# Patient Record
Sex: FEMALE | Race: WHITE | NOT HISPANIC OR LATINO | Employment: OTHER | URBAN - METROPOLITAN AREA
[De-identification: names, ages, dates, MRNs, and addresses within clinical notes are randomized per-mention and may not be internally consistent; named-entity substitution may affect disease eponyms.]

---

## 2017-01-17 ENCOUNTER — HOSPITAL ENCOUNTER (OUTPATIENT)
Dept: LAB | Facility: MEDICAL CENTER | Age: 82
End: 2017-01-17
Attending: INTERNAL MEDICINE
Payer: MEDICARE

## 2017-01-17 DIAGNOSIS — E78.5 DYSLIPIDEMIA: ICD-10-CM

## 2017-01-17 DIAGNOSIS — E03.9 ACQUIRED HYPOTHYROIDISM: ICD-10-CM

## 2017-01-17 LAB
25(OH)D3 SERPL-MCNC: 43 NG/ML (ref 30–100)
ALBUMIN SERPL BCP-MCNC: 4 G/DL (ref 3.2–4.9)
ALBUMIN/GLOB SERPL: 1.4 G/DL
ALP SERPL-CCNC: 45 U/L (ref 30–99)
ALT SERPL-CCNC: 14 U/L (ref 2–50)
ANION GAP SERPL CALC-SCNC: 5 MMOL/L (ref 0–11.9)
AST SERPL-CCNC: 16 U/L (ref 12–45)
BILIRUB SERPL-MCNC: 0.6 MG/DL (ref 0.1–1.5)
BUN SERPL-MCNC: 22 MG/DL (ref 8–22)
CALCIUM SERPL-MCNC: 9.2 MG/DL (ref 8.5–10.5)
CHLORIDE SERPL-SCNC: 105 MMOL/L (ref 96–112)
CHOLEST SERPL-MCNC: 221 MG/DL (ref 100–199)
CO2 SERPL-SCNC: 26 MMOL/L (ref 20–33)
CREAT SERPL-MCNC: 1.07 MG/DL (ref 0.5–1.4)
GLOBULIN SER CALC-MCNC: 2.9 G/DL (ref 1.9–3.5)
GLUCOSE SERPL-MCNC: 71 MG/DL (ref 65–99)
HDLC SERPL-MCNC: 49 MG/DL
LDLC SERPL CALC-MCNC: 137 MG/DL
POTASSIUM SERPL-SCNC: 3.3 MMOL/L (ref 3.6–5.5)
PROT SERPL-MCNC: 6.9 G/DL (ref 6–8.2)
SODIUM SERPL-SCNC: 136 MMOL/L (ref 135–145)
TRIGL SERPL-MCNC: 175 MG/DL (ref 0–149)
TSH SERPL DL<=0.005 MIU/L-ACNC: 0.89 UIU/ML (ref 0.3–3.7)

## 2017-01-17 PROCEDURE — 80061 LIPID PANEL: CPT

## 2017-01-17 PROCEDURE — 84443 ASSAY THYROID STIM HORMONE: CPT

## 2017-01-17 PROCEDURE — 80053 COMPREHEN METABOLIC PANEL: CPT

## 2017-01-17 PROCEDURE — 82306 VITAMIN D 25 HYDROXY: CPT

## 2017-01-17 PROCEDURE — 36415 COLL VENOUS BLD VENIPUNCTURE: CPT

## 2017-01-19 ENCOUNTER — OFFICE VISIT (OUTPATIENT)
Dept: MEDICAL GROUP | Facility: MEDICAL CENTER | Age: 82
End: 2017-01-19
Payer: MEDICARE

## 2017-01-19 VITALS
WEIGHT: 156 LBS | RESPIRATION RATE: 16 BRPM | TEMPERATURE: 97.5 F | HEIGHT: 64 IN | HEART RATE: 77 BPM | DIASTOLIC BLOOD PRESSURE: 66 MMHG | OXYGEN SATURATION: 93 % | SYSTOLIC BLOOD PRESSURE: 142 MMHG | BODY MASS INDEX: 26.63 KG/M2

## 2017-01-19 DIAGNOSIS — E78.5 DYSLIPIDEMIA: ICD-10-CM

## 2017-01-19 DIAGNOSIS — E03.9 ACQUIRED HYPOTHYROIDISM: ICD-10-CM

## 2017-01-19 DIAGNOSIS — E87.6 HYPOKALEMIA: ICD-10-CM

## 2017-01-19 DIAGNOSIS — I10 ESSENTIAL HYPERTENSION: ICD-10-CM

## 2017-01-19 PROCEDURE — 99214 OFFICE O/P EST MOD 30 MIN: CPT | Performed by: INTERNAL MEDICINE

## 2017-01-19 ASSESSMENT — PATIENT HEALTH QUESTIONNAIRE - PHQ9: CLINICAL INTERPRETATION OF PHQ2 SCORE: 0

## 2017-01-19 NOTE — MR AVS SNAPSHOT
"        Felipa HYATT Rach   2017 3:40 PM   Office Visit   MRN: [id number]    Department:  [address] [place] [address]   Dept Phone:  [phone]    Description:  Female : 1932   Provider:  Kirill Friedman M.D.           Reason for Visit     Follow-Up Doing ok      Allergies as of 2017     Allergen Noted Reactions    Lisinopril 2015   Diarrhea, Nausea, Cough    GI problems, lost control    Losartan 2015   Nausea, Cough    Pcn [Penicillins] 2014         You were diagnosed with     Essential hypertension   [3275268]       Acquired hypothyroidism   [2426844]       Dyslipidemia   [391691]       Hypokalemia   [135690]         Vital Signs     Blood Pressure Pulse Temperature Respirations Height Weight    142/66 mmHg 77 36.4 °C (97.5 °F) 16 1.626 m (5' 4\") 70.761 kg (156 lb)    Body Mass Index Oxygen Saturation Smoking Status             26.76 kg/m2 93% Former Smoker         Basic Information     Date Of Birth Sex Race Ethnicity Preferred Language    1932 Female White Non- English      Your appointments     2017  3:00 PM   Established Patient with Kirill Friedman M.D.   [facility] [address]    [address] [facility] [address]  [facility] [address] [phone]           You will be receiving a confirmation call a few days before your appointment from our automated call confirmation system.              Problem List              ICD-10-CM Priority Class Noted - Resolved    Acquired hypothyroidism E03.9 Medium  2014 - Present    Dyslipidemia E78.5 Medium  2015 - Present    Essential hypertension I10 High  2015 - Present    Vitamin D deficiency E55.9   3/11/2016 - Present      Health Maintenance        Date Due Completion Dates    IMM DTaP/Tdap/Td Vaccine (1 - Tdap) 1951 ---    IMM ZOSTER VACCINE 1992 ---    IMM INFLUENZA (1) 2016 ---    BONE DENSITY 2021            Current Immunizations     13-VALENT PCV PREVNAR 2016   "    Influenza Vaccine Adult HD 9/19/2016    Pneumococcal polysaccharide vaccine (PPSV-23) 12/20/2014  8:58 AM      Below and/or attached are the medications your provider expects you to take. Review all of your home medications and newly ordered medications with your provider and/or pharmacist. Follow medication instructions as directed by your provider and/or pharmacist. Please keep your medication list with you and share with your provider. Update the information when medications are discontinued, doses are changed, or new medications (including over-the-counter products) are added; and carry medication information at all times in the event of emergency situations     Allergies:  LISINOPRIL - Diarrhea,Nausea,Cough     LOSARTAN - Nausea,Cough     PCN - (reactions not documented)               Medications  Valid as of: January 19, 2017 -  4:32 PM    Generic Name Brand Name Tablet Size Instructions for use    AmLODIPine Besylate (Tab) NORVASC 5 MG Take 1 Tab by mouth 2 Times a Day.        Cholecalciferol   Take 1 Cap by mouth every day.        HydrALAZINE HCl (Tab) APRESOLINE 25 MG Take 1 Tab by mouth 2 Times a Day.        Levothyroxine Sodium (Tab) SYNTHROID 100 MCG Take 1 Tab by mouth every morning before breakfast.        .                 Medicines prescribed today were sent to:     [facility]/PHARMACY #[id number] - [place], [place] - [address] [address]    Phone: [phone] Fax: [phone]    Open 24 Hours?: No      Medication refill instructions:       If your prescription bottle indicates you have medication refills left, it is not necessary to call your provider’s office. Please contact your pharmacy and they will refill your medication.    If your prescription bottle indicates you do not have any refills left, you may request refills at any time through one of the following ways: The online [employer] system (except Urgent Care), by calling your provider’s  office, or by asking your pharmacy to contact your provider’s office with a refill request. Medication refills are processed only during regular business hours and may not be available until the next business day. Your provider may request additional information or to have a follow-up visit with you prior to refilling your medication.   *Please Note: Medication refills are assigned a new Rx number when refilled electronically. Your pharmacy may indicate that no refills were authorized even though a new prescription for the same medication is available at the pharmacy. Please request the medicine by name with the pharmacy before contacting your provider for a refill.        Other Notes About Your Plan     No evidence  Of CHF , reviewed            MyChart Access Code: Activation code not generated  Current [employer] Status: Active

## 2017-01-20 NOTE — PROGRESS NOTES
"CC: Follow-up multiple issues.    HPI:   Felipa presents today with the following.    1. Essential hypertension  Presents having been maintained on 3 drug therapy including spironolactone. She reports she did not like the way she felt on the spironolactone. She felt as if she was losing weight and somewhat weakened. She stopped the medication several weeks ago. Reports blood pressures not increased during that time.    2. Acquired hypothyroidism  Maintain on thyroid medication recent check showing acceptable values. Denies any hair skin changes.    3. Dyslipidemia  History of elevated cholesterol slightly elevated on lab work. Never been on a statin denies any significant risk and family for heart disease.    4. Hypokalemia  Potassium was found to be slightly low at 3.3 again she has stopped spironolactone over a month ago. Denies any cramping.      Patient Active Problem List    Diagnosis Date Noted   • Essential hypertension 04/18/2015     Priority: High   • Dyslipidemia 01/06/2015     Priority: Medium   • Acquired hypothyroidism 12/20/2014     Priority: Medium   • Vitamin D deficiency 03/11/2016       Current Outpatient Prescriptions   Medication Sig Dispense Refill   • amlodipine (NORVASC) 5 MG Tab Take 1 Tab by mouth 2 Times a Day. 180 Tab 4   • hydrALAZINE (APRESOLINE) 25 MG Tab Take 1 Tab by mouth 2 Times a Day. 180 Tab 4   • levothyroxine (SYNTHROID) 100 MCG Tab Take 1 Tab by mouth every morning before breakfast. 90 Tab 3   • Cholecalciferol (VITAMIN D-3 PO) Take 1 Cap by mouth every day.       No current facility-administered medications for this visit.         Allergies as of 01/19/2017 - Matthew as Reviewed 01/19/2017   Allergen Reaction Noted   • Lisinopril Diarrhea, Nausea, and Cough 04/08/2015   • Losartan Nausea and Cough 04/08/2015   • Pcn [penicillins]  12/19/2014        ROS: As per HPI.    /66 mmHg  Pulse 77  Temp(Src) 36.4 °C (97.5 °F)  Resp 16  Ht 1.626 m (5' 4\")  Wt 70.761 kg (156 lb) "  BMI 26.76 kg/m2  SpO2 93%    Physical Exam:  Gen:         Alert and oriented, No apparent distress.  Neck:        No Lymphadenopathy or Bruits.  Lungs:     Clear to auscultation bilaterally  CV:          Regular rate and rhythm. No murmurs, rubs or gallops.               Ext:          No clubbing, cyanosis, edema.      Assessment and Plan.   84 y.o. female with the following issues.    1. Essential hypertension  Currently well controlled, Discuss diet, exercise and salt restriction.    2. Acquired hypothyroidism  Hypothyroidism.   Currently adequately replaced, recheck 6 months to one year.      3. Dyslipidemia  Dyslipidemia.  Lipids currently well controlled. Discussed continued diet and exercise recheck 6 months to 1 year.    4. Hypokalemia  Have recommended increasing potassium in diet as well as low-dose over-the-counter replacement.

## 2017-05-26 ENCOUNTER — TELEPHONE (OUTPATIENT)
Dept: MEDICAL GROUP | Facility: MEDICAL CENTER | Age: 82
End: 2017-05-26

## 2017-05-26 NOTE — TELEPHONE ENCOUNTER
Future Appointments       Provider Department Center    6/6/2017 3:00 PM Kirill Friedman M.D. [facility] [address]        ESTABLISHED PATIENT PRE-VISIT PLANNING     Note: Patient will not be contacted if there is no indication to call.     1.  Reviewed note from last office visit with PCP and/or other med group provider: Yes    2.  If any orders were placed at last visit, do we have Results/Consult Notes?        •  Labs - Labs were not ordered at last office visit.       •  Imaging - Imaging was not ordered at last office visit.       •  Referrals - No referrals were ordered at last office visit.    3.  Immunizations were updated in [employer] using WebIZ?: Yes       •  Web Iz Recommendations: HEPATITIS A  TDAP ZOSTAVAX (Shingles)    4.  Patient is due for the following Health Maintenance Topics:   Health Maintenance Due   Topic Date Due   • IMM DTaP/Tdap/Td Vaccine (1 - Tdap) 09/08/1951   • IMM ZOSTER VACCINE  09/08/1992           5.  Patient was not informed to arrive 15 min prior to their scheduled appointment and bring in their medication bottles.

## 2017-06-05 DIAGNOSIS — E03.9 ACQUIRED HYPOTHYROIDISM: ICD-10-CM

## 2017-06-05 DIAGNOSIS — E87.6 HYPOKALEMIA: ICD-10-CM

## 2017-06-06 ENCOUNTER — APPOINTMENT (OUTPATIENT)
Dept: MEDICAL GROUP | Facility: MEDICAL CENTER | Age: 82
End: 2017-06-06
Payer: MEDICARE

## 2017-06-09 ENCOUNTER — TELEPHONE (OUTPATIENT)
Dept: MEDICAL GROUP | Facility: MEDICAL CENTER | Age: 82
End: 2017-06-09

## 2017-06-09 NOTE — TELEPHONE ENCOUNTER
Future Appointments       Provider Department Center    6/13/2017 9:15 AM [facility]     6/16/2017 2:40 PM Kirill Friedman M.D. [facility] [address]        ESTABLISHED PATIENT PRE-VISIT PLANNING     Note: Patient will not be contacted if there is no indication to call.     1.  Reviewed note from last office visit with PCP and/or other med group provider: Yes    2.  If any orders were placed at last visit, do we have Results/Consult Notes?        •  Labs - patient has appt to have labs done       •  Imaging - Imaging was not ordered at last office visit.       •  Referrals - No referrals were ordered at last office visit.    3.  Immunizations were updated in Epic using WebIZ?: Yes       •  Web Iz Recommendations: HEPATITIS A  TDAP ZOSTAVAX (Shingles)    4.  Patient is due for the following Health Maintenance Topics:   Health Maintenance Due   Topic Date Due   • IMM DTaP/Tdap/Td Vaccine (1 - Tdap) 09/08/1951   • IMM ZOSTER VACCINE  09/08/1992           5.  Patient was not informed to arrive 15 min prior to their scheduled appointment and bring in their medication bottles.

## 2017-06-13 ENCOUNTER — HOSPITAL ENCOUNTER (OUTPATIENT)
Dept: LAB | Facility: MEDICAL CENTER | Age: 82
End: 2017-06-13
Attending: INTERNAL MEDICINE
Payer: MEDICARE

## 2017-06-13 DIAGNOSIS — E03.9 ACQUIRED HYPOTHYROIDISM: ICD-10-CM

## 2017-06-13 DIAGNOSIS — E87.6 HYPOKALEMIA: ICD-10-CM

## 2017-06-13 LAB
ANION GAP SERPL CALC-SCNC: 7 MMOL/L (ref 0–11.9)
BUN SERPL-MCNC: 20 MG/DL (ref 8–22)
CALCIUM SERPL-MCNC: 9.5 MG/DL (ref 8.5–10.5)
CHLORIDE SERPL-SCNC: 106 MMOL/L (ref 96–112)
CO2 SERPL-SCNC: 25 MMOL/L (ref 20–33)
CREAT SERPL-MCNC: 1.12 MG/DL (ref 0.5–1.4)
GFR SERPL CREATININE-BSD FRML MDRD: 46 ML/MIN/1.73 M 2
GLUCOSE SERPL-MCNC: 84 MG/DL (ref 65–99)
POTASSIUM SERPL-SCNC: 4.1 MMOL/L (ref 3.6–5.5)
SODIUM SERPL-SCNC: 138 MMOL/L (ref 135–145)
TSH SERPL DL<=0.005 MIU/L-ACNC: 1.34 UIU/ML (ref 0.3–3.7)

## 2017-06-13 PROCEDURE — 80048 BASIC METABOLIC PNL TOTAL CA: CPT

## 2017-06-13 PROCEDURE — 36415 COLL VENOUS BLD VENIPUNCTURE: CPT

## 2017-06-13 PROCEDURE — 84443 ASSAY THYROID STIM HORMONE: CPT

## 2017-06-16 ENCOUNTER — APPOINTMENT (OUTPATIENT)
Dept: MEDICAL GROUP | Facility: MEDICAL CENTER | Age: 82
End: 2017-06-16
Payer: MEDICARE

## 2017-07-06 ENCOUNTER — OFFICE VISIT (OUTPATIENT)
Dept: MEDICAL GROUP | Facility: MEDICAL CENTER | Age: 82
End: 2017-07-06
Payer: MEDICARE

## 2017-07-06 VITALS
DIASTOLIC BLOOD PRESSURE: 82 MMHG | OXYGEN SATURATION: 96 % | SYSTOLIC BLOOD PRESSURE: 134 MMHG | BODY MASS INDEX: 26.63 KG/M2 | TEMPERATURE: 98.3 F | RESPIRATION RATE: 16 BRPM | HEIGHT: 64 IN | WEIGHT: 156 LBS | HEART RATE: 64 BPM

## 2017-07-06 DIAGNOSIS — I10 ESSENTIAL HYPERTENSION: ICD-10-CM

## 2017-07-06 DIAGNOSIS — N32.81 OAB (OVERACTIVE BLADDER): ICD-10-CM

## 2017-07-06 DIAGNOSIS — R42 VERTIGO: ICD-10-CM

## 2017-07-06 DIAGNOSIS — E03.9 ACQUIRED HYPOTHYROIDISM: ICD-10-CM

## 2017-07-06 PROCEDURE — 99214 OFFICE O/P EST MOD 30 MIN: CPT | Performed by: INTERNAL MEDICINE

## 2017-07-06 RX ORDER — OXYBUTYNIN CHLORIDE 5 MG/1
5 TABLET ORAL 2 TIMES DAILY
Qty: 60 TAB | Refills: 6 | Status: SHIPPED | OUTPATIENT
Start: 2017-07-06 | End: 2017-07-27

## 2017-07-06 NOTE — PROGRESS NOTES
CC: Follow-up multiple issues    HPI:   Felipa presents today with the following.    1. OAB (overactive bladder)  Presents complaining of persistent fatigue when questioned further she is not sleeping well at night because of waking up frequently to urinate. She does not have as much an issue during the day. She does not drink large amount of water before bedtime. She does report some accidents at night if she can't bathroom in time. She does seem to go more at night than during the day. No fevers or chills and no pain with urination.    2. Vertigo  She does have intermittent issues with vertigo as well not bad enough that she wants to physical therapy for.    3. Acquired hypothyroidism  Thyroid checked in a reasonable range maintain on medications denying any related symptoms other than possibly fatigue which is not explained by normal result.    4. Essential hypertension  Has recently stopped spironolactone blood pressure good control denying any chest pain or shortness of breath no edema.      Patient Active Problem List    Diagnosis Date Noted   • Essential hypertension 04/18/2015     Priority: High   • Dyslipidemia 01/06/2015     Priority: Medium   • Acquired hypothyroidism 12/20/2014     Priority: Medium   • Vitamin D deficiency 03/11/2016       Current Outpatient Prescriptions   Medication Sig Dispense Refill   • oxybutynin (DITROPAN) 5 MG Tab Take 1 Tab by mouth 2 Times a Day. 60 Tab 6   • amlodipine (NORVASC) 5 MG Tab Take 1 Tab by mouth 2 Times a Day. 180 Tab 4   • hydrALAZINE (APRESOLINE) 25 MG Tab Take 1 Tab by mouth 2 Times a Day. 180 Tab 4   • levothyroxine (SYNTHROID) 100 MCG Tab Take 1 Tab by mouth every morning before breakfast. 90 Tab 3   • Cholecalciferol (VITAMIN D-3 PO) Take 1 Cap by mouth every day.       No current facility-administered medications for this visit.         Allergies as of 07/06/2017 - Matthew as Reviewed 07/06/2017   Allergen Reaction Noted   • Lisinopril Diarrhea, Nausea, and  "Cough 04/08/2015   • Losartan Nausea and Cough 04/08/2015   • Pcn [penicillins]  12/19/2014        ROS: As per HPI.    /82 mmHg  Pulse 64  Temp(Src) 36.8 °C (98.3 °F)  Resp 16  Ht 1.626 m (5' 4.02\")  Wt 70.761 kg (156 lb)  BMI 26.76 kg/m2  SpO2 96%    Physical Exam:  Gen:         Alert and oriented, No apparent distress.  Neck:        No Lymphadenopathy or Bruits.  Lungs:     Clear to auscultation bilaterally  CV:          Regular rate and rhythm. No murmurs, rubs or gallops.               Ext:          No clubbing, cyanosis, edema.      Assessment and Plan.   84 y.o. female with the following issues.    1. OAB (overactive bladder)  Certainly could be consistent with overactive bladder at night have started on dispensed cautioned about side effects. Will see back in one month to see if it's improved.  - oxybutynin (DITROPAN) 5 MG Tab; Take 1 Tab by mouth 2 Times a Day.  Dispense: 60 Tab; Refill: 6    2. Vertigo  Continue to monitor for worsening symptoms will refer to PT.    3. Acquired hypothyroidism  Currently adequately replaced, recheck 6 months to one year.    4. Essential hypertension  Currently well controlled, Discuss diet, exercise and salt restriction.        "

## 2017-07-06 NOTE — MR AVS SNAPSHOT
"        Felipa HYATT Rach   2017 3:20 PM   Office Visit   MRN: [id number]    Department:  [address] [place] [address]   Dept Phone:  [phone]    Description:  Female : 1932   Provider:  Kirill Friedman M.D.           Reason for Visit     Hypothyroidism     Hypertension     Results review lab results    Fatigue           Allergies as of 2017     Allergen Noted Reactions    Lisinopril 2015   Diarrhea, Nausea, Cough    GI problems, lost control    Losartan 2015   Nausea, Cough    Pcn [Penicillins] 2014         You were diagnosed with     OAB (overactive bladder)   [074152]       Vertigo   [415781]       Acquired hypothyroidism   [3418878]       Essential hypertension   [4201731]         Vital Signs     Blood Pressure Pulse Temperature Respirations Height Weight    134/82 mmHg 64 36.8 °C (98.3 °F) 16 1.626 m (5' 4.02\") 70.761 kg (156 lb)    Body Mass Index Oxygen Saturation Smoking Status             26.76 kg/m2 96% Former Smoker         Basic Information     Date Of Birth Sex Race Ethnicity Preferred Language    1932 Female White Non- English      Your appointments     Sep 14, 2017  2:40 PM   ANNUAL WELLNESS with Kirill Friedman M.D., [facility]    [facility] [address]    [address] [place] [id number]   [phone]              Problem List              ICD-10-CM Priority Class Noted - Resolved    Acquired hypothyroidism E03.9 Medium  2014 - Present    Dyslipidemia E78.5 Medium  2015 - Present    Essential hypertension I10 High  2015 - Present    Vitamin D deficiency E55.9   3/11/2016 - Present      Health Maintenance        Date Due Completion Dates    IMM DTaP/Tdap/Td Vaccine (1 - Tdap) 1951 ---    IMM ZOSTER VACCINE 1992 ---    IMM INFLUENZA (1) 2017    BONE DENSITY 2021            Current Immunizations     13-VALENT PCV PREVNAR 2016    Influenza Vaccine Adult HD 2016  "    Pneumococcal polysaccharide vaccine (PPSV-23) 12/20/2014  8:58 AM      Below and/or attached are the medications your provider expects you to take. Review all of your home medications and newly ordered medications with your provider and/or pharmacist. Follow medication instructions as directed by your provider and/or pharmacist. Please keep your medication list with you and share with your provider. Update the information when medications are discontinued, doses are changed, or new medications (including over-the-counter products) are added; and carry medication information at all times in the event of emergency situations     Allergies:  LISINOPRIL - Diarrhea,Nausea,Cough     LOSARTAN - Nausea,Cough     PCN - (reactions not documented)               Medications  Valid as of: July 06, 2017 -  4:01 PM    Generic Name Brand Name Tablet Size Instructions for use    AmLODIPine Besylate (Tab) NORVASC 5 MG Take 1 Tab by mouth 2 Times a Day.        Cholecalciferol   Take 1 Cap by mouth every day.        HydrALAZINE HCl (Tab) APRESOLINE 25 MG Take 1 Tab by mouth 2 Times a Day.        Levothyroxine Sodium (Tab) SYNTHROID 100 MCG Take 1 Tab by mouth every morning before breakfast.        Oxybutynin Chloride (Tab) DITROPAN 5 MG Take 1 Tab by mouth 2 Times a Day.        .                 Medicines prescribed today were sent to:     [facility]/PHARMACY #[id number] - [place], [place] - [address] [place] [address]    Phone: [phone] Fax: [phone]    Open 24 Hours?: No      Medication refill instructions:       If your prescription bottle indicates you have medication refills left, it is not necessary to call your provider’s office. Please contact your pharmacy and they will refill your medication.    If your prescription bottle indicates you do not have any refills left, you may request refills at any time through one of the following ways: The online [employer] system (except Urgent  Care), by calling your provider’s office, or by asking your pharmacy to contact your provider’s office with a refill request. Medication refills are processed only during regular business hours and may not be available until the next business day. Your provider may request additional information or to have a follow-up visit with you prior to refilling your medication.   *Please Note: Medication refills are assigned a new Rx number when refilled electronically. Your pharmacy may indicate that no refills were authorized even though a new prescription for the same medication is available at the pharmacy. Please request the medicine by name with the pharmacy before contacting your provider for a refill.        Other Notes About Your Plan     No evidence  Of CHF , reviewed            MyChart Access Code: Activation code not generated  Current [employer] Status: Active

## 2017-07-27 ENCOUNTER — RESOLUTE PROFESSIONAL BILLING HOSPITAL PROF FEE (OUTPATIENT)
Dept: HOSPITALIST | Facility: MEDICAL CENTER | Age: 82
End: 2017-07-27
Payer: MEDICARE

## 2017-07-27 ENCOUNTER — HOSPITAL ENCOUNTER (OUTPATIENT)
Facility: MEDICAL CENTER | Age: 82
End: 2017-07-28
Attending: EMERGENCY MEDICINE | Admitting: HOSPITALIST
Payer: MEDICARE

## 2017-07-27 DIAGNOSIS — R11.2 INTRACTABLE VOMITING WITH NAUSEA, UNSPECIFIED VOMITING TYPE: ICD-10-CM

## 2017-07-27 DIAGNOSIS — M79.605 PAIN IN LEFT LEG: ICD-10-CM

## 2017-07-27 PROBLEM — M79.606 LEG PAIN, ANTERIOR: Status: ACTIVE | Noted: 2017-07-27

## 2017-07-27 LAB
ALBUMIN SERPL BCP-MCNC: 4.1 G/DL (ref 3.2–4.9)
ALBUMIN/GLOB SERPL: 1.4 G/DL
ALP SERPL-CCNC: 48 U/L (ref 30–99)
ALT SERPL-CCNC: 12 U/L (ref 2–50)
ANION GAP SERPL CALC-SCNC: 10 MMOL/L (ref 0–11.9)
APPEARANCE UR: CLEAR
AST SERPL-CCNC: 16 U/L (ref 12–45)
BACTERIA #/AREA URNS HPF: ABNORMAL /HPF
BASOPHILS # BLD AUTO: 0.1 % (ref 0–1.8)
BASOPHILS # BLD: 0.01 K/UL (ref 0–0.12)
BILIRUB SERPL-MCNC: 0.6 MG/DL (ref 0.1–1.5)
BILIRUB UR QL STRIP.AUTO: NEGATIVE
BUN SERPL-MCNC: 23 MG/DL (ref 8–22)
CALCIUM SERPL-MCNC: 9.4 MG/DL (ref 8.5–10.5)
CHLORIDE SERPL-SCNC: 106 MMOL/L (ref 96–112)
CO2 SERPL-SCNC: 19 MMOL/L (ref 20–33)
COLOR UR: YELLOW
CREAT SERPL-MCNC: 1.08 MG/DL (ref 0.5–1.4)
CULTURE IF INDICATED INDCX: YES UA CULTURE
EOSINOPHIL # BLD AUTO: 0 K/UL (ref 0–0.51)
EOSINOPHIL NFR BLD: 0 % (ref 0–6.9)
EPI CELLS #/AREA URNS HPF: ABNORMAL /HPF
ERYTHROCYTE [DISTWIDTH] IN BLOOD BY AUTOMATED COUNT: 41.3 FL (ref 35.9–50)
GFR SERPL CREATININE-BSD FRML MDRD: 48 ML/MIN/1.73 M 2
GLOBULIN SER CALC-MCNC: 3 G/DL (ref 1.9–3.5)
GLUCOSE SERPL-MCNC: 196 MG/DL (ref 65–99)
GLUCOSE UR STRIP.AUTO-MCNC: 100 MG/DL
HCT VFR BLD AUTO: 38 % (ref 37–47)
HGB BLD-MCNC: 12.8 G/DL (ref 12–16)
IMM GRANULOCYTES # BLD AUTO: 0.09 K/UL (ref 0–0.11)
IMM GRANULOCYTES NFR BLD AUTO: 0.8 % (ref 0–0.9)
KETONES UR STRIP.AUTO-MCNC: 15 MG/DL
LEUKOCYTE ESTERASE UR QL STRIP.AUTO: ABNORMAL
LIPASE SERPL-CCNC: 7 U/L (ref 11–82)
LYMPHOCYTES # BLD AUTO: 0.47 K/UL (ref 1–4.8)
LYMPHOCYTES NFR BLD: 4.2 % (ref 22–41)
MCH RBC QN AUTO: 30.4 PG (ref 27–33)
MCHC RBC AUTO-ENTMCNC: 33.7 G/DL (ref 33.6–35)
MCV RBC AUTO: 90.3 FL (ref 81.4–97.8)
MICRO URNS: ABNORMAL
MONOCYTES # BLD AUTO: 0.08 K/UL (ref 0–0.85)
MONOCYTES NFR BLD AUTO: 0.7 % (ref 0–13.4)
MUCOUS THREADS #/AREA URNS HPF: ABNORMAL /HPF
NEUTROPHILS # BLD AUTO: 10.43 K/UL (ref 2–7.15)
NEUTROPHILS NFR BLD: 94.2 % (ref 44–72)
NITRITE UR QL STRIP.AUTO: NEGATIVE
NRBC # BLD AUTO: 0 K/UL
NRBC BLD AUTO-RTO: 0 /100 WBC
PH UR STRIP.AUTO: 6 [PH]
PLATELET # BLD AUTO: 201 K/UL (ref 164–446)
PMV BLD AUTO: 8.9 FL (ref 9–12.9)
POTASSIUM SERPL-SCNC: 3.6 MMOL/L (ref 3.6–5.5)
PROT SERPL-MCNC: 7.1 G/DL (ref 6–8.2)
PROT UR QL STRIP: 100 MG/DL
RBC # BLD AUTO: 4.21 M/UL (ref 4.2–5.4)
RBC # URNS HPF: ABNORMAL /HPF
RBC UR QL AUTO: NEGATIVE
SODIUM SERPL-SCNC: 135 MMOL/L (ref 135–145)
SP GR UR STRIP.AUTO: 1.02
TROPONIN I SERPL-MCNC: <0.01 NG/ML (ref 0–0.04)
UROBILINOGEN UR STRIP.AUTO-MCNC: 0.2 MG/DL
WBC # BLD AUTO: 11.1 K/UL (ref 4.8–10.8)
WBC #/AREA URNS HPF: ABNORMAL /HPF

## 2017-07-27 PROCEDURE — 96361 HYDRATE IV INFUSION ADD-ON: CPT

## 2017-07-27 PROCEDURE — 80053 COMPREHEN METABOLIC PANEL: CPT

## 2017-07-27 PROCEDURE — 700105 HCHG RX REV CODE 258: Performed by: EMERGENCY MEDICINE

## 2017-07-27 PROCEDURE — 96374 THER/PROPH/DIAG INJ IV PUSH: CPT

## 2017-07-27 PROCEDURE — 83690 ASSAY OF LIPASE: CPT

## 2017-07-27 PROCEDURE — 81001 URINALYSIS AUTO W/SCOPE: CPT

## 2017-07-27 PROCEDURE — G0378 HOSPITAL OBSERVATION PER HR: HCPCS

## 2017-07-27 PROCEDURE — 87086 URINE CULTURE/COLONY COUNT: CPT

## 2017-07-27 PROCEDURE — 700111 HCHG RX REV CODE 636 W/ 250 OVERRIDE (IP): Performed by: EMERGENCY MEDICINE

## 2017-07-27 PROCEDURE — 700102 HCHG RX REV CODE 250 W/ 637 OVERRIDE(OP): Performed by: HOSPITALIST

## 2017-07-27 PROCEDURE — 84484 ASSAY OF TROPONIN QUANT: CPT

## 2017-07-27 PROCEDURE — A9270 NON-COVERED ITEM OR SERVICE: HCPCS | Performed by: HOSPITALIST

## 2017-07-27 PROCEDURE — 85025 COMPLETE CBC W/AUTO DIFF WBC: CPT

## 2017-07-27 PROCEDURE — 96375 TX/PRO/DX INJ NEW DRUG ADDON: CPT

## 2017-07-27 PROCEDURE — 87077 CULTURE AEROBIC IDENTIFY: CPT

## 2017-07-27 PROCEDURE — 99285 EMERGENCY DEPT VISIT HI MDM: CPT

## 2017-07-27 PROCEDURE — 99219 PR INITIAL OBSERVATION CARE,LEVL II: CPT | Performed by: HOSPITALIST

## 2017-07-27 RX ORDER — MORPHINE SULFATE 4 MG/ML
2 INJECTION, SOLUTION INTRAMUSCULAR; INTRAVENOUS
Status: DISCONTINUED | OUTPATIENT
Start: 2017-07-27 | End: 2017-07-28 | Stop reason: HOSPADM

## 2017-07-27 RX ORDER — METOCLOPRAMIDE HYDROCHLORIDE 5 MG/ML
5 INJECTION INTRAMUSCULAR; INTRAVENOUS ONCE
Status: COMPLETED | OUTPATIENT
Start: 2017-07-27 | End: 2017-07-27

## 2017-07-27 RX ORDER — ONDANSETRON 2 MG/ML
4 INJECTION INTRAMUSCULAR; INTRAVENOUS EVERY 4 HOURS PRN
Status: DISCONTINUED | OUTPATIENT
Start: 2017-07-27 | End: 2017-07-28 | Stop reason: HOSPADM

## 2017-07-27 RX ORDER — LEVOTHYROXINE SODIUM 0.1 MG/1
100 TABLET ORAL
Status: DISCONTINUED | OUTPATIENT
Start: 2017-07-28 | End: 2017-07-28 | Stop reason: HOSPADM

## 2017-07-27 RX ORDER — HYDRALAZINE HYDROCHLORIDE 25 MG/1
25 TABLET, FILM COATED ORAL 2 TIMES DAILY
Status: DISCONTINUED | OUTPATIENT
Start: 2017-07-27 | End: 2017-07-28 | Stop reason: HOSPADM

## 2017-07-27 RX ORDER — MORPHINE SULFATE 4 MG/ML
2 INJECTION, SOLUTION INTRAMUSCULAR; INTRAVENOUS ONCE
Status: COMPLETED | OUTPATIENT
Start: 2017-07-27 | End: 2017-07-27

## 2017-07-27 RX ORDER — BISACODYL 10 MG
10 SUPPOSITORY, RECTAL RECTAL
Status: DISCONTINUED | OUTPATIENT
Start: 2017-07-27 | End: 2017-07-28 | Stop reason: HOSPADM

## 2017-07-27 RX ORDER — ONDANSETRON 4 MG/1
4 TABLET, ORALLY DISINTEGRATING ORAL EVERY 4 HOURS PRN
Status: DISCONTINUED | OUTPATIENT
Start: 2017-07-27 | End: 2017-07-28 | Stop reason: HOSPADM

## 2017-07-27 RX ORDER — SODIUM CHLORIDE 9 MG/ML
500 INJECTION, SOLUTION INTRAVENOUS ONCE
Status: COMPLETED | OUTPATIENT
Start: 2017-07-27 | End: 2017-07-27

## 2017-07-27 RX ORDER — AMLODIPINE BESYLATE 5 MG/1
5 TABLET ORAL 2 TIMES DAILY
Status: DISCONTINUED | OUTPATIENT
Start: 2017-07-27 | End: 2017-07-28 | Stop reason: HOSPADM

## 2017-07-27 RX ORDER — ZOLPIDEM TARTRATE 5 MG/1
2.5 TABLET ORAL NIGHTLY PRN
Status: DISCONTINUED | OUTPATIENT
Start: 2017-07-27 | End: 2017-07-28 | Stop reason: HOSPADM

## 2017-07-27 RX ORDER — AMOXICILLIN 250 MG
2 CAPSULE ORAL 2 TIMES DAILY
Status: DISCONTINUED | OUTPATIENT
Start: 2017-07-27 | End: 2017-07-28 | Stop reason: HOSPADM

## 2017-07-27 RX ORDER — ONDANSETRON 2 MG/ML
4 INJECTION INTRAMUSCULAR; INTRAVENOUS ONCE
Status: COMPLETED | OUTPATIENT
Start: 2017-07-27 | End: 2017-07-27

## 2017-07-27 RX ORDER — ACETAMINOPHEN 325 MG/1
650 TABLET ORAL EVERY 6 HOURS PRN
Status: DISCONTINUED | OUTPATIENT
Start: 2017-07-27 | End: 2017-07-28 | Stop reason: HOSPADM

## 2017-07-27 RX ORDER — POLYETHYLENE GLYCOL 3350 17 G/17G
1 POWDER, FOR SOLUTION ORAL
Status: DISCONTINUED | OUTPATIENT
Start: 2017-07-27 | End: 2017-07-28 | Stop reason: HOSPADM

## 2017-07-27 RX ORDER — TRAMADOL HYDROCHLORIDE 50 MG/1
50 TABLET ORAL EVERY 6 HOURS PRN
Status: DISCONTINUED | OUTPATIENT
Start: 2017-07-27 | End: 2017-07-28 | Stop reason: HOSPADM

## 2017-07-27 RX ADMIN — METOCLOPRAMIDE 5 MG: 5 INJECTION, SOLUTION INTRAMUSCULAR; INTRAVENOUS at 21:05

## 2017-07-27 RX ADMIN — MORPHINE SULFATE 2 MG: 4 INJECTION INTRAVENOUS at 19:32

## 2017-07-27 RX ADMIN — AMLODIPINE BESYLATE 5 MG: 5 TABLET ORAL at 23:50

## 2017-07-27 RX ADMIN — ONDANSETRON 4 MG: 2 INJECTION INTRAMUSCULAR; INTRAVENOUS at 18:51

## 2017-07-27 RX ADMIN — SODIUM CHLORIDE 500 ML: 9 INJECTION, SOLUTION INTRAVENOUS at 21:05

## 2017-07-27 ASSESSMENT — PAIN SCALES - GENERAL: PAINLEVEL_OUTOF10: 9

## 2017-07-27 NOTE — IP AVS SNAPSHOT
" Home Care Instructions                                                                                                                  Name:Felipa Loyd  Medical Record Number:[id number]  CSN: [id number]    YOB: 1932   Age: 84 y.o.  Sex: female  HT:1.626 m (5' 4.02\") WT: 69.6 kg (153 lb 7 oz)          Admit Date: 7/27/2017     Discharge Date:   Today's Date: 7/28/2017  Attending Doctor:  Jerrod Dye M.D.                  Allergies:  Lisinopril; Losartan; and Pcn            Discharge Instructions       D/c iv  [facility] clinic for f/u  PCP within a week  ED if emergency      Discharge Instructions    Discharged to home by car with relative. Discharged via wheelchair, hospital escort: Yes.  Special equipment needed: Not Applicable    Be sure to schedule a follow-up appointment with your primary care doctor or any specialists as instructed.     Discharge Plan:   Influenza Vaccine Indication: Patient Refuses    I understand that a diet low in cholesterol, fat, and sodium is recommended for good health. Unless I have been given specific instructions below for another diet, I accept this instruction as my diet prescription.   Other diet: Heart healthy     Special Instructions: None    · Is patient discharged on Warfarin / Coumadin?   No     · Is patient Post Blood Transfusion?  No    Depression / Suicide Risk    As you are discharged from this [facility] facility, it is important to learn how to keep safe from harming yourself.    Recognize the warning signs:  · Abrupt changes in personality, positive or negative- including increase in energy   · Giving away possessions  · Change in eating patterns- significant weight changes-  positive or negative  · Change in sleeping patterns- unable to sleep or sleeping all the time   · Unwillingness or inability to communicate  · Depression  · Unusual sadness, discouragement and loneliness  · Talk of wanting to die  · Neglect of personal " appearance   · Rebelliousness- reckless behavior  · Withdrawal from people/activities they love  · Confusion- inability to concentrate     If you or a loved one observes any of these behaviors or has concerns about self-harm, here's what you can do:  · Talk about it- your feelings and reasons for harming yourself  · Remove any means that you might use to hurt yourself (examples: pills, rope, extension cords, firearm)  · Get professional help from the community (Mental Health, Substance Abuse, psychological counseling)  · Do not be alone:Call your Safe Contact- someone whom you trust who will be there for you.  · Call your local CRISIS HOTLINE 724-7973 or 305-212-7357  · Call your local Children's Mobile Crisis Response Team [place] (730) 593-9170 or www.Lysanda  · Call the toll free National Suicide Prevention Hotlines   · National Suicide Prevention Lifeline 093-212-PPZT (5533)  · [employer] Line Network 800-SUICIDE (333-7913)        Your appointments     Aug 04, 2017  1:00 PM   Established Patient with Kirill Friedman M.D.   [facility] [address]    [address] [facility] [address]  [place] [id number] [phone]           You will be receiving a confirmation call a few days before your appointment from our automated call confirmation system.            Sep 14, 2017  2:40 PM   ANNUAL WELLNESS with Kirill Friedman M.D., [facility]    [facility] [address]    [address] [facility] [address]  [place] [id number] [phone]                 Discharge Medication Instructions:    Below are the medications your physician expects you to take upon discharge:    Review all your home medications and newly ordered medications with your doctor and/or pharmacist. Follow medication instructions as directed by your doctor and/or pharmacist.    Please keep your medication list with you and share with your physician.               Medication List      ASK your doctor  about these medications        Instructions    Morning Afternoon Evening Bedtime    amlodipine 5 MG Tabs   Last time this was given:  5 mg on 7/28/2017  9:12 AM   Commonly known as:  NORVASC        Take 1 Tab by mouth 2 Times a Day.   Dose:  5 mg                        hydrALAZINE 25 MG Tabs   Last time this was given:  25 mg on 7/28/2017  9:12 AM   Commonly known as:  APRESOLINE        Take 1 Tab by mouth 2 Times a Day.   Dose:  25 mg                        levothyroxine 100 MCG Tabs   Last time this was given:  100 mcg on 7/28/2017  7:00 AM   Commonly known as:  SYNTHROID        Take 1 Tab by mouth every morning before breakfast.   Dose:  100 mcg                        VITAMIN D-3 PO        Take 1 Cap by mouth every evening.   Dose:  1 Cap                                Instructions           Diet / Nutrition:    Follow any diet instructions given to you by your doctor or the dietician, including how much salt (sodium) you are allowed each day.    If you are overweight, talk to your doctor about a weight reduction plan.    Activity:    Remain physically active following your doctor's instructions about exercise and activity.    Rest often.     Any time you become even a little tired or short of breath, SIT DOWN and rest.    Worsening Symptoms:    Report any of the following signs and symptoms to the doctor's office immediately:    *Pain of jaw, arm, or neck  *Chest pain not relieved by medication                               *Dizziness or loss of consciousness  *Difficulty breathing even when at rest   *More tired than usual                                       *Bleeding drainage or swelling of surgical site  *Swelling of feet, ankles, legs or stomach                 *Fever (>100ºF)  *Pink or blood tinged sputum  *Weight gain (3lbs/day or 5lbs /week)           *Shock from internal defibrillator (if applicable)  *Palpitations or irregular heartbeats                *Cool and/or numb extremities    Stroke  Awareness    Common Risk Factors for Stroke include:    Age  Atrial Fibrillation  Carotid Artery Stenosis  Diabetes Mellitus  Excessive alcohol consumption  High blood pressure  Overweight   Physical inactivity  Smoking    Warning signs and symptoms of a stroke include:    *Sudden numbness or weakness of the face, arm or leg (especially on one side of the body).  *Sudden confusion, trouble speaking or understanding.  *Sudden trouble seeing in one or both eyes.  *Sudden trouble walking, dizziness, loss of balance or coordination.Sudden severe headache with no known cause.    It is very important to get treatment quickly when a stroke occurs. If you experience any of the above warning signs, call 911 immediately.                   Disclaimer         Quit Smoking / Tobacco Use:    I understand the use of any tobacco products increases my chance of suffering from future heart disease or stroke and could cause other illnesses which may shorten my life. Quitting the use of tobacco products is the single most important thing I can do to improve my health. For further information on smoking / tobacco cessation call a Toll Free Quit Line at 1-598.370.2326 (*National [employer] [place]) or 1-929.879.2679 (American Lung Association) or you can access the web based program at www.lungusa.org.    [place] Tobacco Users Help Line:  (572) 832-7092       Toll Free: 1-704.446.4135  Quit Tobacco Program [facility] (298)907-5429    Crisis Hotline:    [place] Crisis Hotline:  3-090-XTKKHVQ or 1-161.655.8479    [place] Crisis Hotline:    1-352.965.8604 or 001-650-8336    Discharge Survey:   Thank you for choosing [facility]. We hope we did everything we could to make your hospital stay a pleasant one. You may be receiving a phone survey and we would appreciate your time and participation in answering the questions. Your input is very valuable to us in our efforts to improve our service to our patients and their  families.        My signature on this form indicates that:    1. I have reviewed and understand the above information.  2. My questions regarding this information have been answered to my satisfaction.  3. I have formulated a plan with my discharge nurse to obtain my prescribed medications for home.                  Disclaimer         __________________________________                     __________       ________                       Patient Signature                                                 Date                    Time

## 2017-07-28 ENCOUNTER — PATIENT OUTREACH (OUTPATIENT)
Dept: HEALTH INFORMATION MANAGEMENT | Facility: OTHER | Age: 82
End: 2017-07-28

## 2017-07-28 VITALS
WEIGHT: 153.44 LBS | HEIGHT: 64 IN | TEMPERATURE: 98.9 F | HEART RATE: 80 BPM | RESPIRATION RATE: 18 BRPM | BODY MASS INDEX: 26.2 KG/M2 | SYSTOLIC BLOOD PRESSURE: 143 MMHG | DIASTOLIC BLOOD PRESSURE: 64 MMHG | OXYGEN SATURATION: 96 %

## 2017-07-28 PROBLEM — R11.2 NAUSEA & VOMITING: Status: RESOLVED | Noted: 2017-07-27 | Resolved: 2017-07-28

## 2017-07-28 PROBLEM — M79.606 LEG PAIN, ANTERIOR: Status: RESOLVED | Noted: 2017-07-27 | Resolved: 2017-07-28

## 2017-07-28 LAB
ERYTHROCYTE [DISTWIDTH] IN BLOOD BY AUTOMATED COUNT: 41.2 FL (ref 35.9–50)
HCT VFR BLD AUTO: 32.6 % (ref 37–47)
HGB BLD-MCNC: 11.4 G/DL (ref 12–16)
MCH RBC QN AUTO: 31.4 PG (ref 27–33)
MCHC RBC AUTO-ENTMCNC: 35 G/DL (ref 33.6–35)
MCV RBC AUTO: 89.8 FL (ref 81.4–97.8)
PLATELET # BLD AUTO: 189 K/UL (ref 164–446)
PMV BLD AUTO: 9 FL (ref 9–12.9)
RBC # BLD AUTO: 3.63 M/UL (ref 4.2–5.4)
WBC # BLD AUTO: 7.5 K/UL (ref 4.8–10.8)

## 2017-07-28 PROCEDURE — 700111 HCHG RX REV CODE 636 W/ 250 OVERRIDE (IP): Performed by: HOSPITALIST

## 2017-07-28 PROCEDURE — 306588 SLEEVE,VASO CALF MED: Performed by: HOSPITALIST

## 2017-07-28 PROCEDURE — A9270 NON-COVERED ITEM OR SERVICE: HCPCS | Performed by: HOSPITALIST

## 2017-07-28 PROCEDURE — 85027 COMPLETE CBC AUTOMATED: CPT

## 2017-07-28 PROCEDURE — G0378 HOSPITAL OBSERVATION PER HR: HCPCS

## 2017-07-28 PROCEDURE — 96375 TX/PRO/DX INJ NEW DRUG ADDON: CPT

## 2017-07-28 PROCEDURE — 96361 HYDRATE IV INFUSION ADD-ON: CPT

## 2017-07-28 PROCEDURE — 36415 COLL VENOUS BLD VENIPUNCTURE: CPT

## 2017-07-28 PROCEDURE — 700111 HCHG RX REV CODE 636 W/ 250 OVERRIDE (IP): Performed by: NURSE PRACTITIONER

## 2017-07-28 PROCEDURE — 96376 TX/PRO/DX INJ SAME DRUG ADON: CPT

## 2017-07-28 PROCEDURE — 99217 PR OBSERVATION CARE DISCHARGE: CPT | Performed by: HOSPITALIST

## 2017-07-28 PROCEDURE — 700102 HCHG RX REV CODE 250 W/ 637 OVERRIDE(OP): Performed by: HOSPITALIST

## 2017-07-28 RX ORDER — LABETALOL HYDROCHLORIDE 5 MG/ML
10 INJECTION, SOLUTION INTRAVENOUS EVERY 4 HOURS PRN
Status: DISCONTINUED | OUTPATIENT
Start: 2017-07-28 | End: 2017-07-28 | Stop reason: HOSPADM

## 2017-07-28 RX ORDER — HYDRALAZINE HYDROCHLORIDE 20 MG/ML
20 INJECTION INTRAMUSCULAR; INTRAVENOUS EVERY 6 HOURS PRN
Status: DISCONTINUED | OUTPATIENT
Start: 2017-07-28 | End: 2017-07-28 | Stop reason: HOSPADM

## 2017-07-28 RX ADMIN — AMLODIPINE BESYLATE 5 MG: 5 TABLET ORAL at 09:12

## 2017-07-28 RX ADMIN — ONDANSETRON 4 MG: 2 INJECTION INTRAMUSCULAR; INTRAVENOUS at 00:57

## 2017-07-28 RX ADMIN — LEVOTHYROXINE SODIUM 100 MCG: 100 TABLET ORAL at 07:00

## 2017-07-28 RX ADMIN — HYDRALAZINE HYDROCHLORIDE 20 MG: 20 INJECTION INTRAMUSCULAR; INTRAVENOUS at 14:08

## 2017-07-28 RX ADMIN — HYDRALAZINE HYDROCHLORIDE 25 MG: 25 TABLET, FILM COATED ORAL at 00:57

## 2017-07-28 RX ADMIN — HYDRALAZINE HYDROCHLORIDE 20 MG: 20 INJECTION INTRAMUSCULAR; INTRAVENOUS at 04:05

## 2017-07-28 RX ADMIN — HYDRALAZINE HYDROCHLORIDE 25 MG: 25 TABLET, FILM COATED ORAL at 09:12

## 2017-07-28 ASSESSMENT — PAIN SCALES - WONG BAKER
WONGBAKER_NUMERICALRESPONSE: DOESN'T HURT AT ALL

## 2017-07-28 ASSESSMENT — ENCOUNTER SYMPTOMS
TREMORS: 0
ORTHOPNEA: 0
DOUBLE VISION: 0
DEPRESSION: 0
HEARTBURN: 0
MYALGIAS: 0
COUGH: 0
BRUISES/BLEEDS EASILY: 0

## 2017-07-28 ASSESSMENT — PAIN SCALES - GENERAL
PAINLEVEL_OUTOF10: 0

## 2017-07-28 ASSESSMENT — LIFESTYLE VARIABLES
EVER_SMOKED: NEVER
DO YOU DRINK ALCOHOL: NO

## 2017-07-28 NOTE — ED NOTES
Pt vomited small amount clear/pink fluid. IVF infusing. Medicated with Reglan per orders. Rv'wd plan for admission with Pt and family.

## 2017-07-28 NOTE — ED NOTES
Scheduled amlodipine given. Hydralazine not available in [facility]. Waiting for room upstairs. Pt calm, in good spirits, states the pain in her leg is a little better. Water and warm blankets provided.

## 2017-07-28 NOTE — DISCHARGE PLANNING
Pt currently living on the first floor a  Living community.  Pt states she ambulates with a FWW but this allows her to get around her home and around the facility.  Pt no longer drives, her son gets her to and from MD visits and helps her with food shopping as needed.       Care Transition Team Assessment    Information Source  Orientation : Oriented x 4  Information Given By: Patient  Who is responsible for making decisions for patient? : Patient         Elopement Risk  Legal Hold: No    Interdisciplinary Discharge Planning  Does Admitting Nurse Feel This Could be a Complex Discharge?: No  Primary Care Physician: Kirill Friedman  Lives with - Patient's Self Care Capacity: Alone and Able to Care For Self  Support Systems: Family Member(s)  Housing / Facility: 1 Story Apartment / Condo  Name of Care Facility: [facility] (independent living)  Do You Take your Prescribed Medications Regularly: Yes  Able to Return to Previous ADL's: Yes  Mobility Issues: Yes  Prior Services: None  Patient Expects to be Discharged to:: home  Assistance Needed: Unknown at this Time  Durable Medical Equipment: Walker, Other - Specify (grab bars)    Discharge Preparedness  What is your plan after discharge?: Home with help  What are your discharge supports?: Child  Prior Functional Level: Ambulatory, Independent with Activities of Daily Living    Functional Assesment  Prior Functional Level: Ambulatory, Independent with Activities of Daily Living    Finances  Financial Barriers to Discharge: No  Prescription Coverage: Yes    Vision / Hearing Impairment  Hearing Impairment : Yes  Hearing Impairment: Both Ears, Hearing Device Not Available  Does Pt Need Special Equipment for the Hearing Impaired?: Yes-But Does not Need for Facility to Arrange Equipment              Domestic Abuse  Have you ever been the victim of abuse or violence?: No    Psychological Assessment  History of Substance Abuse: None    Discharge Risks or Barriers  Discharge  risks or barriers?: No    Anticipated Discharge Information  Anticipated discharge disposition: Home  Discharge Address: [address] [place] [address] [address]  Discharge Contact Phone Number: [phone]

## 2017-07-28 NOTE — PROGRESS NOTES
Pt consumed 50% of clear liquid breakfast meal.  Pt denies any nausea/vomitting.  Will advance diet per order

## 2017-07-28 NOTE — ED NOTES
Pt still c/o nausea, increased after Morphine was given. ERP notified, no new orders at this time. POC rv'wd with Pt and family. SpO2 was 93% on RA; O2 applied at 1L NC.

## 2017-07-28 NOTE — DISCHARGE INSTRUCTIONS
D/c [facility] for f/u  PCP within a week  ED if emergency      Discharge Instructions    Discharged to home by car with relative. Discharged via wheelchair, hospital escort: Yes.  Special equipment needed: Not Applicable    Be sure to schedule a follow-up appointment with your primary care doctor or any specialists as instructed.     Discharge Plan:   Influenza Vaccine Indication: Patient Refuses    I understand that a diet low in cholesterol, fat, and sodium is recommended for good health. Unless I have been given specific instructions below for another diet, I accept this instruction as my diet prescription.   Other diet: Heart healthy     Special Instructions: None    · Is patient discharged on Warfarin / Coumadin?   No     · Is patient Post Blood Transfusion?  No    Depression / Suicide Risk    As you are discharged from this [facility], it is important to learn how to keep safe from harming yourself.    Recognize the warning signs:  · Abrupt changes in personality, positive or negative- including increase in energy   · Giving away possessions  · Change in eating patterns- significant weight changes-  positive or negative  · Change in sleeping patterns- unable to sleep or sleeping all the time   · Unwillingness or inability to communicate  · Depression  · Unusual sadness, discouragement and loneliness  · Talk of wanting to die  · Neglect of personal appearance   · Rebelliousness- reckless behavior  · Withdrawal from people/activities they love  · Confusion- inability to concentrate     If you or a loved one observes any of these behaviors or has concerns about self-harm, here's what you can do:  · Talk about it- your feelings and reasons for harming yourself  · Remove any means that you might use to hurt yourself (examples: pills, rope, extension cords, firearm)  · Get professional help from the community (Mental Health, Substance Abuse, psychological counseling)  · Do not be alone:Call your  Safe Contact- someone whom you trust who will be there for you.  · Call your local CRISIS HOTLINE 582-8313 or 559-418-0837  · Call your local Children's Mobile Crisis Response Team [place] (472) 283-4824 or www.Edai  · Call the toll free National Suicide Prevention Hotlines   · National Suicide Prevention Lifeline 151-977-SHBX (0446)  · National [employer] Line Network 800-SUICIDE (961-7692)

## 2017-07-28 NOTE — ASSESSMENT & PLAN NOTE
Probably due to fentanyl administration. Better now, she wants to drink, will start CLD and advance as tolerated.

## 2017-07-28 NOTE — PROGRESS NOTES
"Assumed patient care. Report received from OTIS Shafer.  Pt A&Ox4.  Respirations even, unlabored on room air.  Pt denies any pain at this time.  Pt denies any nausea/vomitting.  Pt states \"feeling a lot better\", eager to go home.    Call light and personal belongings within reach.  Bed in locked, lowest position.  Pt updated on POC, updated communication board.  Needs met, will continue to monitor.    "

## 2017-07-28 NOTE — DISCHARGE SUMMARY
CHIEF COMPLAINT ON ADMISSION  Chief Complaint   Patient presents with   • Leg Pain     Chronic Arthritis.  Got steroid injection R hip today.  Pain now worse.  IV inplace.  100 fentanyl and 4 zofran given PTA.       CODE STATUS  Full Code    HPI & HOSPITAL COURSE  This is a 84 y.o. female with pmh of HL, HTN, arthritis who presented yesterday due to increased left leg pain. As per patient she is been having this pain for several months now she went to see her orthopedic surgeon and she had a steroid injection today after injection she felt better but 1 hr later her pain came back and it was worse     Her symptoms are no resolved. Will have her f/u with her PCP for BP management and ortho as OP.    Therefore, she is discharged in good and stable condition with close outpatient follow-up.    SPECIFIC OUTPATIENT FOLLOW-UP  PCP within a week  Ortho within a week    DISCHARGE PROBLEM LIST  Active Problems:    Essential hypertension POA: Yes    Acquired hypothyroidism POA: Yes  Resolved Problems:    Nausea & vomiting POA: Yes    Leg pain, anterior POA: Unknown      FOLLOW UP  Future Appointments  Date Time Provider Department Center   8/4/2017 1:00 PM Kirill Friedman M.D. [address]   9/14/2017 2:40 PM Kirill Friedman M.D. [address]     No follow-up provider specified.    MEDICATIONS ON DISCHARGE   Felipa Loyd   Home Medication Instructions HALEY:[id number]    Printed on:07/28/17 1412   Medication Information                      amlodipine (NORVASC) 5 MG Tab  Take 1 Tab by mouth 2 Times a Day.             Cholecalciferol (VITAMIN D-3 PO)  Take 1 Cap by mouth every evening.             hydrALAZINE (APRESOLINE) 25 MG Tab  Take 1 Tab by mouth 2 Times a Day.             levothyroxine (SYNTHROID) 100 MCG Tab  Take 1 Tab by mouth every morning before breakfast.                 DIET  Orders Placed This Encounter   Procedures   • Diet Order     Standing Status: Standing      Number of Occurrences: 1       Standing Expiration Date:      Order Specific Question:  Diet:     Answer:  Full Liquid [11]      Comments:  advance as tolerated.       ACTIVITY  As tolerated.    CONSULTATIONS  none    PROCEDURES  none    LABORATORY  Lab Results   Component Value Date/Time    SODIUM 135 07/27/2017 09:12 PM    POTASSIUM 3.6 07/27/2017 09:12 PM    CHLORIDE 106 07/27/2017 09:12 PM    CO2 19* 07/27/2017 09:12 PM    GLUCOSE 196* 07/27/2017 09:12 PM    BUN 23* 07/27/2017 09:12 PM    CREATININE 1.08 07/27/2017 09:12 PM        Lab Results   Component Value Date/Time    WBC 7.5 07/28/2017 03:36 AM    HEMOGLOBIN 11.4* 07/28/2017 03:36 AM    HEMATOCRIT 32.6* 07/28/2017 03:36 AM    PLATELET COUNT 189 07/28/2017 03:36 AM        Total time of the discharge process exceeds 32 minutes

## 2017-07-28 NOTE — PROGRESS NOTES
IV dc'd.  Discharge instructions given to patient; patient verbalizes understanding, all questions answered.  Copy of DC summary provided, signed copy in chart.  Pt states personal belongings are in possession.  Pt escorted off unit by tech without incident.

## 2017-07-28 NOTE — H&P
Hospital Medicine History and Physical    Date of Service  7/27/2017    Chief Complaint  Chief Complaint   Patient presents with   • Leg Pain     Chronic Arthritis.  Got steroid injection R hip today.  Pain now worse.  IV inplace.  100 fentanyl and 4 zofran given PTA.       History of Presenting Illness  84 y.o. female who presented 7/27/2017, has pmh of HL, HTN, arthritis she is coming today due to increased left leg pain, as per patient she is been having this pain for several months now she went to see her orthopedic surgeon and she had a steroid injection today after injection she felt better but 1 hr later her pain came back and it was worse than before, she called EMS and in the ambulance she got iv fentanyl for pain she started having nausea and vomiting and is feeling weak, in the ER she received morphine for her pain and now her pain is better controlled, she denies any numbness, tingling, weakness on her left leg, no skin changes, no swelling, patient continue to be nauseated and ERP recommended admission for observation overnight, when I saw patient she was in her ER room she is alert and orient in good spirit, able to give good history, she denies fever, chills, sob, chest pain, palpitation, had chronic constipation that is stable, no dysuria or hematuria.     Primary Care Physician  Kirill Friedman M.D.    Consultants  none    Code Status  Full code    Review of Systems  Review of Systems   Constitutional: Negative for malaise/fatigue.   HENT: Negative for ear pain.    Eyes: Negative for double vision.   Respiratory: Negative for cough.    Cardiovascular: Negative for orthopnea.   Gastrointestinal: Negative for heartburn.   Genitourinary: Negative for dysuria.   Musculoskeletal: Negative for myalgias.   Skin: Negative for rash.   Neurological: Negative for tremors.   Endo/Heme/Allergies: Does not bruise/bleed easily.   Psychiatric/Behavioral: Negative for depression.          Past Medical History  Past  Medical History   Diagnosis Date   • Hyperlipidemia 2015   • Cholelithiasis 2015   • Hypertension    • Arthritis        Surgical History  History reviewed. No pertinent past surgical history.    Medications  No current facility-administered medications on file prior to encounter.     Current Outpatient Prescriptions on File Prior to Encounter   Medication Sig Dispense Refill   • amlodipine (NORVASC) 5 MG Tab Take 1 Tab by mouth 2 Times a Day. 180 Tab 4   • hydrALAZINE (APRESOLINE) 25 MG Tab Take 1 Tab by mouth 2 Times a Day. 180 Tab 4   • levothyroxine (SYNTHROID) 100 MCG Tab Take 1 Tab by mouth every morning before breakfast. 90 Tab 3   • Cholecalciferol (VITAMIN D-3 PO) Take 1 Cap by mouth every evening.         Family History  Family History   Problem Relation Age of Onset   • Thyroid Mother    • Heart Attack Father      MI at age 65   • Hypertension Sister    • Diabetes Sister        Social History  Social History   Substance Use Topics   • Smoking status: Former Smoker     Quit date: 1962   • Smokeless tobacco: Never Used   • Alcohol Use: 0.0 oz/week     0 Standard drinks or equivalent per week       Allergies  Allergies   Allergen Reactions   • Lisinopril Diarrhea, Nausea and Cough     GI problems, lost control   • Losartan Nausea and Cough   • Pcn [Penicillins] Hives     Reaction in         Physical Exam  Laboratory   Hemodynamics  Temp (24hrs), Av.3 °C (97.3 °F), Min:36.3 °C (97.3 °F), Max:36.3 °C (97.3 °F)   Temperature: 36.3 °C (97.3 °F)  Pulse  Av  Min: 66  Max: 75    Blood Pressure : 128/50 mmHg, NIBP: (!) 170/79 mmHg      Respiratory      Respiration: 16, Pulse Oximetry: 95 %             Physical Exam   Constitutional: She is oriented to person, place, and time. She appears well-developed and well-nourished. No distress.   HENT:   Head: Normocephalic.   Eyes: Right eye exhibits no discharge. Left eye exhibits no discharge. No scleral icterus.   Neck: Normal range of motion.  Neck supple.   Cardiovascular: Normal rate, regular rhythm and normal heart sounds.    Pulmonary/Chest: Effort normal and breath sounds normal. She has no wheezes.   Abdominal: Soft. Bowel sounds are normal. She exhibits no distension.   Musculoskeletal: She exhibits no edema.   Neurological: She is alert and oriented to person, place, and time. No cranial nerve deficit.   Skin: No rash noted. She is not diaphoretic.   Psychiatric: She has a normal mood and affect.       Recent Labs      07/27/17 2112   WBC  11.1*   RBC  4.21   HEMOGLOBIN  12.8   HEMATOCRIT  38.0   MCV  90.3   MCH  30.4   MCHC  33.7   RDW  41.3   PLATELETCT  201   MPV  8.9*     Recent Labs      07/27/17 2112   SODIUM  135   POTASSIUM  3.6   CHLORIDE  106   CO2  19*   GLUCOSE  196*   BUN  23*   CREATININE  1.08   CALCIUM  9.4     Recent Labs      07/27/17 2112   ALTSGPT  12   ASTSGOT  16   ALKPHOSPHAT  48   TBILIRUBIN  0.6   LIPASE  7*   GLUCOSE  196*                 Lab Results   Component Value Date    TROPONINI <0.01 07/27/2017       Imaging  none   Assessment/Plan     I anticipate this patient is appropriate for observation status at this time.    Essential hypertension (present on admission)  Assessment & Plan  Stable on amlodipine and hydralazine.     Acquired hypothyroidism (present on admission)  Assessment & Plan  Continue supplements    Nausea & vomiting (present on admission)  Assessment & Plan  Probably due to fentanyl administration. Better now, she wants to drink, will start CLD and advance as tolerated.     Leg pain, anterior  Assessment & Plan  Acute on chronic after steroid injection. Pain is better controlled now, will continue with po pain medication and iv morphine if needed. She will need to f/u with ortho as outpatient. Pulses in lower ext are present.      VTE prophylaxis: scd's .

## 2017-07-28 NOTE — ED PROVIDER NOTES
"ED Provider Note    CHIEF COMPLAINT  Chief Complaint   Patient presents with   • Leg Pain     Chronic Arthritis.  Got steroid injection R hip today.  Pain now worse.  IV inplace.  100 fentanyl and 4 zofran given PTA.       HPI  Felipa Loyd is a 84 y.o. female who presents for evaluation of left thigh pain, this is chronic, she went orthopedic surgeon and had a cortisone injection in her hip for this pain today and states her pain was improved for about an hour and then came back. She said is now worse. No injuries, no weakness numbness or tingling, no fever. She comes to my notes today for evaluation of this, she received 100 mg of fentanyl on the ambulance and had some vomiting as well. She offers no other complaints    REVIEW OF SYSTEMS  Negative for fever, weakness, numbness, abdominal pain.    PAST MEDICAL HISTORY   has a past medical history of Hyperlipidemia (1/6/2015); Cholelithiasis (1/6/2015); Hypertension; and Arthritis.    SOCIAL HISTORY  Social History     Social History Main Topics   • Smoking status: Former Smoker     Quit date: 01/06/1962   • Smokeless tobacco: Never Used   • Alcohol Use: 0.0 oz/week     0 Standard drinks or equivalent per week   • Drug Use: No   • Sexual Activity: No       SURGICAL HISTORY  patient denies any surgical history    CURRENT MEDICATIONS  I personally reviewed the medication list in the charting documentation.     ALLERGIES  Allergies   Allergen Reactions   • Lisinopril Diarrhea, Nausea and Cough     GI problems, lost control   • Losartan Nausea and Cough   • Pcn [Penicillins]        PHYSICAL EXAM  VITAL SIGNS: /50 mmHg  Pulse 72  Temp(Src) 36.3 °C (97.3 °F)  Resp 16  Ht 1.626 m (5' 4.02\")  Wt 68.48 kg (150 lb 15.5 oz)  BMI 25.90 kg/m2  SpO2 100%  Constitutional: Alert in no apparent distress.  HENT: No signs of trauma.   Eyes: Conjunctiva normal, Non-icteric.   Chest: Normal nonlabored respirations  Skin: No erythema, No rash.   Musculoskeletal: " Unremarkable inspection the left leg, no tenderness. The injection site from earlier today in the anterior hip looks good without erythema. She has full range of motion of the hip and knee. Neurovascularly intact distally.  Neurologic: Alert, No focal deficits noted.   Psychiatric: Affect normal, Judgment normal.    COURSE & MEDICAL DECISION MAKING  Pertinent Labs & Imaging studies reviewed. (See chart for details)    Results for orders placed or performed during the hospital encounter of 07/27/17   CBC WITH DIFFERENTIAL   Result Value Ref Range    WBC 11.1 (H) 4.8 - 10.8 K/uL    RBC 4.21 4.20 - 5.40 M/uL    Hemoglobin 12.8 12.0 - 16.0 g/dL    Hematocrit 38.0 37.0 - 47.0 %    MCV 90.3 81.4 - 97.8 fL    MCH 30.4 27.0 - 33.0 pg    MCHC 33.7 33.6 - 35.0 g/dL    RDW 41.3 35.9 - 50.0 fL    Platelet Count 201 164 - 446 K/uL    MPV 8.9 (L) 9.0 - 12.9 fL    Neutrophils-Polys 94.20 (H) 44.00 - 72.00 %    Lymphocytes 4.20 (L) 22.00 - 41.00 %    Monocytes 0.70 0.00 - 13.40 %    Eosinophils 0.00 0.00 - 6.90 %    Basophils 0.10 0.00 - 1.80 %    Immature Granulocytes 0.80 0.00 - 0.90 %    Nucleated RBC 0.00 /100 WBC    Neutrophils (Absolute) 10.43 (H) 2.00 - 7.15 K/uL    Lymphs (Absolute) 0.47 (L) 1.00 - 4.80 K/uL    Monos (Absolute) 0.08 0.00 - 0.85 K/uL    Eos (Absolute) 0.00 0.00 - 0.51 K/uL    Baso (Absolute) 0.01 0.00 - 0.12 K/uL    Immature Granulocytes (abs) 0.09 0.00 - 0.11 K/uL    NRBC (Absolute) 0.00 K/uL   COMP METABOLIC PANEL   Result Value Ref Range    Sodium 135 135 - 145 mmol/L    Potassium 3.6 3.6 - 5.5 mmol/L    Chloride 106 96 - 112 mmol/L    Co2 19 (L) 20 - 33 mmol/L    Anion Gap 10.0 0.0 - 11.9    Glucose 196 (H) 65 - 99 mg/dL    Bun 23 (H) 8 - 22 mg/dL    Creatinine 1.08 0.50 - 1.40 mg/dL    Calcium 9.4 8.5 - 10.5 mg/dL    AST(SGOT) 16 12 - 45 U/L    ALT(SGPT) 12 2 - 50 U/L    Alkaline Phosphatase 48 30 - 99 U/L    Total Bilirubin 0.6 0.1 - 1.5 mg/dL    Albumin 4.1 3.2 - 4.9 g/dL    Total Protein 7.1 6.0 -  8.2 g/dL    Globulin 3.0 1.9 - 3.5 g/dL    A-G Ratio 1.4 g/dL   LIPASE   Result Value Ref Range    Lipase 7 (L) 11 - 82 U/L   URINALYSIS CULTURE, IF INDICATED   Result Value Ref Range    Color Yellow     Character Clear     Specific Gravity 1.020 <1.035    Ph 6.0 5.0-8.0    Glucose 100 (A) Negative mg/dL    Ketones 15 (A) Negative mg/dL    Protein 100 (A) Negative mg/dL    Bilirubin Negative Negative    Urobilinogen, Urine 0.2 Negative    Nitrite Negative Negative    Leukocyte Esterase Small (A) Negative    Occult Blood Negative Negative    Micro Urine Req Microscopic     Culture Indicated Yes UA Culture   TROPONIN   Result Value Ref Range    Troponin I <0.01 0.00 - 0.04 ng/mL   ESTIMATED GFR   Result Value Ref Range    GFR If  58 (A) >60 mL/min/1.73 m 2    GFR If Non  48 (A) >60 mL/min/1.73 m 2   URINE CULTURE(NEW)   Result Value Ref Range    Significant Indicator NEG     Source UR     Site      Urine Culture Culture in progress.    URINE MICROSCOPIC (W/UA)   Result Value Ref Range    WBC 5-10 (A) /hpf    RBC 0-2 /hpf    Bacteria Few (A) None /hpf    Epithelial Cells Few /hpf    Mucous Threads Few /hpf       Encounter Summary: This is a 84 y.o. female with continuing and may be even worse than left leg pain, had a cortisone injection today which was briefly successful in improving her symptoms, however after an hour or 2, symptoms returned in fact she states her little worse. She denies any weakness or numbness, no injury, unremarkable examination. Will try to get her pain under control with a little bit of IV morphine although she vomited after fentanyl and ambulance so I will do this cautiously. If this is successful will attempt to transition her onto [place] for discharge, she will then follow-up with her primary care physician tomorrow. -------  unfortunately the patient continues to vomit, she received 2 doses of Zofran and a dose of Reglan and still feels nauseated and vomiting.  Her pain seems to be better but at this time I can't get her to stop vomiting. Will check some blood work, messages were IV fluids and ultimately she'll be admitted to the hospital given her intractable vomiting. The daughter-in-law at the bedside agrees to this plan      DISPOSITION: Admitted in guarded condition      FINAL IMPRESSION  1. Pain in left leg    2. Intractable vomiting with nausea, unspecified vomiting type        This dictation was created using voice recognition software. The accuracy of the dictation is limited to the abilities of the software. I expect there may be some errors of grammar and possibly content. The nursing notes were reviewed and certain aspects of this information were incorporated into this note.    Electronically signed by: Ruddy Chaudhari, 7/27/2017 7:05 PM

## 2017-07-28 NOTE — ED NOTES
Felipa Loyd 84 y.o. female   Chief Complaint   Patient presents with   • Leg Pain     Chronic Arthritis.  Got steroid injection R hip today.  Pain now worse.  IV inplace.  100 fentanyl and 4 zofran given PTA.      BIB [facility].  C/o nausea after fentanyl.  Nausea persists.  Chart placed for ERP.

## 2017-07-28 NOTE — PROGRESS NOTES
Received from yellow  pod, aox4,  unsteady on her  feet. Denies pain or sob. Call light within reach. Needs attended. Plan of care discussed and understood.

## 2017-07-28 NOTE — ASSESSMENT & PLAN NOTE
Acute on chronic after steroid injection. Pain is better controlled now, will continue with po pain medication and iv morphine if needed. She will need to f/u with ortho as outpatient. Pulses in lower ext are present.

## 2017-07-29 LAB
BACTERIA UR CULT: ABNORMAL
BACTERIA UR CULT: ABNORMAL
SIGNIFICANT IND 70042: ABNORMAL
SITE SITE: ABNORMAL
SOURCE SOURCE: ABNORMAL

## 2017-08-02 ENCOUNTER — APPOINTMENT (OUTPATIENT)
Dept: RADIOLOGY | Facility: MEDICAL CENTER | Age: 82
End: 2017-08-02
Attending: EMERGENCY MEDICINE
Payer: MEDICARE

## 2017-08-02 ENCOUNTER — HOSPITAL ENCOUNTER (EMERGENCY)
Facility: MEDICAL CENTER | Age: 82
End: 2017-08-02
Attending: EMERGENCY MEDICINE
Payer: MEDICARE

## 2017-08-02 VITALS
RESPIRATION RATE: 15 BRPM | HEIGHT: 65 IN | TEMPERATURE: 98.5 F | BODY MASS INDEX: 25.83 KG/M2 | OXYGEN SATURATION: 100 % | SYSTOLIC BLOOD PRESSURE: 168 MMHG | DIASTOLIC BLOOD PRESSURE: 66 MMHG | WEIGHT: 155 LBS | HEART RATE: 68 BPM

## 2017-08-02 DIAGNOSIS — R42 DIZZINESS: ICD-10-CM

## 2017-08-02 LAB
ALBUMIN SERPL BCP-MCNC: 3.7 G/DL (ref 3.2–4.9)
ALBUMIN/GLOB SERPL: 1.3 G/DL
ALP SERPL-CCNC: 54 U/L (ref 30–99)
ALT SERPL-CCNC: 15 U/L (ref 2–50)
ANION GAP SERPL CALC-SCNC: 8 MMOL/L (ref 0–11.9)
AST SERPL-CCNC: 15 U/L (ref 12–45)
BASOPHILS # BLD AUTO: 0.6 % (ref 0–1.8)
BASOPHILS # BLD: 0.04 K/UL (ref 0–0.12)
BILIRUB SERPL-MCNC: 1 MG/DL (ref 0.1–1.5)
BNP SERPL-MCNC: 91 PG/ML (ref 0–100)
BUN SERPL-MCNC: 19 MG/DL (ref 8–22)
CALCIUM SERPL-MCNC: 9.1 MG/DL (ref 8.5–10.5)
CHLORIDE SERPL-SCNC: 107 MMOL/L (ref 96–112)
CO2 SERPL-SCNC: 23 MMOL/L (ref 20–33)
CREAT SERPL-MCNC: 0.9 MG/DL (ref 0.5–1.4)
EKG IMPRESSION: NORMAL
EOSINOPHIL # BLD AUTO: 0.02 K/UL (ref 0–0.51)
EOSINOPHIL NFR BLD: 0.3 % (ref 0–6.9)
ERYTHROCYTE [DISTWIDTH] IN BLOOD BY AUTOMATED COUNT: 42.4 FL (ref 35.9–50)
GFR SERPL CREATININE-BSD FRML MDRD: 60 ML/MIN/1.73 M 2
GLOBULIN SER CALC-MCNC: 2.8 G/DL (ref 1.9–3.5)
GLUCOSE SERPL-MCNC: 98 MG/DL (ref 65–99)
HCT VFR BLD AUTO: 35.4 % (ref 37–47)
HGB BLD-MCNC: 12 G/DL (ref 12–16)
IMM GRANULOCYTES # BLD AUTO: 0.03 K/UL (ref 0–0.11)
IMM GRANULOCYTES NFR BLD AUTO: 0.4 % (ref 0–0.9)
INR PPP: 1.09 (ref 0.87–1.13)
LYMPHOCYTES # BLD AUTO: 1.42 K/UL (ref 1–4.8)
LYMPHOCYTES NFR BLD: 20.4 % (ref 22–41)
MCH RBC QN AUTO: 30.8 PG (ref 27–33)
MCHC RBC AUTO-ENTMCNC: 33.9 G/DL (ref 33.6–35)
MCV RBC AUTO: 90.8 FL (ref 81.4–97.8)
MONOCYTES # BLD AUTO: 0.49 K/UL (ref 0–0.85)
MONOCYTES NFR BLD AUTO: 7.1 % (ref 0–13.4)
NEUTROPHILS # BLD AUTO: 4.95 K/UL (ref 2–7.15)
NEUTROPHILS NFR BLD: 71.2 % (ref 44–72)
NRBC # BLD AUTO: 0 K/UL
NRBC BLD AUTO-RTO: 0 /100 WBC
PLATELET # BLD AUTO: 223 K/UL (ref 164–446)
PMV BLD AUTO: 8.9 FL (ref 9–12.9)
POTASSIUM SERPL-SCNC: 3.3 MMOL/L (ref 3.6–5.5)
PROT SERPL-MCNC: 6.5 G/DL (ref 6–8.2)
PROTHROMBIN TIME: 14.4 SEC (ref 12–14.6)
RBC # BLD AUTO: 3.9 M/UL (ref 4.2–5.4)
SODIUM SERPL-SCNC: 138 MMOL/L (ref 135–145)
TROPONIN I SERPL-MCNC: 0.05 NG/ML (ref 0–0.04)
WBC # BLD AUTO: 7 K/UL (ref 4.8–10.8)

## 2017-08-02 PROCEDURE — 83880 ASSAY OF NATRIURETIC PEPTIDE: CPT

## 2017-08-02 PROCEDURE — 85610 PROTHROMBIN TIME: CPT

## 2017-08-02 PROCEDURE — 70450 CT HEAD/BRAIN W/O DYE: CPT

## 2017-08-02 PROCEDURE — 80053 COMPREHEN METABOLIC PANEL: CPT

## 2017-08-02 PROCEDURE — 36415 COLL VENOUS BLD VENIPUNCTURE: CPT

## 2017-08-02 PROCEDURE — 700105 HCHG RX REV CODE 258: Performed by: EMERGENCY MEDICINE

## 2017-08-02 PROCEDURE — 84484 ASSAY OF TROPONIN QUANT: CPT

## 2017-08-02 PROCEDURE — 93005 ELECTROCARDIOGRAM TRACING: CPT | Performed by: EMERGENCY MEDICINE

## 2017-08-02 PROCEDURE — 71010 DX-CHEST-PORTABLE (1 VIEW): CPT

## 2017-08-02 PROCEDURE — 99284 EMERGENCY DEPT VISIT MOD MDM: CPT

## 2017-08-02 PROCEDURE — 85025 COMPLETE CBC W/AUTO DIFF WBC: CPT

## 2017-08-02 RX ORDER — SODIUM CHLORIDE 9 MG/ML
500 INJECTION, SOLUTION INTRAVENOUS ONCE
Status: COMPLETED | OUTPATIENT
Start: 2017-08-02 | End: 2017-08-02

## 2017-08-02 RX ADMIN — SODIUM CHLORIDE 500 ML: 9 INJECTION, SOLUTION INTRAVENOUS at 10:30

## 2017-08-02 ASSESSMENT — PAIN SCALES - GENERAL: PAINLEVEL_OUTOF10: 0

## 2017-08-02 NOTE — ED NOTES
Chief Complaint   Patient presents with   • Dizziness     Patient bib [facility], c/o sudden onset of dizziness this am, states she felt like she was going to fall to the left, also c/o nausea. Patient was given Zofran in route. Now feeling better. AAOx4, VSS, ERP to [facility].

## 2017-08-02 NOTE — ED AVS SNAPSHOT
Coursmos Access Code: Activation code not generated  Current Coursmos Status: Active    [employer]  A secure, online tool to manage your health information     [employer]’s Coursmos® is a secure, online tool that connects you to your personalized health information from the privacy of your home -- day or night - making it very easy for you to manage your healthcare. Once the activation process is completed, you can even access your medical information using the Coursmos sabrina, which is available for free in the Apple Sabrina store or Google Play store.     Coursmos provides the following levels of access (as shown below):   My Chart Features   [facility] Primary Care Doctor [facility]  Specialists [facility]  Urgent  Care Non-[facility]  Primary Care  Doctor   Email your healthcare team securely and privately 24/7 X X X X   Manage appointments: schedule your next appointment; view details of past/upcoming appointments X      Request prescription refills. X      View recent personal medical records, including lab and immunizations X X X X   View health record, including health history, allergies, medications X X X X   Read reports about your outpatient visits, procedures, consult and ER notes X X X X   See your discharge summary, which is a recap of your hospital and/or ER visit that includes your diagnosis, lab results, and care plan. X X       How to register for Coursmos:  1. Go to  [email or web address].  2. Click on the Sign Up Now box, which takes you to the New Member Sign Up page. You will need to provide the following information:  a. Enter your Coursmos Access Code exactly as it appears at the top of this page. (You will not need to use this code after you’ve completed the sign-up process. If you do not sign up before the expiration date, you must request a new code.)   b. Enter your date of birth.   c. Enter your home email address.   d. Click Submit, and follow the next screen’s instructions.  3. Create a Coursmos ID. This will  be your [employer] login ID and cannot be changed, so think of one that is secure and easy to remember.  4. Create a UniSmart password. You can change your password at any time.  5. Enter your Password Reset Question and Answer. This can be used at a later time if you forget your password.   6. Enter your e-mail address. This allows you to receive e-mail notifications when new information is available in UniSmart.  7. Click Sign Up. You can now view your health information.    For assistance activating your UniSmart account, call [phone]

## 2017-08-02 NOTE — ED PROVIDER NOTES
"ED Provider Note    CHIEF COMPLAINT  Chief Complaint   Patient presents with   • Dizziness       HPI  Felipa Loyd is a 84 y.o. female who presents by ambulance for evaluation of sudden onset of dizziness and lightheadedness. Symptoms started 1-2 hours ago. She takes Norvasc and Apresoline for hypertension. She's also had thin Synthroid. No headache, no jaw pain, no chest pain, no difficulty breathing. No abdominal pain. No speech difficulty. No weakness in her arms or legs. She says she just felt a little funny.    REVIEW OF SYSTEMS  No headache, no jaw pain, no chest pain, no difficulty breathing. No vomiting or diarrhea. No melena or blood in her bowel movement.  ALL OTHER SYSTEMS NEGATIVE    ALLERGIES  Allergies   Allergen Reactions   • Lisinopril Diarrhea, Nausea and Cough     GI problems, lost control   • Losartan Nausea and Cough   • Pcn [Penicillins] Hives     Reaction in 1967       CURRENT MEDICATIONS  Norvasc, Apresoline, Synthroid.    PAST MEDICAL HISTORY  Past Medical History   Diagnosis Date   • Hyperlipidemia 1/6/2015   • Cholelithiasis 1/6/2015   • Hypertension    • Arthritis      FAMILY HISTORY  Family History   Problem Relation Age of Onset   • Thyroid Mother    • Heart Attack Father      MI at age 65   • Hypertension Sister    • Diabetes Sister        SOCIAL HISTORY  Nonsmoker    PHYSICAL EXAM  GENERAL: Alert thin female adult  VITAL SIGNS: /66 mmHg  Pulse 70  Temp(Src) 36.9 °C (98.5 °F)  Resp 16  Ht 1.651 m (5' 5\")  Wt 70.308 kg (155 lb)  BMI 25.79 kg/m2  SpO2 100%  Constitutional: Alert healthy-appearing adult   HENT: Scalp is normal size and nontender. Ears are clear. Nose is clear. Throat is clear with no stridor no drooling no trismus. Teeth are all intact.  Eyes: Pupils equal round and reactive to light, extraocular motor fall. There is no scleral icterus.  Neck: Neck is supple and nontender. There is no meningismus. No adenitis. No thyromegaly.  Lymphatic: No adenopathy. "   Cardiovascular: Heart regular rhythm without murmurs or gallops   Thorax & Lungs: No chest wall tenderness. Lungs are clear. Patient has good breath sounds bilateral. No rales, no rhonchi, no wheezes.  Abdomen: Abdomen is soft, nontender, not rigid, no guarding, and no organomegaly. There is no palpable hernia   Skin: Warm, pink, and dry with no erythema and no rash.   Back: Nontender, no midline bony tenderness, no flank tenderness.                                             Extremities: Full range of motion  No tenderness to palpation and no deformities noted. No calf or thigh swelling. No calf or thigh tenderness. No clinical DVT.  Neurologic: Alert & oriented . Cranial nerves are grossly intact as tested. Patient moves all 4 extremities well. Patient has good strong flexion and extension of the ankle joints knee joints hip joints and elbow joints. Sensation is normal and symmetrical in the upper and lower extremities.   Psychiatric: Patient is alert oriented coherent and rational.     EKG  EKG Interpretation    Interpreted by me    Rhythm: normal sinus   Rate: normal  Axis: Left axis  Ectopy: none  Conduction: normal  ST Segments: no acute change  T Waves: no acute change  Q Waves: Lead 3    Clinical Impression: Sinus rhythm, left axis deviation, no specific ST-T wave change.    RADIOLOGY/PROCEDURES  CT-HEAD W/O   Final Result      1.  No acute intracranial process.      2.  Atrophy and small vessel ischemic change.      DX-CHEST-PORTABLE (1 VIEW)   Final Result      No acute cardiac or pulmonary abnormality is noted.            COURSE & MEDICAL DECISION MAKING  Elderly lady who arrives by ambulance for evaluation of sudden onset of lightheadedness and dizziness. Differential diagnosis: Dizziness, intracranial event, CNS event, cardiac event, dehydration, metabolic problem, etc. asymptomatic at this time. Vital signs normal.    Plan: #1 IV #2 cardiac monitor, pulse ox monitor, blood pressure monitor. #3.  Laboratory including CBC, CMP, troponin, chest x-ray, EKG, T4 and TSH, #4. Observation in the ED #5. Review of previous medical records    Review of previous medical records: Hypertension and thyroid disease. Medications including Norvasc and Apresoline and Synthroid.    Laboratory and reexamination: CT of the head is normal. Chest x-ray is normal. BNP 91. Troponin is 0.05.  Chemistry panel is normal except the potassium slightly low at 3.3. INR is normal. Hemoglobin 12 No anemia and no bandemia.   EKG is normal. Results for orders placed or performed during the hospital encounter of 08/02/17   CBC WITH DIFFERENTIAL   Result Value Ref Range    WBC 7.0 4.8 - 10.8 K/uL    RBC 3.90 (L) 4.20 - 5.40 M/uL    Hemoglobin 12.0 12.0 - 16.0 g/dL    Hematocrit 35.4 (L) 37.0 - 47.0 %    MCV 90.8 81.4 - 97.8 fL    MCH 30.8 27.0 - 33.0 pg    MCHC 33.9 33.6 - 35.0 g/dL    RDW 42.4 35.9 - 50.0 fL    Platelet Count 223 164 - 446 K/uL    MPV 8.9 (L) 9.0 - 12.9 fL    Neutrophils-Polys 71.20 44.00 - 72.00 %    Lymphocytes 20.40 (L) 22.00 - 41.00 %    Monocytes 7.10 0.00 - 13.40 %    Eosinophils 0.30 0.00 - 6.90 %    Basophils 0.60 0.00 - 1.80 %    Immature Granulocytes 0.40 0.00 - 0.90 %    Nucleated RBC 0.00 /100 WBC    Neutrophils (Absolute) 4.95 2.00 - 7.15 K/uL    Lymphs (Absolute) 1.42 1.00 - 4.80 K/uL    Monos (Absolute) 0.49 0.00 - 0.85 K/uL    Eos (Absolute) 0.02 0.00 - 0.51 K/uL    Baso (Absolute) 0.04 0.00 - 0.12 K/uL    Immature Granulocytes (abs) 0.03 0.00 - 0.11 K/uL    NRBC (Absolute) 0.00 K/uL   COMP METABOLIC PANEL   Result Value Ref Range    Sodium 138 135 - 145 mmol/L    Potassium 3.3 (L) 3.6 - 5.5 mmol/L    Chloride 107 96 - 112 mmol/L    Co2 23 20 - 33 mmol/L    Anion Gap 8.0 0.0 - 11.9    Glucose 98 65 - 99 mg/dL    Bun 19 8 - 22 mg/dL    Creatinine 0.90 0.50 - 1.40 mg/dL    Calcium 9.1 8.5 - 10.5 mg/dL    AST(SGOT) 15 12 - 45 U/L    ALT(SGPT) 15 2 - 50 U/L    Alkaline Phosphatase 54 30 - 99 U/L    Total Bilirubin  1.0 0.1 - 1.5 mg/dL    Albumin 3.7 3.2 - 4.9 g/dL    Total Protein 6.5 6.0 - 8.2 g/dL    Globulin 2.8 1.9 - 3.5 g/dL    A-G Ratio 1.3 g/dL   TROPONIN   Result Value Ref Range    Troponin I 0.05 (H) 0.00 - 0.04 ng/mL   PRTOTHROMBIN TIME (INR)   Result Value Ref Range    PT 14.4 12.0 - 14.6 sec    INR 1.09 0.87 - 1.13   BTYPE NATRIURETIC PEPTIDE   Result Value Ref Range    B Natriuretic Peptide 91 0 - 100 pg/mL   ESTIMATED GFR   Result Value Ref Range    GFR If African American >60 >60 mL/min/1.73 m 2    GFR If Non African American 60 >60 mL/min/1.73 m 2        The patient is asymptomatic and has been asymptomatic her entire visit here. Admission to the hospital has been declined. She would like to go home and she'll be staying with her daughter who is at the bedside. She been given a copy of all of her reports. She'll follow-up with her family physician. She'll return here as needed. Neurologically intact.  FINAL IMPRESSION  1. Dizziness  2. Stable hypertension         Electronically signed by: Gary Gansert, 8/2/2017/12:30 PM

## 2017-08-02 NOTE — ED NOTES
All lines and monitors d/c'd. Discharge paperwork and medications reviewed with pt and her daughter. Pt states she understands and had no questions. Pt encouraged to follow-up with PCP and come back to ER with worsening symptoms. Instructed not to drive after taking pain medication. Pt verbalizes understanding. Pt was wheeled out of ED in wheelchair with her daughter.

## 2017-08-02 NOTE — ED AVS SNAPSHOT
8/2/2017    Felipa Loyd  [address]  [place] [address]    Dear Felipa:    [facility] wants to ensure your discharge home is safe and you or your loved ones have had all of your questions answered regarding your care after you leave the hospital.    Below is a list of resources and contact information should you have any questions regarding your hospital stay, follow-up instructions, or active medical symptoms.    Questions or Concerns Regarding… Contact   Medical Questions Related to Your Discharge  (7 days a week, 8am-5pm) Contact a Nurse Care Coordinator   [phone]   Medical Questions Not Related to Your Discharge  (24 hours a day / 7 days a week)  Contact the Nurse Health Line   [phone]    Medications or Discharge Instructions Refer to your discharge packet   or contact your [facility] Primary Care Provider   [phone]   Follow-up Appointment(s) Schedule your appointment via [employer]   or contact Scheduling [phone]   Billing Review your statement via [employer]  or contact Billing [phone]   Medical Records Review your records via [employer]   or contact Medical Records [phone]     You may receive a telephone call within two days of discharge. This call is to make certain you understand your discharge instructions and have the opportunity to have any questions answered. You can also easily access your medical information, test results and upcoming appointments via the [employer] free online health management tool. You can learn more and sign up at [employer]. For assistance setting up your [employer] account, please call [phone].    Once again, we want to ensure your discharge home is safe and that you have a clear understanding of any next steps in your care. If you have any questions or concerns, please do not hesitate to contact us, we are here for you. Thank you for choosing [facility] for your healthcare needs.    Sincerely,    Your [facility] Healthcare Team

## 2017-08-02 NOTE — ED AVS SNAPSHOT
Home Care Instructions                                                                                                                Felipa Loyd   MRN: [id number]    Department:  [facility], Emergency Dept   Date of Visit:  8/2/2017            [facility], Emergency Dept    [address]    [facility] [address]    Phone:  [phone]      You were seen by     Gary Gansert, M.D.      Your Diagnosis Was     Dizziness     R42       These are the medications you received during your hospitalization from 08/02/2017 0931 to 08/02/2017 1220     Date/Time Order Dose Route Action    08/02/2017 1030 NS infusion 500 mL 500 mL Intravenous New Bag      Follow-up Information     1. Follow up with Kirill Friedman M.D.. Schedule an appointment as soon as possible for a visit in 1 day.    Specialty:  Internal Medicine    Contact information    [address] [facility] [address]  [facility] [address] [phone]        Medication Information     Review all of your home medications and newly ordered medications with your primary doctor and/or pharmacist as soon as possible. Follow medication instructions as directed by your doctor and/or pharmacist.     Please keep your complete medication list with you and share with your physician. Update the information when medications are discontinued, doses are changed, or new medications (including over-the-counter products) are added; and carry medication information at all times in the event of emergency situations.               Medication List      ASK your doctor about these medications        Instructions    Morning Afternoon Evening Bedtime    amlodipine 5 MG Tabs   Commonly known as:  NORVASC        Take 1 Tab by mouth 2 Times a Day.   Dose:  5 mg                        hydrALAZINE 25 MG Tabs   Commonly known as:  APRESOLINE        Take 1 Tab by mouth 2 Times a Day.   Dose:  25 mg                        levothyroxine 100 MCG Tabs   Commonly known as:   SYNTHROID        Take 1 Tab by mouth every morning before breakfast.   Dose:  100 mcg                        VITAMIN D-3 PO        Take 1 Cap by mouth every evening.   Dose:  1 Cap                                Procedures and tests performed during your visit     BTYPE NATRIURETIC PEPTIDE    CBC WITH DIFFERENTIAL    COMP METABOLIC PANEL    CT-HEAD W/O    Cardiac Monitoring    DX-CHEST-PORTABLE (1 VIEW)    EKG (ER)    EKG (NOW)    ESTIMATED GFR    IV Saline Lock    Oxygen    PRTOTHROMBIN TIME (INR)    Pulse Ox    TROPONIN            Patient Information     Patient Information    Following emergency treatment: all patient requiring follow-up care must return either to a private physician or a clinic if your condition worsens before you are able to obtain further medical attention, please return to the emergency room.     Billing Information    [facility] we work to make the billing process streamlined for our patients.  Our Representatives are here to answer any questions you may have regarding your hospital bill.  If you have insurance coverage and have supplied your insurance information to us, we will submit a claim to your insurer on your behalf.  Should you have any questions regarding your bill, we can be reached online or by phone as follows:  Online: You are able pay your bills online or live chat with our representatives about any billing questions you may have. We are here to help Monday - Friday from 8:00am to 7:30pm and 9:00am - 12:00pm on Saturdays.  Please visit [email or web address]  for more information.   Phone:  [phone] or [phone]    Please note that your emergency physician, surgeon, pathologist, radiologist, anesthesiologist, and other specialists are not employed by [facility] and will therefore bill separately for their services.  Please contact them directly for any questions concerning their bills at the numbers below:     Emergency Physician  Services:  [phone]  [facility]:  [phone]  [facility]:  [phone]  [facility]:  [phone]    1. Your final bill may vary from the amount quoted upon discharge if all procedures are not complete at that time, or if your doctor has additional procedures of which we are not aware. You will receive an additional bill if you return to the Emergency Department at [facility] for suture removal regardless of the facility of which the sutures were placed.     2. Please arrange for settlement of this account at the emergency registration.    3. All self-pay accounts are due in full at the time of treatment.  If you are unable to meet this obligation then payment is expected within 4-5 days.     4. If you have had radiology studies (CT, X-ray, Ultrasound, MRI), you have received a preliminary result during your emergency department visit. Please contact the radiology department [phone] to receive a copy of your final result. Please discuss the Final result with your primary physician or with the follow up physician provided.     Crisis Hotline:  [place] Crisis Hotline:  5-167-UMQPBCX or 1-314.902.5741  [place] Crisis Hotline:    1-598.632.6229 or 156-770-8500         ED Discharge Follow Up Questions    1. In order to provide you with very good care, we would like to follow up with a phone call in the next few days.  May we have your permission to contact you?     YES /  NO    2. What is the best phone number to call you? (       )_____-__________    3. What is the best time to call you?      Morning  /  Afternoon  /  Evening                   Patient Signature:  ____________________________________________________________    Date:  ____________________________________________________________      Your appointments     Aug 04, 2017  1:00 PM   Established Patient with Kirill Friedman M.D.   [place] [address]     [address]  [facility] [address]  [facility] [address]   [phone]           You will be receiving a confirmation call a few days before your appointment from our automated call confirmation system.            Sep 14, 2017  2:40 PM   ANNUAL WELLNESS with Kirill Friedman M.D., [facility]    [address]    [address] [facility] [address]  [facility] [address]   [phone]

## 2017-08-02 NOTE — ED NOTES
Tech from Lab called with critical result of Trop 0.05 at 1115. Critical lab result read back to Tech.   Dr. Gansert notified of critical lab result at 1117.  Critical lab result read back by Dr. Gansert.

## 2017-08-04 ENCOUNTER — OFFICE VISIT (OUTPATIENT)
Dept: MEDICAL GROUP | Facility: MEDICAL CENTER | Age: 82
End: 2017-08-04
Payer: MEDICARE

## 2017-08-04 VITALS
HEIGHT: 65 IN | TEMPERATURE: 98.4 F | SYSTOLIC BLOOD PRESSURE: 138 MMHG | DIASTOLIC BLOOD PRESSURE: 66 MMHG | RESPIRATION RATE: 16 BRPM | HEART RATE: 74 BPM | WEIGHT: 156.2 LBS | BODY MASS INDEX: 26.02 KG/M2 | OXYGEN SATURATION: 96 %

## 2017-08-04 DIAGNOSIS — M79.605 PAIN OF LEFT LOWER EXTREMITY: ICD-10-CM

## 2017-08-04 DIAGNOSIS — I10 ESSENTIAL HYPERTENSION: ICD-10-CM

## 2017-08-04 PROCEDURE — 99214 OFFICE O/P EST MOD 30 MIN: CPT | Performed by: INTERNAL MEDICINE

## 2017-08-04 NOTE — PROGRESS NOTES
"CC: Hospital follow-up    HPI:   Felipa presents today with the following.    1. Pain of left lower extremity  Presents after initially going to the hospital with left leg pain after injection the head. She reports pain was 10 out of 10. She does have extensive bruising over the left leg. She also had an episode of dizziness while in the hospital was worked up and no major findings. Her pain is significantly improved post injection although there is still bruising it is going down in nature. She denies any swelling in the lower extremity and has no shortness of breath or chest pain.    2. Essential hypertension  [facility] she was given IV hydralazine because of high blood pressures while she was in pain. Seemed to respond better to morphine and anything else. She denies any chest pain or shortness of breath no edema.      Patient Active Problem List    Diagnosis Date Noted   • Essential hypertension 04/18/2015     Priority: High   • Dyslipidemia 01/06/2015     Priority: Medium   • Acquired hypothyroidism 12/20/2014     Priority: Medium   • Vitamin D deficiency 03/11/2016       Current Outpatient Prescriptions   Medication Sig Dispense Refill   • amlodipine (NORVASC) 5 MG Tab Take 1 Tab by mouth 2 Times a Day. 180 Tab 4   • hydrALAZINE (APRESOLINE) 25 MG Tab Take 1 Tab by mouth 2 Times a Day. 180 Tab 4   • levothyroxine (SYNTHROID) 100 MCG Tab Take 1 Tab by mouth every morning before breakfast. 90 Tab 3   • Cholecalciferol (VITAMIN D-3 PO) Take 1 Cap by mouth every evening.       No current facility-administered medications for this visit.         Allergies as of 08/04/2017 - Matthew as Reviewed 08/04/2017   Allergen Reaction Noted   • Lisinopril Diarrhea, Nausea, and Cough 07/27/2017   • Losartan Nausea and Cough 07/27/2017   • Pcn [penicillins] Hives 07/27/2017        ROS: As per HPI.    /66 mmHg  Pulse 74  Temp(Src) 36.9 °C (98.4 °F)  Resp 16  Ht 1.651 m (5' 5\")  Wt 70.852 kg (156 lb 3.2 oz)  BMI " 25.99 kg/m2  SpO2 96%    Physical Exam:  Gen:         Alert and oriented, No apparent distress.  Neck:        No Lymphadenopathy or Bruits.  Lungs:     Clear to auscultation bilaterally  CV:          Regular rate and rhythm. No murmurs, rubs or gallops.               Ext:          No clubbing, cyanosis, edema.      Assessment and Plan.   84 y.o. female with the following issues.    1. Pain of left lower extremity  C extensive ecchymosis over the left thigh however no signs of hematoma and reporting improving symptoms. She will follow up with orthopedics    2. Essential hypertension  Blood pressure well controlled to monitor for the next month.

## 2017-08-04 NOTE — MR AVS SNAPSHOT
"        Felipa HYATT Rach   2017 1:00 PM   Office Visit   MRN: [id number]    Department:  [address] [place] [address]   Dept Phone:  [phone]    Description:  Female : 1932   Provider:  Kirill Friedman M.D.           Reason for Visit     Hospital Follow-up           Allergies as of 2017     Allergen Noted Reactions    Lisinopril 2017   Diarrhea, Nausea, Cough    GI problems, lost control    Losartan 2017   Nausea, Cough    Pcn [Penicillins] 2017   Hives    Reaction in 1967      You were diagnosed with     Medicare annual wellness visit, subsequent   [525659]       Essential hypertension   [4040367]       Dyslipidemia   [850045]       Acquired hypothyroidism   [9209875]       Vitamin D deficiency   [1386701]       Pain of left lower extremity   [3514002]         Vital Signs     Blood Pressure Pulse Temperature Respirations Height Weight    138/66 mmHg 74 36.9 °C (98.4 °F) 16 1.651 m (5' 5\") 70.852 kg (156 lb 3.2 oz)    Body Mass Index Oxygen Saturation Smoking Status             25.99 kg/m2 96% Former Smoker         Basic Information     Date Of Birth Sex Race Ethnicity Preferred Language    1932 Female White Non- English      Your appointments     Sep 14, 2017  2:40 PM   ANNUAL WELLNESS with Kirill Friedman M.D., [facility]    [facility] [address]    [address] [place] [address] [address]  [facility] [address]   [phone]              Problem List              ICD-10-CM Priority Class Noted - Resolved    Acquired hypothyroidism E03.9 Medium  2014 - Present    Dyslipidemia E78.5 Medium  2015 - Present    Essential hypertension I10 High  2015 - Present    Vitamin D deficiency E55.9   3/11/2016 - Present      Health Maintenance        Date Due Completion Dates    IMM DTaP/Tdap/Td Vaccine (1 - Tdap) 1951 ---    IMM ZOSTER VACCINE 1992 ---    IMM INFLUENZA (1) 2017    BONE DENSITY 2021            Current " Immunizations     13-VALENT PCV PREVNAR 8/25/2016    Influenza Vaccine Adult HD 9/19/2016    Pneumococcal polysaccharide vaccine (PPSV-23) 12/20/2014  8:58 AM      Below and/or attached are the medications your provider expects you to take. Review all of your home medications and newly ordered medications with your provider and/or pharmacist. Follow medication instructions as directed by your provider and/or pharmacist. Please keep your medication list with you and share with your provider. Update the information when medications are discontinued, doses are changed, or new medications (including over-the-counter products) are added; and carry medication information at all times in the event of emergency situations     Allergies:  LISINOPRIL - Diarrhea,Nausea,Cough     LOSARTAN - Nausea,Cough     PCN - Hives               Medications  Valid as of: August 04, 2017 -  1:56 PM    Generic Name Brand Name Tablet Size Instructions for use    AmLODIPine Besylate (Tab) NORVASC 5 MG Take 1 Tab by mouth 2 Times a Day.        Cholecalciferol   Take 1 Cap by mouth every evening.        HydrALAZINE HCl (Tab) APRESOLINE 25 MG Take 1 Tab by mouth 2 Times a Day.        Levothyroxine Sodium (Tab) SYNTHROID 100 MCG Take 1 Tab by mouth every morning before breakfast.        .                 Medicines prescribed today were sent to:     [facility] #[id number] - [place], [place] - [address] [place] [address]    Phone: [phone] Fax: [phone]    Open 24 Hours?: No      Medication refill instructions:       If your prescription bottle indicates you have medication refills left, it is not necessary to call your provider’s office. Please contact your pharmacy and they will refill your medication.    If your prescription bottle indicates you do not have any refills left, you may request refills at any time through one of the following ways: The online [employer] system (except Urgent Care),  by calling your provider’s office, or by asking your pharmacy to contact your provider’s office with a refill request. Medication refills are processed only during regular business hours and may not be available until the next business day. Your provider may request additional information or to have a follow-up visit with you prior to refilling your medication.   *Please Note: Medication refills are assigned a new Rx number when refilled electronically. Your pharmacy may indicate that no refills were authorized even though a new prescription for the same medication is available at the pharmacy. Please request the medicine by name with the pharmacy before contacting your provider for a refill.        Other Notes About Your Plan     No evidence  Of CHF , reviewed            MyChart Access Code: Activation code not generated  Current [employer] Status: Active

## 2017-08-31 ENCOUNTER — HOSPITAL ENCOUNTER (OUTPATIENT)
Dept: LAB | Facility: MEDICAL CENTER | Age: 82
End: 2017-08-31
Attending: ORTHOPAEDIC SURGERY
Payer: MEDICARE

## 2017-08-31 LAB
APTT PPP: 38.8 SEC (ref 24.7–36)
INR PPP: 0.95 (ref 0.87–1.13)
PROTHROMBIN TIME: 13 SEC (ref 12–14.6)

## 2017-08-31 PROCEDURE — 85610 PROTHROMBIN TIME: CPT

## 2017-08-31 PROCEDURE — 85730 THROMBOPLASTIN TIME PARTIAL: CPT

## 2017-08-31 PROCEDURE — 36415 COLL VENOUS BLD VENIPUNCTURE: CPT

## 2017-09-11 DIAGNOSIS — I10 ESSENTIAL HYPERTENSION: ICD-10-CM

## 2017-09-11 RX ORDER — HYDRALAZINE HYDROCHLORIDE 25 MG/1
25 TABLET, FILM COATED ORAL 2 TIMES DAILY
Qty: 180 TAB | Refills: 3 | Status: SHIPPED | OUTPATIENT
Start: 2017-09-11 | End: 2017-09-26

## 2017-09-11 RX ORDER — AMLODIPINE BESYLATE 5 MG/1
5 TABLET ORAL 2 TIMES DAILY
Qty: 180 TAB | Refills: 3 | Status: SHIPPED | OUTPATIENT
Start: 2017-09-11 | End: 2019-08-09

## 2017-09-12 ENCOUNTER — TELEPHONE (OUTPATIENT)
Dept: MEDICAL GROUP | Facility: MEDICAL CENTER | Age: 82
End: 2017-09-12

## 2017-09-12 NOTE — LETTER
Request for Medical Records    Patient Name: Felipa Loyd    : 1932      Dear Doctor: Adalberto Burton,     The above named patient receives primary care at the [facility] by Kirill Friedman M.D..  The patient informs us that you are her eye care Provider.    Please fax a copy of the most recent eye exam to [phone] or answer the  questions below and fax this sheet back to us at the above number.  Attached is a signed Release of Information.      Date of last eye exam: _____________    Retinal eye exam summary:        Please select the choice(s) that apply.    ____ No diabetic retinopathy    ____    Diabetic retinopathy present      Printed Name and Credentials: __________________________________    Signature of Eye Care Provider: _________________________________    We appreciate your assistance and collaboration in providing efficient patient care!    Kindest Regards,    [facility] [address]  [address] [facility]  [place] [address] [phone]

## 2017-09-12 NOTE — TELEPHONE ENCOUNTER
Future Appointments       Provider Department Center    9/14/2017 2:40 PM Kirill Friedman M.D.; [address] [place] [facility]          ANNUAL WELLNESS VISIT PRE-VISIT PLANNING     1.  Reviewed note from last office visit with PCP: YES Last office visit: 08/04/17    2.  If any orders were placed at last visit, do we have Results/Consult Notes?        •  Labs - Labs ordered, completed and results are in chart. 03/31/17       •  Imaging - Imaging was not ordered at last office visit.        •  Referrals - No referrals were ordered at last office visit.     3.  Immunizations were updated in [employer] using Web[employer]?: Yes       •  [employer] Recommendations:TDAP, FLU and ZOSTAVAX (Shingles)       •  Is patient due for Tdap? YES. Patient was notified of copay.       •  Is patient due for Shingles? YES. Patient was notified of copay.     4.  Patient is due for the following Health Maintenance Topics:   Health Maintenance Due   Topic Date Due   • Annual Wellness Visit  SCHEDULED FOR 09/14/17   • IMM ZOSTER VACCINE  09/08/1992   • IMM DTaP/Tdap/Td Vaccine (1 - Tdap)  08/26/2017   • IMM INFLUENZA (1) 09/01/2017       5.  Reviewed/Updated the following with patient:       •   Preferred Pharmacy? YES       •   Preferred Lab? YES       •   Medications? YES. Was Abstract Encounter opened and chart updated? YES       •   Social History? YES. Was Abstract Encounter opened and chart updated? YES       •   Family History? YES. Was Abstract Encounter opened and chart updated? YES    6.  Care Team Updated:       •   DME Company (gait device, O2, CPAP, etc.): YES       •   Other Specialists (eye doctor, derm, GYN, cardiology, endo, etc): YES       •   Last eye exam: UNKNOWN    7. DPA/Advanced Directive:  Patient has Advanced Directive on file.     8.  Patient has the following Care Path diagnoses on Problem List:  NONE    9.  Specialty Comments was updated with diagnosis information provided by [facility]: YES NO  NOTE    10.  Patient was advised: “This is a free wellness visit. The provider will screen for medical conditions to help you stay healthy. If you have other concerns to address you may be asked to discuss these at a separate visit or there may be an additional fee.”     11.  Patient was informed to arrive 15 min prior to their scheduled appointment and bring in their medication bottles?  YES

## 2017-09-12 NOTE — LETTER
[employer]  Kirill Friedman M.D.  [address]  [place] [address]  Fax: [phone]   Authorization for Release/Disclosure of   Protected Health Information   Name: FELIPA THORPE : 1932 SSN: [id number]   Address: [address] [place] [address] [address]  [facility] [address] Phone:    [phone] (home)    I authorize the entity listed below to release/disclose the PHI below to:   [facility] [employer]/Kirill Friedman M.D. and Kirill Friedman M.D.   Provider or Entity Name: Adalberto Burton M.D.   Address   [facility], [facility], [facility]   Phone:      Fax: [phone]   Reason for request: continuity of care   Information to be released:    [  ] LAST COLONOSCOPY,  including any PATH REPORT and follow-up  [  ] LAST FIT/COLOGUARD RESULT [  ] LAST DEXA  [  ] LAST MAMMOGRAM  [  ] LAST PAP  [  ] LAST LABS [X] RETINA EXAM REPORT  [  ] IMMUNIZATION RECORDS  [  ] Release all info      [  ] Check here and initial the line next to each item to release ALL health information INCLUDING  _____ Care and treatment for drug and / or alcohol abuse  _____ HIV testing, infection status, or AIDS  _____ Genetic Testing    DATES OF SERVICE OR TIME PERIOD TO BE DISCLOSED: _____________  I understand and acknowledge that:  * This Authorization may be revoked at any time by you in writing, except if your health information has already been used or disclosed.  * Your health information that will be used or disclosed as a result of you signing this authorization could be re-disclosed by the recipient. If this occurs, your re-disclosed health information may no longer be protected by State or Federal laws.  * You may refuse to sign this Authorization. Your refusal will not affect your ability to obtain treatment.  * This Authorization becomes effective upon signing and will  on (date) __________.      If no date is indicated, this Authorization will  one (1) year from the signature date.    Name: Felipa Thorpe    Signature:        Date: 9/14/2018       PLEASE FAX REQUESTED RECORDS BACK TO: [phone]

## 2017-09-14 ENCOUNTER — APPOINTMENT (OUTPATIENT)
Dept: MEDICAL GROUP | Facility: MEDICAL CENTER | Age: 82
End: 2017-09-14
Payer: MEDICARE

## 2017-09-26 ENCOUNTER — OFFICE VISIT (OUTPATIENT)
Dept: MEDICAL GROUP | Facility: MEDICAL CENTER | Age: 82
End: 2017-09-26
Payer: MEDICARE

## 2017-09-26 VITALS
DIASTOLIC BLOOD PRESSURE: 70 MMHG | BODY MASS INDEX: 26.29 KG/M2 | OXYGEN SATURATION: 97 % | SYSTOLIC BLOOD PRESSURE: 174 MMHG | RESPIRATION RATE: 16 BRPM | HEART RATE: 64 BPM | HEIGHT: 64 IN | WEIGHT: 154 LBS | TEMPERATURE: 98.3 F

## 2017-09-26 DIAGNOSIS — E78.5 DYSLIPIDEMIA: ICD-10-CM

## 2017-09-26 DIAGNOSIS — Z00.00 MEDICARE ANNUAL WELLNESS VISIT, SUBSEQUENT: ICD-10-CM

## 2017-09-26 DIAGNOSIS — R42 VERTIGO: ICD-10-CM

## 2017-09-26 DIAGNOSIS — I10 ESSENTIAL HYPERTENSION: ICD-10-CM

## 2017-09-26 DIAGNOSIS — E03.9 ACQUIRED HYPOTHYROIDISM: ICD-10-CM

## 2017-09-26 DIAGNOSIS — H91.90 HEARING LOSS, UNSPECIFIED HEARING LOSS TYPE, UNSPECIFIED LATERALITY: ICD-10-CM

## 2017-09-26 PROCEDURE — G0439 PPPS, SUBSEQ VISIT: HCPCS | Performed by: INTERNAL MEDICINE

## 2017-09-26 PROCEDURE — 99213 OFFICE O/P EST LOW 20 MIN: CPT | Mod: 25 | Performed by: INTERNAL MEDICINE

## 2017-09-26 RX ORDER — HYDRALAZINE HYDROCHLORIDE 25 MG/1
25 TABLET, FILM COATED ORAL 3 TIMES DAILY
Qty: 90 TAB | Refills: 3
Start: 2017-09-26 | End: 2018-03-22

## 2017-09-26 ASSESSMENT — PATIENT HEALTH QUESTIONNAIRE - PHQ9
CLINICAL INTERPRETATION OF PHQ2 SCORE: 1
SUM OF ALL RESPONSES TO PHQ QUESTIONS 1-9: 3
5. POOR APPETITE OR OVEREATING: 0 - NOT AT ALL

## 2017-09-26 ASSESSMENT — PAIN SCALES - GENERAL: PAINLEVEL: 5=MODERATE PAIN

## 2017-09-26 NOTE — PROGRESS NOTES
CC: Wellness exam with elevated blood pressure and symptoms of vertigo.    HPI:   Felipa presents today with the following.    1. Medicare annual wellness visit, subsequent  Screenings performed below.    2. Essential hypertension  Blood pressure significantly elevated today in office she does report high blood pressures at home as well. She has no chest pain or shortness of breath no edema. She is compliant with her medications. Spent intolerant to multiple medications. She does tolerate current dosages well.    3. Vertigo  She is complaining the spinning sensation particularly with rolling over in bed. She reports she's had similar symptoms in the past and been diagnosed with vertigo.    4. Acquired hypothyroidism  Maintain on thyroid medication denying any hair or skin changes.    5. Dyslipidemia  History of elevated cholesterol currently not on a statin.    6. Hearing loss, unspecified hearing loss type, unspecified laterality  She does report problems with her hearing aid but is not ready to see an oncologist.      Depression Screening    Little interest or pleasure in doing things?  0 - not at all  Feeling down, depressed , or hopeless? 1 - several days  Trouble falling or staying asleep, or sleeping too much?  1 - several days  Feeling tired or having little energy?  1 - several days  Poor appetite or overeating?  0 - not at all  Feeling bad about yourself - or that you are a failure or have let yourself or your family down? 0 - not at all  Trouble concentrating on things, such as reading the newspaper or watching television? 0 - not at all  Moving or speaking so slowly that other people could have noticed.  Or the opposite - being so fidgety or restless that you have been moving around a lot more than usual?  0 - not at all  Thoughts that you would be better off dead, or of hurting yourself?  0 - not at all  Patient Health Questionnaire Score: 3    If depressive symptoms identified deferred to follow up  visit unless specifically addressed in assessment and plan.    Interpretation of PHQ-9 Total Score   Score Severity   1-4 No Depression   5-9 Mild Depression   10-14 Moderate Depression   15-19 Moderately Severe Depression   20-27 Severe Depression      Screening for Cognitive Impairment    Three Minute Recall (apple, watch, nils)  2/3    Draw clock face with all 12 numbers set to the hand to show 10 minutes past 11 o'clock  1 4/5  Cognitive concerns identified deferred for follow up unless specifically addressed in assessment and plan.    Fall Risk Assessment    Has the patient had two or more falls in the last year or any fall with injury in the last year?  No    Safety Assessment    Throw rugs on floor.  No  Handrails on all stairs.  Yes  Good lighting in all hallways.  Yes  Difficulty hearing.  No  Patient counseled about all safety risks that were identified.    Functional Assessment ADLs    Are there any barriers preventing you from cooking for yourself or meeting nutritional needs?  No.    Are there any barriers preventing you from driving safely or obtaining transportation?  No.    Are there any barriers preventing you from using a telephone or calling for help?  No.    Are there any barriers preventing you from shopping?  No.    Are there any barriers preventing you from taking care of your own finances?  No.    Are there any barriers preventing you from managing your medications?  No.    Are currently engaging any exercise or physical activity?  Yes.       Health Maintenance Summary                IMM DTaP/Tdap/Td Vaccine Overdue 9/8/1951     Annual Wellness Visit Overdue 8/26/2017      Done 8/25/2016 Visit Dx: Medicare annual wellness visit, subsequent    IMM INFLUENZA Overdue 9/1/2017      Done 9/19/2016 Imm Admin: Influenza Vaccine Adult HD    BONE DENSITY Next Due 9/27/2021      Done 9/27/2016 DS-BONE DENSITY STUDY (DEXA)          Patient Care Team:  Kirill Friedman M.D. as PCP - General (Internal  "Medicine)  Jerrod Johnson M.D. as Consulting Physician (Otolaryngology)  Adalberto Burton M.D. as Consulting Physician (Ophthalmology)      Patient Active Problem List    Diagnosis Date Noted   • Essential hypertension 04/18/2015     Priority: High   • Dyslipidemia 01/06/2015     Priority: Medium   • Acquired hypothyroidism 12/20/2014     Priority: Medium   • Vitamin D deficiency 03/11/2016       Current Outpatient Prescriptions   Medication Sig Dispense Refill   • hydrALAZINE (APRESOLINE) 25 MG Tab Take 1 Tab by mouth 3 times a day. 90 Tab 3   • levothyroxine (SYNTHROID) 100 MCG Tab TAKE 1 TAB BY MOUTH EVERY MORNING BEFORE BREAKFAST. 90 Tab 3   • amlodipine (NORVASC) 5 MG Tab TAKE 1 TAB BY MOUTH 2 TIMES A DAY. 180 Tab 3   • Cholecalciferol (VITAMIN D-3 PO) Take 1 Cap by mouth every evening.       No current facility-administered medications for this visit.          Allergies as of 09/26/2017 - Reviewed 09/26/2017   Allergen Reaction Noted   • Lisinopril Diarrhea, Nausea, and Cough 07/27/2017   • Losartan Nausea and Cough 07/27/2017   • Pcn [penicillins] Hives 07/27/2017        ROS: As per HPI.    BP (!) 174/70   Pulse 64   Temp 36.8 °C (98.3 °F)   Resp 16   Ht 1.626 m (5' 4\")   Wt 69.9 kg (154 lb)   SpO2 97%   BMI 26.43 kg/m²     Physical Exam:  Gen:         Alert and oriented, No apparent distress.  Neck:        No Lymphadenopathy or Bruits.  Lungs:     Clear to auscultation bilaterally  CV:          Regular rate and rhythm. No murmurs, rubs or gallops.  Abd:         Soft non tender, non distended. Normal active bowel sounds.  No  Hepatosplenomegaly, No pulsatile masses.                   Ext:          No clubbing, cyanosis, edema.      Assessment and Plan.   85 y.o. female with the following issues.    1. Medicare annual wellness visit, subsequent  Discussed healthy lifestyle habits as well as screening regimens.  - Annual Wellness Visit - Includes PPPS Subsequent ()    2. Essential " hypertension  Increasing hydralazine to 3 times a day cautioned about side effects. Back in one month.    3. Vertigo  Have place referral to vestibular rehabilitation follow up if not improving.  - REFERRAL TO PHYSICAL THERAPY Reason for Therapy: Eval/Treat/Report    4. Acquired hypothyroidism  Currently adequately replaced, recheck 6 months to one year.  - Annual Wellness Visit - Includes PPPS Subsequent ()    5. Dyslipidemia  Discussed diet exercise  - Annual Wellness Visit - Includes PPPS Subsequent ()    6. Hearing loss, unspecified hearing loss type, unspecified laterality  She will be referred to audiology when she is ready.  - Annual Wellness Visit - Includes PPPS Subsequent ()

## 2017-10-02 ENCOUNTER — APPOINTMENT (OUTPATIENT)
Dept: MEDICAL GROUP | Facility: MEDICAL CENTER | Age: 82
End: 2017-10-02
Payer: MEDICARE

## 2018-02-09 ENCOUNTER — HOSPITAL ENCOUNTER (OUTPATIENT)
Dept: LAB | Facility: MEDICAL CENTER | Age: 83
End: 2018-02-09
Attending: INTERNAL MEDICINE
Payer: MEDICARE

## 2018-02-09 ENCOUNTER — OFFICE VISIT (OUTPATIENT)
Dept: MEDICAL GROUP | Facility: MEDICAL CENTER | Age: 83
End: 2018-02-09
Payer: MEDICARE

## 2018-02-09 VITALS
DIASTOLIC BLOOD PRESSURE: 64 MMHG | SYSTOLIC BLOOD PRESSURE: 152 MMHG | HEIGHT: 64 IN | TEMPERATURE: 98.9 F | OXYGEN SATURATION: 96 % | WEIGHT: 152 LBS | RESPIRATION RATE: 16 BRPM | BODY MASS INDEX: 25.95 KG/M2 | HEART RATE: 66 BPM

## 2018-02-09 DIAGNOSIS — E03.9 ACQUIRED HYPOTHYROIDISM: ICD-10-CM

## 2018-02-09 DIAGNOSIS — M79.10 MYALGIA: ICD-10-CM

## 2018-02-09 DIAGNOSIS — I10 ESSENTIAL HYPERTENSION: ICD-10-CM

## 2018-02-09 LAB
ALBUMIN SERPL BCP-MCNC: 3.9 G/DL (ref 3.2–4.9)
ALBUMIN/GLOB SERPL: 1.1 G/DL
ALP SERPL-CCNC: 45 U/L (ref 30–99)
ALT SERPL-CCNC: 11 U/L (ref 2–50)
ANION GAP SERPL CALC-SCNC: 9 MMOL/L (ref 0–11.9)
APPEARANCE UR: CLEAR
AST SERPL-CCNC: 15 U/L (ref 12–45)
BACTERIA #/AREA URNS HPF: NEGATIVE /HPF
BASOPHILS # BLD AUTO: 0.7 % (ref 0–1.8)
BASOPHILS # BLD: 0.05 K/UL (ref 0–0.12)
BILIRUB SERPL-MCNC: 0.5 MG/DL (ref 0.1–1.5)
BILIRUB UR QL STRIP.AUTO: NEGATIVE
BUN SERPL-MCNC: 21 MG/DL (ref 8–22)
CALCIUM SERPL-MCNC: 9.1 MG/DL (ref 8.5–10.5)
CHLORIDE SERPL-SCNC: 110 MMOL/L (ref 96–112)
CO2 SERPL-SCNC: 23 MMOL/L (ref 20–33)
COLOR UR: YELLOW
CREAT SERPL-MCNC: 0.9 MG/DL (ref 0.5–1.4)
CULTURE IF INDICATED INDCX: NO UA CULTURE
EOSINOPHIL # BLD AUTO: 0.02 K/UL (ref 0–0.51)
EOSINOPHIL NFR BLD: 0.3 % (ref 0–6.9)
EPI CELLS #/AREA URNS HPF: ABNORMAL /HPF
ERYTHROCYTE [DISTWIDTH] IN BLOOD BY AUTOMATED COUNT: 42.2 FL (ref 35.9–50)
ERYTHROCYTE [SEDIMENTATION RATE] IN BLOOD BY WESTERGREN METHOD: 44 MM/HOUR (ref 0–30)
GLOBULIN SER CALC-MCNC: 3.5 G/DL (ref 1.9–3.5)
GLUCOSE SERPL-MCNC: 102 MG/DL (ref 65–99)
GLUCOSE UR STRIP.AUTO-MCNC: NEGATIVE MG/DL
HCT VFR BLD AUTO: 41.9 % (ref 37–47)
HGB BLD-MCNC: 13.6 G/DL (ref 12–16)
HYALINE CASTS #/AREA URNS LPF: ABNORMAL /LPF
IMM GRANULOCYTES # BLD AUTO: 0.02 K/UL (ref 0–0.11)
IMM GRANULOCYTES NFR BLD AUTO: 0.3 % (ref 0–0.9)
KETONES UR STRIP.AUTO-MCNC: NEGATIVE MG/DL
LEUKOCYTE ESTERASE UR QL STRIP.AUTO: NEGATIVE
LYMPHOCYTES # BLD AUTO: 1.94 K/UL (ref 1–4.8)
LYMPHOCYTES NFR BLD: 28.7 % (ref 22–41)
MCH RBC QN AUTO: 29.9 PG (ref 27–33)
MCHC RBC AUTO-ENTMCNC: 32.5 G/DL (ref 33.6–35)
MCV RBC AUTO: 92.1 FL (ref 81.4–97.8)
MICRO URNS: ABNORMAL
MONOCYTES # BLD AUTO: 0.55 K/UL (ref 0–0.85)
MONOCYTES NFR BLD AUTO: 8.1 % (ref 0–13.4)
NEUTROPHILS # BLD AUTO: 4.19 K/UL (ref 2–7.15)
NEUTROPHILS NFR BLD: 61.9 % (ref 44–72)
NITRITE UR QL STRIP.AUTO: NEGATIVE
NRBC # BLD AUTO: 0 K/UL
NRBC BLD-RTO: 0 /100 WBC
PH UR STRIP.AUTO: 6 [PH]
PLATELET # BLD AUTO: 284 K/UL (ref 164–446)
PMV BLD AUTO: 8.9 FL (ref 9–12.9)
POTASSIUM SERPL-SCNC: 3.6 MMOL/L (ref 3.6–5.5)
PROT SERPL-MCNC: 7.4 G/DL (ref 6–8.2)
PROT UR QL STRIP: 100 MG/DL
RBC # BLD AUTO: 4.55 M/UL (ref 4.2–5.4)
RBC # URNS HPF: ABNORMAL /HPF
RBC UR QL AUTO: NEGATIVE
SODIUM SERPL-SCNC: 142 MMOL/L (ref 135–145)
SP GR UR STRIP.AUTO: 1.03
TSH SERPL DL<=0.005 MIU/L-ACNC: 1.4 UIU/ML (ref 0.38–5.33)
UROBILINOGEN UR STRIP.AUTO-MCNC: 0.2 MG/DL
WBC # BLD AUTO: 6.8 K/UL (ref 4.8–10.8)
WBC #/AREA URNS HPF: ABNORMAL /HPF

## 2018-02-09 PROCEDURE — 80053 COMPREHEN METABOLIC PANEL: CPT

## 2018-02-09 PROCEDURE — 85652 RBC SED RATE AUTOMATED: CPT

## 2018-02-09 PROCEDURE — 36415 COLL VENOUS BLD VENIPUNCTURE: CPT

## 2018-02-09 PROCEDURE — 81001 URINALYSIS AUTO W/SCOPE: CPT

## 2018-02-09 PROCEDURE — 82085 ASSAY OF ALDOLASE: CPT

## 2018-02-09 PROCEDURE — 99214 OFFICE O/P EST MOD 30 MIN: CPT | Performed by: INTERNAL MEDICINE

## 2018-02-09 PROCEDURE — 85025 COMPLETE CBC W/AUTO DIFF WBC: CPT

## 2018-02-09 PROCEDURE — 84443 ASSAY THYROID STIM HORMONE: CPT

## 2018-02-09 RX ORDER — ACETAMINOPHEN 500 MG
500-1000 TABLET ORAL EVERY 6 HOURS PRN
COMMUNITY
End: 2018-06-11

## 2018-02-09 RX ORDER — METHYLPREDNISOLONE 4 MG/1
TABLET ORAL
Qty: 21 TAB | Refills: 0 | Status: SHIPPED | OUTPATIENT
Start: 2018-02-09 | End: 2018-03-06

## 2018-02-09 NOTE — PROGRESS NOTES
"CC: Myalgias    HPI:   Felipa presents today with the following.    1. Myalgia  Presents complaining of sudden onset of myalgias approximate 2 weeks ago. She reports suspecting her legs and shoulders. She also reports some weakness. She is also having bilateral hand pain. She denies rashes has no chest pain or shortness of breath and no other complaints.    2. Acquired hypothyroidism  Maintained on thyroid medications coming due for recheck    3. Essential hypertension  Blood pressure slightly elevated today. Denying again any chest pain or shortness of breath no edema      Patient Active Problem List    Diagnosis Date Noted   • Essential hypertension 04/18/2015     Priority: High   • Dyslipidemia 01/06/2015     Priority: Medium   • Acquired hypothyroidism 12/20/2014     Priority: Medium   • Vitamin D deficiency 03/11/2016       Current Outpatient Prescriptions   Medication Sig Dispense Refill   • acetaminophen (TYLENOL) 500 MG Tab Take 500-1,000 mg by mouth every 6 hours as needed.     • MethylPREDNISolone (MEDROL DOSEPAK) 4 MG Tablet Therapy Pack Per package insert. 21 Tab 0   • hydrALAZINE (APRESOLINE) 25 MG Tab Take 1 Tab by mouth 3 times a day. 90 Tab 3   • levothyroxine (SYNTHROID) 100 MCG Tab TAKE 1 TAB BY MOUTH EVERY MORNING BEFORE BREAKFAST. 90 Tab 3   • amlodipine (NORVASC) 5 MG Tab TAKE 1 TAB BY MOUTH 2 TIMES A DAY. 180 Tab 3   • Cholecalciferol (VITAMIN D-3 PO) Take 1 Cap by mouth every evening.       No current facility-administered medications for this visit.          Allergies as of 02/09/2018 - Reviewed 02/09/2018   Allergen Reaction Noted   • Lisinopril Diarrhea, Nausea, and Cough 07/27/2017   • Losartan Nausea and Cough 07/27/2017   • Pcn [penicillins] Hives 07/27/2017        ROS: Denies Chest pain, SOB, LE edema.    /64   Pulse 66   Temp 37.2 °C (98.9 °F)   Resp 16   Ht 1.626 m (5' 4\")   Wt 68.9 kg (152 lb)   SpO2 96%   BMI 26.09 kg/m²      Physical Exam:  Gen:         Alert and " oriented, No apparent distress.  Neck:        No Lymphadenopathy or Bruits.  Lungs:     Clear to auscultation bilaterally  CV:          Regular rate and rhythm. No murmurs, rubs or gallops.               Ext:          No clubbing, cyanosis, edema.      Assessment and Plan.   85 y.o. female with the following issues.    1. Myalgia  Certainly reminiscent of possible PMR denying any blurred vision or headaches. Have sent for blood work putting on steroids for the next 5 days will see back in 2 weeks.  - WESTERGREN SED RATE; Future  - COMP METABOLIC PANEL; Future  - CBC WITH DIFFERENTIAL; Future  - MethylPREDNISolone (MEDROL DOSEPAK) 4 MG Tablet Therapy Pack; Per package insert.  Dispense: 21 Tab; Refill: 0  - ALDOLASE; Future  - URINALYSIS,CULTURE IF INDICATED; Future    2. Acquired hypothyroidism  Recheck blood work.  - TSH; Future    3. Essential hypertension  Currently well controlled, Discuss diet, exercise and salt restriction.  No change to medication therapy.

## 2018-02-10 LAB — ALDOLASE SERPL-CCNC: 2.2 U/L (ref 1.5–8.1)

## 2018-02-23 ENCOUNTER — OFFICE VISIT (OUTPATIENT)
Dept: MEDICAL GROUP | Facility: MEDICAL CENTER | Age: 83
End: 2018-02-23
Payer: MEDICARE

## 2018-02-23 ENCOUNTER — HOSPITAL ENCOUNTER (OUTPATIENT)
Dept: LAB | Facility: MEDICAL CENTER | Age: 83
End: 2018-02-23
Attending: INTERNAL MEDICINE
Payer: MEDICARE

## 2018-02-23 ENCOUNTER — HOSPITAL ENCOUNTER (OUTPATIENT)
Dept: RADIOLOGY | Facility: MEDICAL CENTER | Age: 83
End: 2018-02-23
Attending: INTERNAL MEDICINE
Payer: MEDICARE

## 2018-02-23 VITALS
HEART RATE: 62 BPM | BODY MASS INDEX: 25.61 KG/M2 | HEIGHT: 64 IN | TEMPERATURE: 97.9 F | WEIGHT: 150 LBS | DIASTOLIC BLOOD PRESSURE: 60 MMHG | OXYGEN SATURATION: 97 % | RESPIRATION RATE: 16 BRPM | SYSTOLIC BLOOD PRESSURE: 136 MMHG

## 2018-02-23 DIAGNOSIS — M79.641 PAIN IN BOTH HANDS: ICD-10-CM

## 2018-02-23 DIAGNOSIS — M79.642 PAIN IN BOTH HANDS: ICD-10-CM

## 2018-02-23 DIAGNOSIS — M79.10 MYALGIA: ICD-10-CM

## 2018-02-23 LAB
ERYTHROCYTE [SEDIMENTATION RATE] IN BLOOD BY WESTERGREN METHOD: 43 MM/HOUR (ref 0–30)
RHEUMATOID FACT SER IA-ACNC: <10 IU/ML (ref 0–14)

## 2018-02-23 PROCEDURE — 83516 IMMUNOASSAY NONANTIBODY: CPT

## 2018-02-23 PROCEDURE — 99214 OFFICE O/P EST MOD 30 MIN: CPT | Performed by: INTERNAL MEDICINE

## 2018-02-23 PROCEDURE — 85652 RBC SED RATE AUTOMATED: CPT

## 2018-02-23 PROCEDURE — 86038 ANTINUCLEAR ANTIBODIES: CPT

## 2018-02-23 PROCEDURE — 77077 JOINT SURVEY SINGLE VIEW: CPT

## 2018-02-23 PROCEDURE — 86431 RHEUMATOID FACTOR QUANT: CPT

## 2018-02-23 PROCEDURE — 86200 CCP ANTIBODY: CPT

## 2018-02-23 PROCEDURE — 36415 COLL VENOUS BLD VENIPUNCTURE: CPT

## 2018-02-23 RX ORDER — PREDNISONE 10 MG/1
10 TABLET ORAL DAILY
Qty: 10 TAB | Refills: 0 | Status: SHIPPED | OUTPATIENT
Start: 2018-02-23 | End: 2018-03-06 | Stop reason: SDUPTHER

## 2018-02-23 NOTE — PROGRESS NOTES
CC: Follow-up myalgias and blood work.    HPI:   Felipa presents today with the following.    1. Myalgia  Presents after being seen for primarily myalgias of the legs. She was sent for blood work and placed on a Medrol Dosepak. She reports dramatic improvement in symptomology in her legs. Symptoms have returned approximately 2 days after stopping the steroids. Blood work reveals a positive sedimentation rate of 44. No other major findings and blood work.    2. Pain in both hands  She now mentions more pains in the hand but mostly wrist. She reports painful when pushing up to get up.      Patient Active Problem List    Diagnosis Date Noted   • Essential hypertension 04/18/2015     Priority: High   • Dyslipidemia 01/06/2015     Priority: Medium   • Acquired hypothyroidism 12/20/2014     Priority: Medium   • Vitamin D deficiency 03/11/2016       Current Outpatient Prescriptions   Medication Sig Dispense Refill   • predniSONE (DELTASONE) 10 MG Tab Take 1 Tab by mouth every day. 10 Tab 0   • acetaminophen (TYLENOL) 500 MG Tab Take 500-1,000 mg by mouth every 6 hours as needed.     • hydrALAZINE (APRESOLINE) 25 MG Tab Take 1 Tab by mouth 3 times a day. 90 Tab 3   • levothyroxine (SYNTHROID) 100 MCG Tab TAKE 1 TAB BY MOUTH EVERY MORNING BEFORE BREAKFAST. 90 Tab 3   • amlodipine (NORVASC) 5 MG Tab TAKE 1 TAB BY MOUTH 2 TIMES A DAY. 180 Tab 3   • Cholecalciferol (VITAMIN D-3 PO) Take 1 Cap by mouth every evening.     • MethylPREDNISolone (MEDROL DOSEPAK) 4 MG Tablet Therapy Pack Per package insert. (Patient not taking: Reported on 2/23/2018) 21 Tab 0     No current facility-administered medications for this visit.          Allergies as of 02/23/2018 - Reviewed 02/23/2018   Allergen Reaction Noted   • Lisinopril Diarrhea, Nausea, and Cough 07/27/2017   • Losartan Nausea and Cough 07/27/2017   • Pcn [penicillins] Hives 07/27/2017        ROS: Denies Chest pain, SOB, LE edema.    /60   Pulse 62   Temp 36.6 °C (97.9 °F)  "  Resp 16   Ht 1.626 m (5' 4\")   Wt 68 kg (150 lb)   SpO2 97%   BMI 25.75 kg/m²      Physical Exam:  Gen:         Alert and oriented, No apparent distress.  Neck:        No Lymphadenopathy or Bruits.  Lungs:     Clear to auscultation bilaterally  CV:          Regular rate and rhythm. No murmurs, rubs or gallops.               Ext:          No clubbing, cyanosis, edema.      Assessment and Plan.   85 y.o. female with the following issues.    1. Myalgia  Given the dramatic response to steroids elevation of sedimentation rate have referred to rheumatology. Suspicion for PMR. She is on hydralazine so sending for drug-induced antibodies. There are some mild hand complaints are looking for rheumatoid arthritis as well and have written for x-rays of the hands. Also placing on a 10 day course of low-dose steroids to see if we can still get a response.  - REFERRAL TO RHEUMATOLOGY  - RHEUMATOID ARTHRITIS FACTOR; Future  - CCP ANTIBODY; Future  - CAROL ANTIBODY WITH REFLEX; Future  - ANTI-HISTONE ABS; Future  - predniSONE (DELTASONE) 10 MG Tab; Take 1 Tab by mouth every day.  Dispense: 10 Tab; Refill: 0  - WESTERGREN SED RATE; Future    2. Pain in both hands  As above  - DX-JOINT SURVEY-HANDS SINGLE VIEW; Future      "

## 2018-02-24 LAB
CCP IGG SERPL-ACNC: 3 UNITS (ref 0–19)
NUCLEAR IGG SER QL IA: NORMAL

## 2018-02-25 LAB — HISTONE IGG SER IA-ACNC: 2.4 UNITS (ref 0–0.9)

## 2018-03-22 ENCOUNTER — OFFICE VISIT (OUTPATIENT)
Dept: MEDICAL GROUP | Facility: MEDICAL CENTER | Age: 83
End: 2018-03-22
Payer: MEDICARE

## 2018-03-22 VITALS
RESPIRATION RATE: 16 BRPM | HEIGHT: 64 IN | BODY MASS INDEX: 26.98 KG/M2 | TEMPERATURE: 98.3 F | WEIGHT: 158 LBS | DIASTOLIC BLOOD PRESSURE: 60 MMHG | SYSTOLIC BLOOD PRESSURE: 164 MMHG | HEART RATE: 62 BPM | OXYGEN SATURATION: 97 %

## 2018-03-22 DIAGNOSIS — T50.905A DRUG-INDUCED LUPUS ERYTHEMATOSUS: ICD-10-CM

## 2018-03-22 DIAGNOSIS — E55.9 VITAMIN D DEFICIENCY: ICD-10-CM

## 2018-03-22 DIAGNOSIS — L93.2 DRUG-INDUCED LUPUS ERYTHEMATOSUS: ICD-10-CM

## 2018-03-22 DIAGNOSIS — E03.9 ACQUIRED HYPOTHYROIDISM: ICD-10-CM

## 2018-03-22 DIAGNOSIS — Z00.00 MEDICARE ANNUAL WELLNESS VISIT, SUBSEQUENT: ICD-10-CM

## 2018-03-22 DIAGNOSIS — I10 ESSENTIAL HYPERTENSION: ICD-10-CM

## 2018-03-22 DIAGNOSIS — E78.5 DYSLIPIDEMIA: ICD-10-CM

## 2018-03-22 PROCEDURE — G0439 PPPS, SUBSEQ VISIT: HCPCS | Mod: 25 | Performed by: INTERNAL MEDICINE

## 2018-03-22 PROCEDURE — 99213 OFFICE O/P EST LOW 20 MIN: CPT | Mod: 25 | Performed by: INTERNAL MEDICINE

## 2018-03-22 RX ORDER — LOSARTAN POTASSIUM 100 MG/1
100 TABLET ORAL DAILY
Qty: 90 TAB | Refills: 3 | Status: SHIPPED | OUTPATIENT
Start: 2018-03-22 | End: 2019-08-09

## 2018-03-22 RX ORDER — CLONIDINE HYDROCHLORIDE 0.1 MG/1
0.1 TABLET ORAL 2 TIMES DAILY PRN
Qty: 60 TAB | Refills: 6 | Status: SHIPPED | OUTPATIENT
Start: 2018-03-22 | End: 2018-06-11

## 2018-03-22 ASSESSMENT — PATIENT HEALTH QUESTIONNAIRE - PHQ9
5. POOR APPETITE OR OVEREATING: 0 - NOT AT ALL
SUM OF ALL RESPONSES TO PHQ QUESTIONS 1-9: 1
CLINICAL INTERPRETATION OF PHQ2 SCORE: 1

## 2018-03-22 ASSESSMENT — ACTIVITIES OF DAILY LIVING (ADL): BATHING_REQUIRES_ASSISTANCE: 0

## 2018-03-22 NOTE — PROGRESS NOTES
CC: Follow-up myalgias and hypertension due for wellness examination.    HPI:   Felipa presents today with the following.  Kermit describes their overall health as fair.    1. Medicare annual wellness visit, subsequent  Screenings performed below    2. Drug-induced lupus erythematosus  Patient recently presented with myalgias sent for blood work showing a positive for anti-histone making lupus-like reaction a possibility maintain on hydralazine. Steroids completely resolved her myalgias they return when she stopped. She comes in today to discuss definitive treatments.    3. Essential hypertension  She held her hydralazine this morning blood pressure is elevated. She is maintained on amlodipine. Has multiple reactions in the past medications however losartan no obvious but she did report nausea which does not make complete sense.          Information for advance directives given to patient or instructed to bring in advance directives into to office to put in chart.      Depression Screening    Little interest or pleasure in doing things?  1 - several days  Feeling down, depressed , or hopeless? 0 - not at all  Trouble falling or staying asleep, or sleeping too much?  0 - not at all  Feeling tired or having little energy?  0 - not at all  Poor appetite or overeating?  0 - not at all  Feeling bad about yourself - or that you are a failure or have let yourself or your family down? 0 - not at all  Trouble concentrating on things, such as reading the newspaper or watching television? 0 - not at all  Moving or speaking so slowly that other people could have noticed.  Or the opposite - being so fidgety or restless that you have been moving around a lot more than usual?  0 - not at all  Thoughts that you would be better off dead, or of hurting yourself?  0 - not at all  Patient Health Questionnaire Score: 1    If depressive symptoms identified deferred to follow up visit unless specifically addressed in assessment and  plan.    Interpretation of PHQ-9 Total Score   Score Severity   1-4 No Depression   5-9 Mild Depression   10-14 Moderate Depression   15-19 Moderately Severe Depression   20-27 Severe Depression      Screening for Cognitive Impairment    Three Minute Recall (apple, watch, nils)  2/3    Draw clock face with all 12 numbers set to the hand to show 10 minutes past 11 o'clock  1    Cognitive concerns identified deferred for follow up unless specifically addressed in assessment and plan.    Fall Risk Assessment    Has the patient had two or more falls in the last year or any fall with injury in the last year?       Safety Assessment    Throw rugs on floor.  No  Handrails on all stairs.  Yes  Good lighting in all hallways.  Yes  Difficulty hearing.  Yes  Has hearing aids.   Patient counseled about all safety risks that were identified.    Functional Assessment ADLs    Are there any barriers preventing you from cooking for yourself or meeting nutritional needs?  No.    Are there any barriers preventing you from driving safely or obtaining transportation?  No.    Are there any barriers preventing you from using a telephone or calling for help?  No.    Are there any barriers preventing you from shopping?  No.    Are there any barriers preventing you from taking care of your own finances?  No.    Are there any barriers preventing you from managing your medications?  No.    Are there any barriers preventing you from showering, bathing or dressing yourself? No.    Are currently engaging any exercise or physical activity?  Yes.       Health Maintenance Summary                IMM INFLUENZA Overdue 9/1/2017 Past season     Done 9/19/2016 Imm Admin: Influenza Vaccine Adult HD    IMM DTaP/Tdap/Td Vaccine Postponed 3/16/2021 Originally 9/8/1951. Insurance/Financial    Annual Wellness Visit Next Due 9/27/2018      Done 9/26/2017      Patient has more history with this topic...    BONE DENSITY Next Due 9/27/2021      Done 9/27/2016  DS-BONE DENSITY STUDY (DEXA)          Patient Care Team:  Kirill Friedman M.D. as PCP - General (Internal Medicine)  Jerrod Johnson M.D. as Consulting Physician (Otolaryngology)  Adalberto Burton M.D. as Consulting Physician (Ophthalmology)          Health Care Screening: Age-appropriate preventive services that Medicare covers were discussed today and ordered as indicated and agreed upon by the patient, and as recommended by USPTF and ACIP.     Patient Active Problem List    Diagnosis Date Noted   • Essential hypertension 04/18/2015     Priority: High   • Dyslipidemia 01/06/2015     Priority: Medium   • Acquired hypothyroidism 12/20/2014     Priority: Medium   • Vitamin D deficiency 03/11/2016       Current Outpatient Prescriptions   Medication Sig Dispense Refill   • losartan (COZAAR) 100 MG Tab Take 1 Tab by mouth every day. 90 Tab 3   • cloNIDine (CATAPRES) 0.1 MG Tab Take 1 Tab by mouth 2 times a day as needed. For systolic blood pressure over 160 60 Tab 6   • predniSONE (DELTASONE) 10 MG Tab Take 1 Tab by mouth every day. 10 Tab 0   • acetaminophen (TYLENOL) 500 MG Tab Take 500-1,000 mg by mouth every 6 hours as needed.     • levothyroxine (SYNTHROID) 100 MCG Tab TAKE 1 TAB BY MOUTH EVERY MORNING BEFORE BREAKFAST. 90 Tab 3   • amlodipine (NORVASC) 5 MG Tab TAKE 1 TAB BY MOUTH 2 TIMES A DAY. 180 Tab 3   • Cholecalciferol (VITAMIN D-3 PO) Take 1 Cap by mouth every evening.       No current facility-administered medications for this visit.        Family History   Problem Relation Age of Onset   • Thyroid Mother    • Heart Attack Father      MI at age 65   • Hypertension Sister    • Heart Disease Sister      Stent   • Alzheimer's Disease Brother    • Diabetes Sister    • Heart Disease Sister      rheumatic heart disease   • Arthritis Sister    • Alzheimer's Disease Brother        Social History     Social History   • Marital status:      Spouse name: N/A   • Number of children: N/A   • Years of education:  "N/A     Occupational History   • Not on file.     Social History Main Topics   • Smoking status: Former Smoker     Packs/day: 2.50     Years: 15.00     Quit date: 1/6/1962   • Smokeless tobacco: Never Used      Comment: age 30   • Alcohol use 0.0 oz/week   • Drug use: No   • Sexual activity: No     Other Topics Concern   • Not on file     Social History Narrative   • No narrative on file       Past Surgical History:   Procedure Laterality Date   • ABDOMINAL HYSTERECTOMY TOTAL         Allergies as of 03/22/2018 - Reviewed 03/22/2018   Allergen Reaction Noted   • Lisinopril Diarrhea, Nausea, and Cough 07/27/2017   • Losartan Nausea and Cough 07/27/2017   • Pcn [penicillins] Hives 07/27/2017        ROS: Denies Chest pain, SOB, LE edema.    BP (!) 164/60   Pulse 62   Temp 36.8 °C (98.3 °F)   Resp 16   Ht 1.626 m (5' 4\")   Wt 71.7 kg (158 lb)   SpO2 97%   BMI 27.12 kg/m²      Physical Exam:  Gen:         Alert and oriented, No apparent distress.  Neck:        No Lymphadenopathy or Bruits.  Lungs:     Clear to auscultation bilaterally  CV:          Regular rate and rhythm. No murmurs, rubs or gallops.  Abd:         Soft non tender, non distended. Normal active bowel sounds.  No  Hepatosplenomegaly, No pulsatile masses.                   Ext:          No clubbing, cyanosis, edema.      Assessment and Plan.   85 y.o. female with the following issues.    1. Medicare annual wellness visit, subsequent  Discussed healthy lifestyle habits as well as screening regimens. Advanced directives already on file    2. Drug-induced lupus erythematosus  Discontinuing hydralazine will manage the progress of her improve my is over the coming weeks. She has stopped steroid for the last 2 days will stick with Tylenol.    3. Essential hypertension  Adding losartan to amlodipine have also given clonidine to have on hand when necessary. Will see back in 2-3 weeks.    4. Dyslipidemia  Managed with diet no change in therapy    5. Acquired " hypothyroidism  Currently adequately replaced, recheck 6 months to one year.    6. Vitamin D deficiency  Continue vitamin D.        Referrals offered: Community-based lifestyle interventions to reduce health risks and promote self-management and wellness, fall prevention, nutrition, physical activity, tobacco-use cessation, weight loss, and mental health services as per orders if indicated.    Discussion today about general wellness and lifestyle habits:    · Prevent falls and reduce trip hazards; Cautioned about securing or removing rugs.  · Have a working fire alarm and carbon monoxide detector;   · Engage in regular physical activity and social activities

## 2018-04-13 ENCOUNTER — OFFICE VISIT (OUTPATIENT)
Dept: MEDICAL GROUP | Facility: MEDICAL CENTER | Age: 83
End: 2018-04-13
Payer: MEDICARE

## 2018-04-13 VITALS
OXYGEN SATURATION: 97 % | WEIGHT: 159 LBS | SYSTOLIC BLOOD PRESSURE: 146 MMHG | BODY MASS INDEX: 27.14 KG/M2 | RESPIRATION RATE: 16 BRPM | HEART RATE: 59 BPM | DIASTOLIC BLOOD PRESSURE: 60 MMHG | TEMPERATURE: 98.6 F | HEIGHT: 64 IN

## 2018-04-13 DIAGNOSIS — L93.2 DRUG-INDUCED LUPUS ERYTHEMATOSUS DUE TO HYDRALAZINE: ICD-10-CM

## 2018-04-13 DIAGNOSIS — M79.10 MYALGIA: ICD-10-CM

## 2018-04-13 DIAGNOSIS — T46.5X5A DRUG-INDUCED LUPUS ERYTHEMATOSUS DUE TO HYDRALAZINE: ICD-10-CM

## 2018-04-13 DIAGNOSIS — I10 ESSENTIAL HYPERTENSION: ICD-10-CM

## 2018-04-13 PROCEDURE — 99214 OFFICE O/P EST MOD 30 MIN: CPT | Performed by: INTERNAL MEDICINE

## 2018-04-13 RX ORDER — HYDROCHLOROTHIAZIDE 12.5 MG/1
12.5 CAPSULE, GELATIN COATED ORAL DAILY
Qty: 30 CAP | Refills: 6 | Status: SHIPPED | OUTPATIENT
Start: 2018-04-13 | End: 2018-05-11

## 2018-04-13 NOTE — PROGRESS NOTES
CC: Follow-up hypertension and myalgias.    HPI:   Felipa presents today with the following.    1. Drug-induced lupus erythematosus due to hydralazine  Has been discontinued on hydralazine she reports her myalgias are present but improving significantly.    2. Essential hypertension  Blood pressures are still elevated running into the 170s on occasion tolerating current medications well without side effect.    3. Myalgia  Myalgias are more intermittent and at worst 5 out of 10 controlled with Tylenol.      Patient Active Problem List    Diagnosis Date Noted   • Essential hypertension 04/18/2015     Priority: High   • Dyslipidemia 01/06/2015     Priority: Medium   • Acquired hypothyroidism 12/20/2014     Priority: Medium   • Drug-induced lupus erythematosus due to hydralazine 04/13/2018   • Vitamin D deficiency 03/11/2016       Current Outpatient Prescriptions   Medication Sig Dispense Refill   • hydrochlorothiazide (MICROZIDE) 12.5 MG capsule Take 1 Cap by mouth every day. 30 Cap 6   • NON SPECIFIED Front wheel walker. 1 Each 0   • losartan (COZAAR) 100 MG Tab Take 1 Tab by mouth every day. 90 Tab 3   • cloNIDine (CATAPRES) 0.1 MG Tab Take 1 Tab by mouth 2 times a day as needed. For systolic blood pressure over 160 60 Tab 6   • predniSONE (DELTASONE) 10 MG Tab Take 1 Tab by mouth every day. 10 Tab 0   • acetaminophen (TYLENOL) 500 MG Tab Take 500-1,000 mg by mouth every 6 hours as needed.     • levothyroxine (SYNTHROID) 100 MCG Tab TAKE 1 TAB BY MOUTH EVERY MORNING BEFORE BREAKFAST. 90 Tab 3   • amlodipine (NORVASC) 5 MG Tab TAKE 1 TAB BY MOUTH 2 TIMES A DAY. 180 Tab 3   • Cholecalciferol (VITAMIN D-3 PO) Take 1 Cap by mouth every evening.       No current facility-administered medications for this visit.          Allergies as of 04/13/2018 - Reviewed 04/13/2018   Allergen Reaction Noted   • Lisinopril Diarrhea, Nausea, and Cough 07/27/2017   • Losartan Nausea and Cough 07/27/2017   • Pcn [penicillins] Hives  "07/27/2017        ROS: Denies Chest pain, SOB, LE edema.    /60   Pulse (!) 59   Temp 37 °C (98.6 °F)   Resp 16   Ht 1.626 m (5' 4\")   Wt 72.1 kg (159 lb)   SpO2 97%   BMI 27.29 kg/m²      Physical Exam:  Gen:         Alert and oriented, No apparent distress.  Neck:        No Lymphadenopathy or Bruits.  Lungs:     Clear to auscultation bilaterally  CV:          Regular rate and rhythm. No murmurs, rubs or gallops.               Ext:          No clubbing, cyanosis, edema.      Assessment and Plan.   85 y.o. female with the following issues.    1. Drug-induced lupus erythematosus due to hydralazine  Continue holding hydralazine. Will check a sedimentation rate that was slightly elevated last check.  - WESTERGREN SED RATE; Future      2. Essential hypertension  Persistent elevations have added low-dose hydrochlorothiazide.  - hydrochlorothiazide (MICROZIDE) 12.5 MG capsule; Take 1 Cap by mouth every day.  Dispense: 30 Cap; Refill: 6    3. Myalgia  Likely related to #1 improving in nature treating with Tylenol when necessary.  - COMP METABOLIC PANEL; Future  - NON SPECIFIED; Front wheel walker.  Dispense: 1 Each; Refill: 0      "

## 2018-05-01 ENCOUNTER — HOSPITAL ENCOUNTER (OUTPATIENT)
Dept: LAB | Facility: MEDICAL CENTER | Age: 83
End: 2018-05-01
Attending: SURGERY
Payer: MEDICARE

## 2018-05-01 DIAGNOSIS — T46.5X5A DRUG-INDUCED LUPUS ERYTHEMATOSUS DUE TO HYDRALAZINE: ICD-10-CM

## 2018-05-01 DIAGNOSIS — M79.10 MYALGIA: ICD-10-CM

## 2018-05-01 DIAGNOSIS — L93.2 DRUG-INDUCED LUPUS ERYTHEMATOSUS DUE TO HYDRALAZINE: ICD-10-CM

## 2018-05-01 LAB
ALBUMIN SERPL BCP-MCNC: 3.8 G/DL (ref 3.2–4.9)
ALBUMIN/GLOB SERPL: 1.2 G/DL
ALP SERPL-CCNC: 50 U/L (ref 30–99)
ALT SERPL-CCNC: 15 U/L (ref 2–50)
ANION GAP SERPL CALC-SCNC: 12 MMOL/L (ref 0–11.9)
AST SERPL-CCNC: 17 U/L (ref 12–45)
BILIRUB SERPL-MCNC: 0.6 MG/DL (ref 0.1–1.5)
BUN SERPL-MCNC: 25 MG/DL (ref 8–22)
CALCIUM SERPL-MCNC: 9.3 MG/DL (ref 8.5–10.5)
CHLORIDE SERPL-SCNC: 106 MMOL/L (ref 96–112)
CO2 SERPL-SCNC: 21 MMOL/L (ref 20–33)
CREAT SERPL-MCNC: 1.08 MG/DL (ref 0.5–1.4)
ERYTHROCYTE [SEDIMENTATION RATE] IN BLOOD BY WESTERGREN METHOD: 41 MM/HOUR (ref 0–30)
GLOBULIN SER CALC-MCNC: 3.3 G/DL (ref 1.9–3.5)
GLUCOSE SERPL-MCNC: 105 MG/DL (ref 65–99)
POTASSIUM SERPL-SCNC: 4.1 MMOL/L (ref 3.6–5.5)
PROT SERPL-MCNC: 7.1 G/DL (ref 6–8.2)
SODIUM SERPL-SCNC: 139 MMOL/L (ref 135–145)

## 2018-05-01 PROCEDURE — 80053 COMPREHEN METABOLIC PANEL: CPT

## 2018-05-01 PROCEDURE — 36415 COLL VENOUS BLD VENIPUNCTURE: CPT

## 2018-05-01 PROCEDURE — 85652 RBC SED RATE AUTOMATED: CPT

## 2018-05-03 DIAGNOSIS — M79.10 MYALGIA: ICD-10-CM

## 2018-05-03 RX ORDER — PREDNISONE 10 MG/1
10 TABLET ORAL DAILY
Qty: 10 TAB | Refills: 0 | Status: SHIPPED | OUTPATIENT
Start: 2018-05-03 | End: 2018-05-11

## 2018-05-11 ENCOUNTER — OFFICE VISIT (OUTPATIENT)
Dept: MEDICAL GROUP | Facility: MEDICAL CENTER | Age: 83
End: 2018-05-11
Payer: MEDICARE

## 2018-05-11 VITALS
SYSTOLIC BLOOD PRESSURE: 144 MMHG | TEMPERATURE: 98.5 F | DIASTOLIC BLOOD PRESSURE: 64 MMHG | HEART RATE: 70 BPM | RESPIRATION RATE: 16 BRPM | OXYGEN SATURATION: 97 % | BODY MASS INDEX: 27.49 KG/M2 | WEIGHT: 161 LBS | HEIGHT: 64 IN

## 2018-05-11 DIAGNOSIS — L93.2 DRUG-INDUCED LUPUS ERYTHEMATOSUS DUE TO HYDRALAZINE: ICD-10-CM

## 2018-05-11 DIAGNOSIS — I10 ESSENTIAL HYPERTENSION: ICD-10-CM

## 2018-05-11 DIAGNOSIS — M79.10 MYALGIA: ICD-10-CM

## 2018-05-11 DIAGNOSIS — T46.5X5A DRUG-INDUCED LUPUS ERYTHEMATOSUS DUE TO HYDRALAZINE: ICD-10-CM

## 2018-05-11 PROCEDURE — 99214 OFFICE O/P EST MOD 30 MIN: CPT | Performed by: INTERNAL MEDICINE

## 2018-05-11 RX ORDER — PREDNISONE 10 MG/1
10 TABLET ORAL 2 TIMES DAILY
Qty: 60 TAB | Refills: 3 | Status: SHIPPED | OUTPATIENT
Start: 2018-05-11 | End: 2018-06-11

## 2018-05-11 RX ORDER — HYDROCHLOROTHIAZIDE 25 MG/1
25 TABLET ORAL DAILY
Qty: 90 TAB | Refills: 3 | Status: ON HOLD | OUTPATIENT
Start: 2018-05-11 | End: 2018-06-14

## 2018-05-11 NOTE — PROGRESS NOTES
CC: Follow-up hypertension and myalgia    HPI:   Felipa presents today with the following.    1. Essential hypertension  Presents after having been started on hydrochlorothiazide with some improvement in blood pressure but still elevated at home readings in the 150s persistently.  Denying any chest pain or shortness of breath.    2. Myalgia  Initially thought possibly due drug-induced by hydralazine.  She has been off medication for over a month her myalgias have not improved.  She reports persistent response to steroids.  10 mg gets a 50% reduction.  She does have a referral into rheumatology but has not yet made the appointment.        Patient Active Problem List    Diagnosis Date Noted   • Essential hypertension 04/18/2015     Priority: High   • Dyslipidemia 01/06/2015     Priority: Medium   • Acquired hypothyroidism 12/20/2014     Priority: Medium   • Drug-induced lupus erythematosus due to hydralazine 04/13/2018   • Vitamin D deficiency 03/11/2016       Current Outpatient Prescriptions   Medication Sig Dispense Refill   • predniSONE (DELTASONE) 10 MG Tab Take 1 Tab by mouth 2 times a day. 60 Tab 3   • hydroCHLOROthiazide (HYDRODIURIL) 25 MG Tab Take 1 Tab by mouth every day. 90 Tab 3   • NON SPECIFIED Front wheel walker. 1 Each 0   • losartan (COZAAR) 100 MG Tab Take 1 Tab by mouth every day. 90 Tab 3   • cloNIDine (CATAPRES) 0.1 MG Tab Take 1 Tab by mouth 2 times a day as needed. For systolic blood pressure over 160 60 Tab 6   • acetaminophen (TYLENOL) 500 MG Tab Take 500-1,000 mg by mouth every 6 hours as needed.     • levothyroxine (SYNTHROID) 100 MCG Tab TAKE 1 TAB BY MOUTH EVERY MORNING BEFORE BREAKFAST. 90 Tab 3   • amlodipine (NORVASC) 5 MG Tab TAKE 1 TAB BY MOUTH 2 TIMES A DAY. 180 Tab 3   • Cholecalciferol (VITAMIN D-3 PO) Take 1 Cap by mouth every evening.       No current facility-administered medications for this visit.          Allergies as of 05/11/2018 - Reviewed 05/11/2018   Allergen Reaction  "Noted   • Lisinopril Diarrhea, Nausea, and Cough 07/27/2017   • Losartan Nausea and Cough 07/27/2017   • Pcn [penicillins] Hives 07/27/2017        ROS: Denies Chest pain, SOB, LE edema.    /64   Pulse 70   Temp 36.9 °C (98.5 °F)   Resp 16   Ht 1.626 m (5' 4\")   Wt 73 kg (161 lb)   SpO2 97%   BMI 27.64 kg/m²     Physical Exam:  Gen:         Alert and oriented, No apparent distress.  Neck:        No Lymphadenopathy or Bruits.  Lungs:     Clear to auscultation bilaterally  CV:          Regular rate and rhythm. No murmurs, rubs or gallops.               Ext:          No clubbing, cyanosis, edema.      Assessment and Plan.   85 y.o. female with the following issues.    1. Essential hypertension  Increasing hydrochlorothiazide to 25 mg    2. Myalgia  She will call rheumatology have increased steroids to 20 mg per day.  - predniSONE (DELTASONE) 10 MG Tab; Take 1 Tab by mouth 2 times a day.  Dispense: 60 Tab; Refill: 3      "

## 2018-06-10 ENCOUNTER — HOSPITAL ENCOUNTER (EMERGENCY)
Facility: MEDICAL CENTER | Age: 83
DRG: 301 | End: 2018-06-10
Attending: EMERGENCY MEDICINE
Payer: MEDICARE

## 2018-06-10 ENCOUNTER — APPOINTMENT (OUTPATIENT)
Dept: RADIOLOGY | Facility: MEDICAL CENTER | Age: 83
DRG: 301 | End: 2018-06-10
Attending: EMERGENCY MEDICINE
Payer: MEDICARE

## 2018-06-10 VITALS
HEIGHT: 64 IN | SYSTOLIC BLOOD PRESSURE: 134 MMHG | HEART RATE: 54 BPM | RESPIRATION RATE: 18 BRPM | DIASTOLIC BLOOD PRESSURE: 62 MMHG | OXYGEN SATURATION: 95 % | BODY MASS INDEX: 28.98 KG/M2 | TEMPERATURE: 97.8 F | WEIGHT: 169.75 LBS
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
## 2018-06-10 LAB
ANION GAP SERPL CALC-SCNC: 9 MMOL/L (ref 0–11.9)
BUN SERPL-MCNC: 30 MG/DL (ref 8–22)
CALCIUM SERPL-MCNC: 9.3 MG/DL (ref 8.5–10.5)
CHLORIDE SERPL-SCNC: 104 MMOL/L (ref 96–112)
CO2 SERPL-SCNC: 23 MMOL/L (ref 20–33)
CREAT SERPL-MCNC: 1 MG/DL (ref 0.5–1.4)
GLUCOSE SERPL-MCNC: 132 MG/DL (ref 65–99)
POTASSIUM SERPL-SCNC: 3.6 MMOL/L (ref 3.6–5.5)
SODIUM SERPL-SCNC: 136 MMOL/L (ref 135–145)

## 2018-06-10 PROCEDURE — 73502 X-RAY EXAM HIP UNI 2-3 VIEWS: CPT | Mod: LT

## 2018-06-10 PROCEDURE — 80048 BASIC METABOLIC PNL TOTAL CA: CPT

## 2018-06-10 PROCEDURE — 36415 COLL VENOUS BLD VENIPUNCTURE: CPT

## 2018-06-10 PROCEDURE — 99284 EMERGENCY DEPT VISIT MOD MDM: CPT

## 2018-06-10 ASSESSMENT — LIFESTYLE VARIABLES: DO YOU DRINK ALCOHOL: NO

## 2018-06-10 ASSESSMENT — PAIN SCALES - GENERAL: PAINLEVEL_OUTOF10: 0

## 2018-06-11 ENCOUNTER — APPOINTMENT (OUTPATIENT)
Dept: RADIOLOGY | Facility: MEDICAL CENTER | Age: 83
DRG: 301 | End: 2018-06-11
Attending: HOSPITALIST
Payer: MEDICARE

## 2018-06-11 ENCOUNTER — HOSPITAL ENCOUNTER (INPATIENT)
Facility: MEDICAL CENTER | Age: 83
LOS: 3 days | DRG: 301 | End: 2018-06-14
Attending: EMERGENCY MEDICINE | Admitting: HOSPITALIST
Payer: MEDICARE

## 2018-06-11 ENCOUNTER — APPOINTMENT (OUTPATIENT)
Dept: RADIOLOGY | Facility: MEDICAL CENTER | Age: 83
DRG: 301 | End: 2018-06-11
Attending: EMERGENCY MEDICINE
Payer: MEDICARE

## 2018-06-11 DIAGNOSIS — R29.898 LEFT LEG WEAKNESS: ICD-10-CM
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
## 2018-06-11 DIAGNOSIS — I73.9 PERIPHERAL VASCULAR DISEASE (HCC): ICD-10-CM

## 2018-06-11 PROBLEM — M25.552 PAIN OF LEFT HIP JOINT: Status: ACTIVE | Noted: 2018-06-11

## 2018-06-11 LAB
ALBUMIN SERPL BCP-MCNC: 4.2 G/DL (ref 3.2–4.9)
ALBUMIN/GLOB SERPL: 1.3 G/DL
ALP SERPL-CCNC: 44 U/L (ref 30–99)
ALT SERPL-CCNC: 21 U/L (ref 2–50)
ANION GAP SERPL CALC-SCNC: 11 MMOL/L (ref 0–11.9)
APTT PPP: 27.9 SEC (ref 24.7–36)
AST SERPL-CCNC: 21 U/L (ref 12–45)
BASOPHILS # BLD AUTO: 0.4 % (ref 0–1.8)
BASOPHILS # BLD: 0.04 K/UL (ref 0–0.12)
BILIRUB SERPL-MCNC: 1.2 MG/DL (ref 0.1–1.5)
BUN SERPL-MCNC: 25 MG/DL (ref 8–22)
CALCIUM SERPL-MCNC: 9.8 MG/DL (ref 8.5–10.5)
CHLORIDE SERPL-SCNC: 105 MMOL/L (ref 96–112)
CO2 SERPL-SCNC: 22 MMOL/L (ref 20–33)
CREAT SERPL-MCNC: 1.01 MG/DL (ref 0.5–1.4)
EOSINOPHIL # BLD AUTO: 0.01 K/UL (ref 0–0.51)
EOSINOPHIL NFR BLD: 0.1 % (ref 0–6.9)
ERYTHROCYTE [DISTWIDTH] IN BLOOD BY AUTOMATED COUNT: 46.8 FL (ref 35.9–50)
GLOBULIN SER CALC-MCNC: 3.3 G/DL (ref 1.9–3.5)
GLUCOSE SERPL-MCNC: 105 MG/DL (ref 65–99)
HCT VFR BLD AUTO: 44.2 % (ref 37–47)
HGB BLD-MCNC: 14.5 G/DL (ref 12–16)
IMM GRANULOCYTES # BLD AUTO: 0.06 K/UL (ref 0–0.11)
IMM GRANULOCYTES NFR BLD AUTO: 0.6 % (ref 0–0.9)
INR PPP: 0.93 (ref 0.87–1.13)
LYMPHOCYTES # BLD AUTO: 1.12 K/UL (ref 1–4.8)
LYMPHOCYTES NFR BLD: 11.8 % (ref 22–41)
MCH RBC QN AUTO: 31 PG (ref 27–33)
MCHC RBC AUTO-ENTMCNC: 32.8 G/DL (ref 33.6–35)
MCV RBC AUTO: 94.4 FL (ref 81.4–97.8)
MONOCYTES # BLD AUTO: 0.5 K/UL (ref 0–0.85)
MONOCYTES NFR BLD AUTO: 5.3 % (ref 0–13.4)
NEUTROPHILS # BLD AUTO: 7.79 K/UL (ref 2–7.15)
NEUTROPHILS NFR BLD: 81.8 % (ref 44–72)
NRBC # BLD AUTO: 0 K/UL
NRBC BLD-RTO: 0 /100 WBC
PLATELET # BLD AUTO: 181 K/UL (ref 164–446)
PMV BLD AUTO: 8.7 FL (ref 9–12.9)
POTASSIUM SERPL-SCNC: 4.1 MMOL/L (ref 3.6–5.5)
PROT SERPL-MCNC: 7.5 G/DL (ref 6–8.2)
PROTHROMBIN TIME: 12.2 SEC (ref 12–14.6)
RBC # BLD AUTO: 4.68 M/UL (ref 4.2–5.4)
SODIUM SERPL-SCNC: 138 MMOL/L (ref 135–145)
WBC # BLD AUTO: 9.5 K/UL (ref 4.8–10.8)

## 2018-06-11 PROCEDURE — 70450 CT HEAD/BRAIN W/O DYE: CPT

## 2018-06-11 PROCEDURE — 99221 1ST HOSP IP/OBS SF/LOW 40: CPT | Performed by: HOSPITALIST

## 2018-06-11 PROCEDURE — 700102 HCHG RX REV CODE 250 W/ 637 OVERRIDE(OP): Performed by: HOSPITALIST

## 2018-06-11 PROCEDURE — 80053 COMPREHEN METABOLIC PANEL: CPT

## 2018-06-11 PROCEDURE — 85610 PROTHROMBIN TIME: CPT

## 2018-06-11 PROCEDURE — 73700 CT LOWER EXTREMITY W/O DYE: CPT | Mod: LT

## 2018-06-11 PROCEDURE — 85025 COMPLETE CBC W/AUTO DIFF WBC: CPT

## 2018-06-11 PROCEDURE — 85730 THROMBOPLASTIN TIME PARTIAL: CPT

## 2018-06-11 PROCEDURE — A9270 NON-COVERED ITEM OR SERVICE: HCPCS | Performed by: HOSPITALIST

## 2018-06-11 PROCEDURE — 93922 UPR/L XTREMITY ART 2 LEVELS: CPT

## 2018-06-11 PROCEDURE — 99285 EMERGENCY DEPT VISIT HI MDM: CPT

## 2018-06-11 PROCEDURE — 770006 HCHG ROOM/CARE - MED/SURG/GYN SEMI*

## 2018-06-11 PROCEDURE — 93926 LOWER EXTREMITY STUDY: CPT

## 2018-06-11 RX ORDER — ACETAMINOPHEN 160 MG
1 TABLET,DISINTEGRATING ORAL EVERY EVENING
COMMUNITY
End: 2019-08-09

## 2018-06-11 RX ORDER — BISACODYL 10 MG
10 SUPPOSITORY, RECTAL RECTAL
Status: DISCONTINUED | OUTPATIENT
Start: 2018-06-11 | End: 2018-06-14 | Stop reason: HOSPADM

## 2018-06-11 RX ORDER — AMOXICILLIN 250 MG
2 CAPSULE ORAL 2 TIMES DAILY
Status: DISCONTINUED | OUTPATIENT
Start: 2018-06-11 | End: 2018-06-14 | Stop reason: HOSPADM

## 2018-06-11 RX ORDER — OXYCODONE HYDROCHLORIDE 5 MG/1
5 TABLET ORAL EVERY 4 HOURS PRN
Status: DISCONTINUED | OUTPATIENT
Start: 2018-06-11 | End: 2018-06-14 | Stop reason: HOSPADM

## 2018-06-11 RX ORDER — ONDANSETRON 2 MG/ML
4 INJECTION INTRAMUSCULAR; INTRAVENOUS EVERY 4 HOURS PRN
Status: DISCONTINUED | OUTPATIENT
Start: 2018-06-11 | End: 2018-06-14 | Stop reason: HOSPADM

## 2018-06-11 RX ORDER — LOSARTAN POTASSIUM 50 MG/1
100 TABLET ORAL DAILY
Status: DISCONTINUED | OUTPATIENT
Start: 2018-06-12 | End: 2018-06-13

## 2018-06-11 RX ORDER — PREDNISONE 20 MG/1
10 TABLET ORAL DAILY
Status: DISCONTINUED | OUTPATIENT
Start: 2018-06-12 | End: 2018-06-14 | Stop reason: HOSPADM

## 2018-06-11 RX ORDER — POLYETHYLENE GLYCOL 3350 17 G/17G
1 POWDER, FOR SOLUTION ORAL
Status: DISCONTINUED | OUTPATIENT
Start: 2018-06-11 | End: 2018-06-14 | Stop reason: HOSPADM

## 2018-06-11 RX ORDER — LEVOTHYROXINE SODIUM 0.1 MG/1
100 TABLET ORAL
Status: DISCONTINUED | OUTPATIENT
Start: 2018-06-12 | End: 2018-06-14 | Stop reason: HOSPADM

## 2018-06-11 RX ORDER — ONDANSETRON 4 MG/1
4 TABLET, ORALLY DISINTEGRATING ORAL EVERY 4 HOURS PRN
Status: DISCONTINUED | OUTPATIENT
Start: 2018-06-11 | End: 2018-06-14 | Stop reason: HOSPADM

## 2018-06-11 RX ORDER — AMLODIPINE BESYLATE 5 MG/1
5 TABLET ORAL 2 TIMES DAILY
Status: DISCONTINUED | OUTPATIENT
Start: 2018-06-11 | End: 2018-06-14 | Stop reason: HOSPADM

## 2018-06-11 RX ORDER — ACETAMINOPHEN 325 MG/1
650 TABLET ORAL EVERY 6 HOURS PRN
Status: DISCONTINUED | OUTPATIENT
Start: 2018-06-11 | End: 2018-06-14 | Stop reason: HOSPADM

## 2018-06-11 RX ORDER — PREDNISONE 10 MG/1
10 TABLET ORAL DAILY
COMMUNITY
End: 2019-08-09

## 2018-06-11 RX ORDER — ATORVASTATIN CALCIUM 20 MG/1
20 TABLET, FILM COATED ORAL
Status: DISCONTINUED | OUTPATIENT
Start: 2018-06-11 | End: 2018-06-13

## 2018-06-11 RX ORDER — HYDROCHLOROTHIAZIDE 25 MG/1
25 TABLET ORAL DAILY
Status: DISCONTINUED | OUTPATIENT
Start: 2018-06-12 | End: 2018-06-12

## 2018-06-11 RX ORDER — CLONIDINE HYDROCHLORIDE 0.1 MG/1
0.1 TABLET ORAL EVERY EVENING
Status: ON HOLD | COMMUNITY
End: 2018-06-14

## 2018-06-11 RX ADMIN — ATORVASTATIN CALCIUM 20 MG: 20 TABLET, FILM COATED ORAL at 21:02

## 2018-06-11 RX ADMIN — AMLODIPINE BESYLATE 5 MG: 5 TABLET ORAL at 21:02

## 2018-06-11 RX ADMIN — ASPIRIN 81 MG: 81 TABLET, COATED ORAL at 21:01

## 2018-06-11 ASSESSMENT — COGNITIVE AND FUNCTIONAL STATUS - GENERAL
MOVING FROM LYING ON BACK TO SITTING ON SIDE OF FLAT BED: A LITTLE
SUGGESTED CMS G CODE MODIFIER DAILY ACTIVITY: CK
MOBILITY SCORE: 18
TURNING FROM BACK TO SIDE WHILE IN FLAT BAD: A LITTLE
MOVING TO AND FROM BED TO CHAIR: A LITTLE
TOILETING: A LITTLE
SUGGESTED CMS G CODE MODIFIER MOBILITY: CK
DRESSING REGULAR UPPER BODY CLOTHING: A LITTLE
EATING MEALS: A LITTLE
HELP NEEDED FOR BATHING: A LITTLE
CLIMB 3 TO 5 STEPS WITH RAILING: A LITTLE
DRESSING REGULAR LOWER BODY CLOTHING: A LITTLE
WALKING IN HOSPITAL ROOM: A LITTLE
STANDING UP FROM CHAIR USING ARMS: A LITTLE
DAILY ACTIVITIY SCORE: 19

## 2018-06-11 ASSESSMENT — ENCOUNTER SYMPTOMS
FEVER: 0
FALLS: 1
SHORTNESS OF BREATH: 0

## 2018-06-11 ASSESSMENT — LIFESTYLE VARIABLES
ALCOHOL_USE: NO
EVER_SMOKED: NEVER

## 2018-06-11 ASSESSMENT — PATIENT HEALTH QUESTIONNAIRE - PHQ9
1. LITTLE INTEREST OR PLEASURE IN DOING THINGS: NOT AT ALL
SUM OF ALL RESPONSES TO PHQ9 QUESTIONS 1 AND 2: 0
2. FEELING DOWN, DEPRESSED, IRRITABLE, OR HOPELESS: NOT AT ALL

## 2018-06-11 ASSESSMENT — PAIN SCALES - GENERAL: PAINLEVEL_OUTOF10: 4

## 2018-06-11 NOTE — ED PROVIDER NOTES
"ED Provider Note    Scribed for Janae Mcnamara M.D. by Maryanne Beard. 6/11/2018, 10:54 AM.    Primary care provider: Kirill Friedman M.D.  Means of arrival: Ambulance   History obtained from: patient   History limited by: none       CHIEF COMPLAINT  Chief Complaint   Patient presents with   • GLF       HPI  Felipa Loyd is a 85 y.o. female who presents to the Emergency Department for evaluation of ground level falls that occurred last night and this morning. Patient denies having any dizziness prior to her fall. With both of these falls she fell onto her left side. She was evaluated in the ED last night after her first ground level fall and discharged home after her DX hip was negative.  The patient reports having an intermittent \"heaviness\" and weakness to her left leg in addition to an unsteadiness on her feet onset several months ago with worsening severity since yesterday. She denies having any left arm weakness or numbness. She lives with her son who has reported concern for his ability to care for the patient.       REVIEW OF SYSTEMS  Pertinent positives include left lower extremity weakness, unsteady gait.   Pertinent negatives include no left arm weakness or numbness, dizziness.   All other systems reviewed and negative. C.       PAST MEDICAL HISTORY   has a past medical history of Arthritis; Cholelithiasis (1/6/2015); Hyperlipidemia (1/6/2015); and Hypertension.      SURGICAL HISTORY   has a past surgical history that includes abdominal hysterectomy total.      SOCIAL HISTORY  Social History   Substance Use Topics   • Smoking status: Former Smoker     Packs/day: 2.50     Years: 15.00     Quit date: 1/6/1962   • Smokeless tobacco: Never Used      Comment: age 30   • Alcohol use 0.0 oz/week      History   Drug Use No       FAMILY HISTORY  Family History   Problem Relation Age of Onset   • Thyroid Mother    • Heart Attack Father      MI at age 65   • Hypertension Sister    • Heart Disease Sister      " "Stent   • Alzheimer's Disease Brother    • Diabetes Sister    • Heart Disease Sister      rheumatic heart disease   • Arthritis Sister    • Alzheimer's Disease Brother        CURRENT MEDICATIONS  Home Medications     Reviewed by Debby Calle (Pharmacy Tech) on 06/11/18 at 1409  Med List Status: Partial   Medication Last Dose Status   amlodipine (NORVASC) 5 MG Tab 6/11/2018 Active   Cholecalciferol (VITAMIN D3) 2000 UNIT Cap 6/10/2018 Active   cloNIDine (CATAPRES) 0.1 MG Tab 6/10/2018 Active   hydroCHLOROthiazide (HYDRODIURIL) 25 MG Tab 6/11/2018 Active   levothyroxine (SYNTHROID) 100 MCG Tab 6/11/2018 Active   losartan (COZAAR) 100 MG Tab 6/11/2018 Active   predniSONE (DELTASONE) 10 MG Tab 6/11/2018 Active                ALLERGIES  Allergies   Allergen Reactions   • Lisinopril Diarrhea, Nausea and Cough     GI problems, lost control   • Pcn [Penicillins] Hives     Reaction in 1967         PHYSICAL EXAM  VITAL SIGNS: /68   Pulse 69   Temp 36.2 °C (97.2 °F)   Resp 16   Ht 1.626 m (5' 4\")   Wt 76.7 kg (169 lb)   BMI 29.01 kg/m²   Constitutional: Alert in no apparent distress.  HENT: Resolving eccymosis to the left eye, Bilateral external ears normal, Nose normal.   Eyes: Pupils are equal and reactive, Conjunctiva normal, Non-icteric.   Neck: Normal range of motion, No tenderness, Supple, No stridor.   Cardiovascular: Regular rate and rhythm, no murmurs.   Thorax & Lungs: Normal breath sounds, No respiratory distress, No wheezing, No chest tenderness.   Abdomen: Bowel sounds normal, Soft, No tenderness, No masses, No peritoneal signs.  Skin: Warm, Dry, No erythema, No rash. Left leg is pale.   Musculoskeletal:  No major deformities noted.   Extremities: Unable to palpate DP pulse on the left lower extremity. 2+ DP pulse on the right lower extremity. Intact capillary refill on the right lower extremity and capillary refill is less than 2 seconds to the left lower extremity.   Neurologic: Alert, " moving all extremities without difficulty. Strength intact in all 4 extremities. No focal deficits.        LABS  Labs Reviewed   CBC WITH DIFFERENTIAL - Abnormal; Notable for the following:        Result Value    MCHC 32.8 (*)     MPV 8.7 (*)     Neutrophils-Polys 81.80 (*)     Lymphocytes 11.80 (*)     Neutrophils (Absolute) 7.79 (*)     All other components within normal limits    Narrative:     Indicate which anticoagulants the patient is on:->NONE   COMP METABOLIC PANEL - Abnormal; Notable for the following:     Glucose 105 (*)     Bun 25 (*)     All other components within normal limits    Narrative:     Indicate which anticoagulants the patient is on:->NONE   ESTIMATED GFR - Abnormal; Notable for the following:     GFR If Non  52 (*)     All other components within normal limits    Narrative:     Indicate which anticoagulants the patient is on:->NONE   PROTHROMBIN TIME    Narrative:     Indicate which anticoagulants the patient is on:->NONE   APTT    Narrative:     Indicate which anticoagulants the patient is on:->NONE   All labs reviewed by me.        RADIOLOGY  LE ART/MAIN (Specify in Comments Left, Right Or Bilateral)   Final Result      LE ART DUPLX/IMAG (Specify in Comments Left, Right Or Bilateral)   Final Result      CT-HEAD W/O   Final Result      1.  Cerebral atrophy.      2.  White matter lucencies most consistent with small vessel ischemic change versus demyelination or gliosis.      3.  Otherwise, Head CT without contrast with no acute findings. No evidence of acute cerebral infarction, hemorrhage or mass lesion.      CT-HIP W/O PLUS RECONS LEFT    (Results Pending)   The radiologist's interpretation of all radiological studies have been reviewed by me.        COURSE & MEDICAL DECISION MAKING  Pertinent Labs & Imaging studies reviewed. (See chart for details)    11:04 AM Obtained and reviewed past medical records which indicated the patient was seen last night for similar complaints.  She was discharged home after her DX hip was negative. She did not have a CT head evaluation at that time.     11:10 AM  - Patient seen and examined at bedside. Ordered CT head, PTT, APTT, CBC and CMP to evaluate her symptoms.     12:54 PM Reviewed the patient's diagnostic results which indicated peripheral vascular disease. Ordered MAIN for additional evaluation.     1:15 PM Consulted with Dr. Beauchamp, hospitalist, who agreed to admit the patient.     2:22 PM Paged vascular surgery.     2:40 PM Patient reevaluated at bedside. The patient is resting comfortably at this time. Discussed diagnostic results with the patient in addition to plan of care. She agreed to admission.     2:47 PM Consult with Dr. Casey, vascular surgery, who agreed to see the patient.       Decision Making:  This is a 85 y.o. year old female who presents with left leg heaviness and inability to walk.  She is been seen here last night for the same.  Given that she has recurrent instability and falls I am concerned for her overall safety.  Differential includes but is not limited to peripheral vascular disease, occult hip fracture.  Vascular study was performed and she does have evidence of peripheral vascular disease in the left leg.  She does have collaterals and her MAIN is impaired but not indicative of acute ischemia.  She does not have any evidence of stroke on CT scan.  Her labs otherwise unremarkable.  Given she is unable to ambulate she will be admitted for further evaluation and workup.  I spoke with Dr. Beauchamp who agrees to admit.  Vascular surgery will consult as well.      DISPOSITION:  Patient will be admitted to Dr. Beauchamp in guarded condition.      FINAL IMPRESSION  1. Left leg weakness    2. Peripheral vascular disease (HCC)           This dictation has been created using voice recognition software and/or scribes. The accuracy of the dictation is limited by the abilities of the software and the expertise of the scribes. I  expect there may be some errors of grammar and possibly content. I made every attempt to manually correct the errors within my dictation. However, errors related to voice recognition software and/or scribes may still exist and should be interpreted within the appropriate context.      I, Maryanne Beard (Scribe), am scribing for, and in the presence of, Janae Mcnamara M.D..  Electronically signed by: Maryanne Beard (Aden), 6/11/2018  I, Janae Mcnamara M.D. personally performed the services described in this documentation, as scribed by Maryanne Beard in my presence, and it is both accurate and complete.    The note accurately reflects work and decisions made by me.  Janae Mcnamara  6/11/2018  5:53 PM

## 2018-06-11 NOTE — PROGRESS NOTES
Med rec complete per pt , son ,and [facility].  Per pt she is no longer on Hydralazine. Doctor has taken her off.   Per pt she is only taking Prednisone once a day because she felt gaining weight from taking it.  Allergies reviewed and updated.

## 2018-06-11 NOTE — DISCHARGE INSTRUCTIONS
Fall Prevention in the Home  Introduction  Falls can cause injuries. They can happen to people of all ages. There are many things you can do to make your home safe and to help prevent falls.  What can I do on the outside of my home?  · Regularly fix the edges of walkways and driveways and fix any cracks.  · Remove anything that might make you trip as you walk through a door, such as a raised step or threshold.  · Trim any bushes or trees on the path to your home.  · Use bright outdoor lighting.  · Clear any walking paths of anything that might make someone trip, such as rocks or tools.  · Regularly check to see if handrails are loose or broken. Make sure that both sides of any steps have handrails.  · Any raised decks and porches should have guardrails on the edges.  · Have any leaves, snow, or ice cleared regularly.  · Use sand or salt on walking paths during winter.  · Clean up any spills in your garage right away. This includes oil or grease spills.  What can I do in the bathroom?  · Use night lights.  · Install grab bars by the toilet and in the tub and shower. Do not use towel bars as grab bars.  · Use non-skid mats or decals in the tub or shower.  · If you need to sit down in the shower, use a plastic, non-slip stool.  · Keep the floor dry. Clean up any water that spills on the floor as soon as it happens.  · Remove soap buildup in the tub or shower regularly.  · Attach bath mats securely with double-sided non-slip rug tape.  · Do not have throw rugs and other things on the floor that can make you trip.  What can I do in the bedroom?  · Use night lights.  · Make sure that you have a light by your bed that is easy to reach.  · Do not use any sheets or blankets that are too big for your bed. They should not hang down onto the floor.  · Have a firm chair that has side arms. You can use this for support while you get dressed.  · Do not have throw rugs and other things on the floor that can make you trip.  What can  I do in the kitchen?  · Clean up any spills right away.  · Avoid walking on wet floors.  · Keep items that you use a lot in easy-to-reach places.  · If you need to reach something above you, use a strong step stool that has a grab bar.  · Keep electrical cords out of the way.  · Do not use floor polish or wax that makes floors slippery. If you must use wax, use non-skid floor wax.  · Do not have throw rugs and other things on the floor that can make you trip.  What can I do with my stairs?  · Do not leave any items on the stairs.  · Make sure that there are handrails on both sides of the stairs and use them. Fix handrails that are broken or loose. Make sure that handrails are as long as the stairways.  · Check any carpeting to make sure that it is firmly attached to the stairs. Fix any carpet that is loose or worn.  · Avoid having throw rugs at the top or bottom of the stairs. If you do have throw rugs, attach them to the floor with carpet tape.  · Make sure that you have a light switch at the top of the stairs and the bottom of the stairs. If you do not have them, ask someone to add them for you.  What else can I do to help prevent falls?  · Wear shoes that:  ¨ Do not have high heels.  ¨ Have rubber bottoms.  ¨ Are comfortable and fit you well.  ¨ Are closed at the toe. Do not wear sandals.  · If you use a stepladder:  ¨ Make sure that it is fully opened. Do not climb a closed stepladder.  ¨ Make sure that both sides of the stepladder are locked into place.  ¨ Ask someone to hold it for you, if possible.  · Clearly vicenta and make sure that you can see:  ¨ Any grab bars or handrails.  ¨ First and last steps.  ¨ Where the edge of each step is.  · Use tools that help you move around (mobility aids) if they are needed. These include:  ¨ Canes.  ¨ Walkers.  ¨ Scooters.  ¨ Crutches.  · Turn on the lights when you go into a dark area. Replace any light bulbs as soon as they burn out.  · Set up your furniture so you have a  clear path. Avoid moving your furniture around.  · If any of your floors are uneven, fix them.  · If there are any pets around you, be aware of where they are.  · Review your medicines with your doctor. Some medicines can make you feel dizzy. This can increase your chance of falling.  Ask your doctor what other things that you can do to help prevent falls.  This information is not intended to replace advice given to you by your health care provider. Make sure you discuss any questions you have with your health care provider.  Document Released: 10/14/2010 Document Revised: 05/25/2017 Document Reviewed: 01/22/2016  © 2017 [employer]

## 2018-06-11 NOTE — ED TRIAGE NOTES
"Chief Complaint   Patient presents with   • T-5000 GLF     reports \"heaviness\" in LT side causing her to fall over. denies pain from the fall. denies LOC. denies dizziness prior to event. A&Ox4. reporting continued \"heaviness\" on her LT sife (leg, hip & buttock). upper and lower bilateral extremities even and equal in strength.     BIB EMS from home for above. States it was a \"pull to the left\".   Reports similar issue over a year ago. States she had crystals in her ear causing vertigo and falling.   VSS. NAD noted. Changed into gown, connected to monitor. PIV in place. Blood drawn and sent to lab.  BS: 135.   Chart up for ERP.     /62   Pulse 71   Temp 36.6 °C (97.8 °F)   Resp 16   Ht 1.626 m (5' 4\")   Wt 77 kg (169 lb 12.1 oz)   SpO2 95%   BMI 29.14 kg/m²     "

## 2018-06-11 NOTE — ED PROVIDER NOTES
"ER Provider Note     Scribed for Jerrod Nava M.D. by Abdullahi Nettles. 6/10/2018, 8:28 PM.    Primary Care Provider: Kirill Friedman M.D.  Means of Arrival: EMS   History obtained from: Patient  History limited by: None     CHIEF COMPLAINT  Chief Complaint   Patient presents with   • T-5000 GLF     reports \"heaviness\" in LT side causing her to fall over. denies pain from the fall. denies LOC. denies dizziness prior to event. A&Ox4. reporting continued \"heaviness\" on her LT sife (leg, hip & buttock). upper and lower bilateral extremities even and equal in strength.       HPI  Felipa Loyd is a 85 y.o. female who presents to the Emergency Department via EMS after a ground level fall just prior to arrival. The patient reports she woke up and felt \"heaviness\" to her left side. She used her walker to walk her dog when she fell on the side walk outside this afternoon. Patient denies dizziness prior to the fall. As she went back into her apartment she reports she \"slipped and fell\" before calling EMS. Upon exam, she confirms she still feels the \"heaviness\" to her left thigh. She reports at baseline her right leg is stronger then her left for several years now. \"It feels like a 10 lb bag down on my left side\". Denies any new weakness/numbness to her extremities. The patient presents with associated left thigh and left hip pain. She denies chest pain, shortness of breath, loss of sensation, head trauma, and loss of consciousness.     REVIEW OF SYSTEMS  See HPI for further details. All other systems are negative. C.     PAST MEDICAL HISTORY   has a past medical history of Arthritis; Cholelithiasis (1/6/2015); Hyperlipidemia (1/6/2015); and Hypertension.    SURGICAL HISTORY   has a past surgical history that includes abdominal hysterectomy total.    SOCIAL HISTORY  Social History   Substance Use Topics   • Smoking status: Former Smoker     Packs/day: 2.50     Years: 15.00     Quit date: 1/6/1962   • Smokeless " "tobacco: Never Used      Comment: age 30   • Alcohol use 0.0 oz/week      History   Drug Use No       FAMILY HISTORY  Family History   Problem Relation Age of Onset   • Thyroid Mother    • Heart Attack Father      MI at age 65   • Hypertension Sister    • Heart Disease Sister      Stent   • Alzheimer's Disease Brother    • Diabetes Sister    • Heart Disease Sister      rheumatic heart disease   • Arthritis Sister    • Alzheimer's Disease Brother        CURRENT MEDICATIONS  Home Medications     Reviewed by Darshan Cuenca R.N. (Registered Nurse) on 06/10/18 at 2024  Med List Status: Partial   Medication Last Dose Status   acetaminophen (TYLENOL) 500 MG Tab  Active   amlodipine (NORVASC) 5 MG Tab  Active   Cholecalciferol (VITAMIN D-3 PO)  Active   cloNIDine (CATAPRES) 0.1 MG Tab  Active   hydroCHLOROthiazide (HYDRODIURIL) 25 MG Tab  Active   levothyroxine (SYNTHROID) 100 MCG Tab  Active   losartan (COZAAR) 100 MG Tab  Active   NON SPECIFIED  Active   predniSONE (DELTASONE) 10 MG Tab  Active                ALLERGIES  Allergies   Allergen Reactions   • Lisinopril Diarrhea, Nausea and Cough     GI problems, lost control   • Losartan Nausea and Cough   • Pcn [Penicillins] Hives     Reaction in 1967       PHYSICAL EXAM  VITAL SIGNS: /62   Pulse 66   Temp 36.6 °C (97.8 °F)   Resp 16   Ht 1.626 m (5' 4\")   Wt 77 kg (169 lb 12.1 oz)   SpO2 96%   BMI 29.14 kg/m²    Constitutional: Alert in no apparent distress.  HENT: No signs of trauma, Bilateral external ears normal, Nose normal.   Eyes: Pupils are equal and reactive, Conjunctiva normal, Non-icteric.   Neck: Normal range of motion, No tenderness, Supple, No stridor.   Lymphatic: No lymphadenopathy noted.   Cardiovascular: Regular rate and rhythm, no murmurs.   Thorax & Lungs: Normal breath sounds, No respiratory distress, No wheezing, No chest tenderness.   Abdomen: Bowel sounds normal, Soft, No tenderness, No masses, No pulsatile masses. No peritoneal " signs.  Skin: Warm, Dry, No erythema, No rash. Abrasion on left arm.   Back: No bony tenderness, No CVA tenderness.   Extremities: Intact distal pulses, No edema, No tenderness, No cyanosis.  Musculoskeletal: Good range of motion in all major joints. No major deformities noted. Mildly tender over the left hip. 5/5 strength in flexion of the hip, knee, and ankle. 5- on the left.  Neurologic: Alert , Normal motor function, Normal sensory function, No focal deficits noted.   Psychiatric: Affect normal, Judgment normal, Mood normal.     DIAGNOSTIC STUDIES / PROCEDURES    LABS  Labs Reviewed   BASIC METABOLIC PANEL - Abnormal; Notable for the following:        Result Value    Glucose 132 (*)     Bun 30 (*)     All other components within normal limits   ESTIMATED GFR - Abnormal; Notable for the following:     GFR If Non  53 (*)     All other components within normal limits     All labs reviewed by me.    RADIOLOGY  DX-HIP-COMPLETE - UNILATERAL 2+ LEFT   Final Result         1.  No radiographic evidence of acute traumatic injury.   2.  Degenerative changes of the bilateral hips, severe on the left.   3.  Atherosclerosis         The radiologist's interpretation of all radiological studies have been reviewed by me.    COURSE & MEDICAL DECISION MAKING  Pertinent Labs & Imaging studies reviewed. (See chart for details)    This is a 85 y.o. female that presents with what appears to be a mechanical fall onto her left side.  The patient has no neurologic deficits at this time.  The patient has some mild tenderness over her left hip.  The report of heaviness could be electrolyte derangement as well.  I will get a BMP to evaluate for electrolyte derangement.    8:28 PM - Patient seen and examined at bedside. Ordered Dx- Hip Bilateral with Pelvis 2 Views and BMP.     10:16 PM - The patient is able to ambulate well and reports feeling improved. I updated her on her results, which did not indicate radiographic  evidence of an acute traumatic injury. I explained that she is now stable for discharge. I advised her to follow up with her primary care provider and to return to the ED for new or worsening symptoms. She understands and will comply.     The patient has no elect light derangements.  The patient has no fracture of her left hip.  The patient able to ambulate without any difficulty.  Strict return precautions were given and follow-up was arranged.    The patient is referred to a primary physician for blood pressure management, diabetic screening, and for all other preventative health concerns.    DISPOSITION:  Patient will be discharged home in stable condition.    FOLLOW UP:  Kirill Friedman M.D.  [address] [place] [address] [address] [place] [id number]  [phone]    In 2 days      OUTPATIENT MEDICATIONS:  Discharge Medication List as of 6/10/2018 10:24 PM          FINAL IMPRESSION  1. Fall, initial encounter          Abdullahi GUNTER (Scribe), am scribing for, and in the presence of, Jerrod Nava M.D..    Electronically signed by: Abdullahi Nettles ([email or web address]), 6/10/2018    IJerrod M.D. personally performed the services described in this documentation, as scribed by Abdullahi Nettles in my presence, and it is both accurate and complete.     The note accurately reflects work and decisions made by me.  Jerrod Nava  6/11/2018  2:45 AM

## 2018-06-11 NOTE — ED NOTES
Pt brought in by [facility] from son's home. Per EMS pt had another GLF today. Pt was here yesterday for same, c/o L sided weakness x several months. Pt normally lives at home alone but went home with son last night d/t multiple falls. Per EMS son is concerned that they can't care for her.     Pt a/o x4, speaking in full sentences.

## 2018-06-11 NOTE — ED NOTES
Pt ambulatory with walker  Vital signs stable  Pt handed d/c paperwork with understanding stated  Pt states will follow up with PCP  Pt states safe way home

## 2018-06-12 PROBLEM — R33.9 URINARY RETENTION: Status: ACTIVE | Noted: 2018-06-12

## 2018-06-12 LAB
25(OH)D3 SERPL-MCNC: 32 NG/ML (ref 30–100)
ANION GAP SERPL CALC-SCNC: 7 MMOL/L (ref 0–11.9)
APPEARANCE UR: CLEAR
BASOPHILS # BLD AUTO: 0.4 % (ref 0–1.8)
BASOPHILS # BLD: 0.03 K/UL (ref 0–0.12)
BILIRUB UR QL STRIP.AUTO: NEGATIVE
BUN SERPL-MCNC: 30 MG/DL (ref 8–22)
CALCIUM SERPL-MCNC: 9.2 MG/DL (ref 8.5–10.5)
CHLORIDE SERPL-SCNC: 105 MMOL/L (ref 96–112)
CO2 SERPL-SCNC: 25 MMOL/L (ref 20–33)
COLOR UR: YELLOW
CREAT SERPL-MCNC: 1.11 MG/DL (ref 0.5–1.4)
EOSINOPHIL # BLD AUTO: 0.04 K/UL (ref 0–0.51)
EOSINOPHIL NFR BLD: 0.5 % (ref 0–6.9)
ERYTHROCYTE [DISTWIDTH] IN BLOOD BY AUTOMATED COUNT: 46.5 FL (ref 35.9–50)
GLUCOSE SERPL-MCNC: 88 MG/DL (ref 65–99)
GLUCOSE UR STRIP.AUTO-MCNC: NEGATIVE MG/DL
HCT VFR BLD AUTO: 42.1 % (ref 37–47)
HGB BLD-MCNC: 14.2 G/DL (ref 12–16)
IMM GRANULOCYTES # BLD AUTO: 0.04 K/UL (ref 0–0.11)
IMM GRANULOCYTES NFR BLD AUTO: 0.5 % (ref 0–0.9)
KETONES UR STRIP.AUTO-MCNC: NEGATIVE MG/DL
LEUKOCYTE ESTERASE UR QL STRIP.AUTO: NEGATIVE
LYMPHOCYTES # BLD AUTO: 2.23 K/UL (ref 1–4.8)
LYMPHOCYTES NFR BLD: 29.6 % (ref 22–41)
MAGNESIUM SERPL-MCNC: 2.1 MG/DL (ref 1.5–2.5)
MCH RBC QN AUTO: 31.3 PG (ref 27–33)
MCHC RBC AUTO-ENTMCNC: 33.7 G/DL (ref 33.6–35)
MCV RBC AUTO: 92.9 FL (ref 81.4–97.8)
MICRO URNS: NORMAL
MONOCYTES # BLD AUTO: 0.64 K/UL (ref 0–0.85)
MONOCYTES NFR BLD AUTO: 8.5 % (ref 0–13.4)
NEUTROPHILS # BLD AUTO: 4.56 K/UL (ref 2–7.15)
NEUTROPHILS NFR BLD: 60.5 % (ref 44–72)
NITRITE UR QL STRIP.AUTO: NEGATIVE
NRBC # BLD AUTO: 0 K/UL
NRBC BLD-RTO: 0 /100 WBC
PH UR STRIP.AUTO: 6.5 [PH]
PLATELET # BLD AUTO: 177 K/UL (ref 164–446)
PMV BLD AUTO: 8.8 FL (ref 9–12.9)
POTASSIUM SERPL-SCNC: 3.8 MMOL/L (ref 3.6–5.5)
PROT UR QL STRIP: NEGATIVE MG/DL
RBC # BLD AUTO: 4.53 M/UL (ref 4.2–5.4)
RBC UR QL AUTO: NEGATIVE
SODIUM SERPL-SCNC: 137 MMOL/L (ref 135–145)
SP GR UR STRIP.AUTO: 1.01
UROBILINOGEN UR STRIP.AUTO-MCNC: 0.2 MG/DL
VIT B12 SERPL-MCNC: 327 PG/ML (ref 211–911)
WBC # BLD AUTO: 7.5 K/UL (ref 4.8–10.8)

## 2018-06-12 PROCEDURE — 770006 HCHG ROOM/CARE - MED/SURG/GYN SEMI*

## 2018-06-12 PROCEDURE — 700102 HCHG RX REV CODE 250 W/ 637 OVERRIDE(OP): Performed by: HOSPITALIST

## 2018-06-12 PROCEDURE — 82306 VITAMIN D 25 HYDROXY: CPT

## 2018-06-12 PROCEDURE — 36415 COLL VENOUS BLD VENIPUNCTURE: CPT

## 2018-06-12 PROCEDURE — 81003 URINALYSIS AUTO W/O SCOPE: CPT

## 2018-06-12 PROCEDURE — G8979 MOBILITY GOAL STATUS: HCPCS | Mod: CJ

## 2018-06-12 PROCEDURE — 80048 BASIC METABOLIC PNL TOTAL CA: CPT

## 2018-06-12 PROCEDURE — 97162 PT EVAL MOD COMPLEX 30 MIN: CPT

## 2018-06-12 PROCEDURE — 99232 SBSQ HOSP IP/OBS MODERATE 35: CPT | Performed by: FAMILY MEDICINE

## 2018-06-12 PROCEDURE — 82607 VITAMIN B-12: CPT

## 2018-06-12 PROCEDURE — G8978 MOBILITY CURRENT STATUS: HCPCS | Mod: CM

## 2018-06-12 PROCEDURE — 700111 HCHG RX REV CODE 636 W/ 250 OVERRIDE (IP): Performed by: HOSPITALIST

## 2018-06-12 PROCEDURE — 51798 US URINE CAPACITY MEASURE: CPT

## 2018-06-12 PROCEDURE — 85025 COMPLETE CBC W/AUTO DIFF WBC: CPT

## 2018-06-12 PROCEDURE — 83735 ASSAY OF MAGNESIUM: CPT

## 2018-06-12 PROCEDURE — A9270 NON-COVERED ITEM OR SERVICE: HCPCS | Performed by: HOSPITALIST

## 2018-06-12 RX ADMIN — AMLODIPINE BESYLATE 5 MG: 5 TABLET ORAL at 09:37

## 2018-06-12 RX ADMIN — STANDARDIZED SENNA CONCENTRATE AND DOCUSATE SODIUM 2 TABLET: 8.6; 5 TABLET, FILM COATED ORAL at 20:31

## 2018-06-12 RX ADMIN — PREDNISONE 10 MG: 20 TABLET ORAL at 09:37

## 2018-06-12 RX ADMIN — STANDARDIZED SENNA CONCENTRATE AND DOCUSATE SODIUM 2 TABLET: 8.6; 5 TABLET, FILM COATED ORAL at 11:40

## 2018-06-12 RX ADMIN — LOSARTAN POTASSIUM 100 MG: 50 TABLET ORAL at 09:37

## 2018-06-12 RX ADMIN — AMLODIPINE BESYLATE 5 MG: 5 TABLET ORAL at 20:30

## 2018-06-12 RX ADMIN — LEVOTHYROXINE SODIUM 100 MCG: 100 TABLET ORAL at 05:40

## 2018-06-12 RX ADMIN — ENOXAPARIN SODIUM 40 MG: 100 INJECTION SUBCUTANEOUS at 09:36

## 2018-06-12 RX ADMIN — ASPIRIN 81 MG: 81 TABLET, COATED ORAL at 09:37

## 2018-06-12 RX ADMIN — ATORVASTATIN CALCIUM 20 MG: 20 TABLET, FILM COATED ORAL at 20:31

## 2018-06-12 ASSESSMENT — COGNITIVE AND FUNCTIONAL STATUS - GENERAL
SUGGESTED CMS G CODE MODIFIER MOBILITY: CM
MOVING FROM LYING ON BACK TO SITTING ON SIDE OF FLAT BED: UNABLE
STANDING UP FROM CHAIR USING ARMS: A LOT
MOBILITY SCORE: 8
TURNING FROM BACK TO SIDE WHILE IN FLAT BAD: UNABLE
MOVING TO AND FROM BED TO CHAIR: UNABLE
WALKING IN HOSPITAL ROOM: A LOT
CLIMB 3 TO 5 STEPS WITH RAILING: TOTAL

## 2018-06-12 ASSESSMENT — GAIT ASSESSMENTS: GAIT LEVEL OF ASSIST: UNABLE TO PARTICIPATE

## 2018-06-12 NOTE — ASSESSMENT & PLAN NOTE
- CT (-) fracture - no interventions at this time  - prn analgesics - minimize due to increased confusion/delirium potential. Hold for respiratory depression  - PT/OT  - plan for SNF today or tomorrow

## 2018-06-12 NOTE — PROGRESS NOTES
[facility] Hospitalist Progress Note    Date of Service: 2018    Chief Complaint  85 y.o. female admitted 2018 with ground level fall.   Was just seen in ED on 6/10 for same.    Interval Problem Update  - mentation fluctuates. Oriented to self  - CMS fluctuates - at times she is noted to move her right side independently, but when you ask her to move her RUE/RLE, she doesn't.   - LLE cool/pale with no palpable pulse. (+) popliteal occlusion  - urinary retention - bladder scan and I/O cath  - VSS in no acute distress    Consultants/Specialty  - Vascular surgery - Dr. Casey    Disposition  Undetermined at this time.         Review of Systems   Unable to perform ROS: Mental acuity      Physical Exam  Laboratory/Imaging   Hemodynamics  Temp (24hrs), Av.6 °C (97.8 °F), Min:36.3 °C (97.4 °F), Max:36.9 °C (98.4 °F)   Temperature: 36.9 °C (98.4 °F)  Pulse  Av.9  Min: 61  Max: 84    Blood Pressure : 143/64      Respiratory      Respiration: 16, Pulse Oximetry: 94 %             Fluids    Intake/Output Summary (Last 24 hours) at 18 1635  Last data filed at 18 0500   Gross per 24 hour   Intake                0 ml   Output              200 ml   Net             -200 ml       Nutrition  Orders Placed This Encounter   Procedures   • Diet Order     Standing Status:   Standing     Number of Occurrences:   1     Order Specific Question:   Diet:     Answer:   Regular [1]     Physical Exam   Constitutional: Vital signs are normal. She appears well-developed. She is uncooperative. She has a sickly appearance. She appears distressed.   HENT:   Head: Normocephalic.   Right Ear: Hearing normal.   Left Ear: Hearing normal.   Nose: Nose normal.   Mouth/Throat: Oropharynx is clear and moist and mucous membranes are normal.   Eyes: Conjunctivae and lids are normal. No scleral icterus.   Neck: Phonation normal. No JVD present.   Cardiovascular: Normal rate and intact distal pulses.  Exam reveals distant heart sounds  and decreased pulses.    Pulses:       Dorsalis pedis pulses are 1+ on the right side, and 0 on the left side.        Posterior tibial pulses are 1+ on the right side, and 0 on the left side.   LLE cool/pale     Pulmonary/Chest: No respiratory distress. She has decreased breath sounds.   Poor inspiratory effort   Abdominal: Soft. Normal appearance and bowel sounds are normal. She exhibits no distension. There is no tenderness. There is no rebound.   Neurological: She is alert.   Fluctuates.  At times her RUE is 1/5, but then she is noted to move it independently.  RLE 1-2/5  LUE 5/5  LLE 2-3/5   Skin: Skin is warm and dry. No rash noted.   Psychiatric: Her behavior is normal. Cognition and memory are impaired. She exhibits abnormal recent memory and abnormal remote memory.   Nursing note and vitals reviewed.      Recent Labs      06/11/18   1205  06/12/18   0857   WBC  9.5  7.5   RBC  4.68  4.53   HEMOGLOBIN  14.5  14.2   HEMATOCRIT  44.2  42.1   MCV  94.4  92.9   MCH  31.0  31.3   MCHC  32.8*  33.7   RDW  46.8  46.5   PLATELETCT  181  177   MPV  8.7*  8.8*     Recent Labs      06/10/18   2118  06/11/18   1205  06/12/18   0857   SODIUM  136  138  137   POTASSIUM  3.6  4.1  3.8   CHLORIDE  104  105  105   CO2  23  22  25   GLUCOSE  132*  105*  88   BUN  30*  25*  30*   CREATININE  1.00  1.01  1.11   CALCIUM  9.3  9.8  9.2     Recent Labs      06/11/18   1205   APTT  27.9   INR  0.93                  Assessment/Plan     * Arterial insufficiency of lower extremity (HCC)- (present on admission)   Assessment & Plan    - LEFT  - US showed findings consistent with severe arterial insufficiency of the left lower extremity.   - Right MAIN 0.9-1.0 and Left MAIN 0.5-0.89  - Vascular surgery - Dr. Casey - following. No plan for surgery at this time.   - continue ASA and statin          Pain of left hip joint- (present on admission)   Assessment & Plan    - CT (-) fracture - no interventions at this time  - prn analgesics -  minimize due to increased confusion/delirium potential. Hold for respiratory depression  - PT/OT  - referral for SNF placed          Essential hypertension- (present on admission)   Assessment & Plan    - chronic  - continue home meds        Dyslipidemia- (present on admission)   Assessment & Plan    - chronic  - statin        Stage 3 chronic kidney disease- (present on admission)   Assessment & Plan    - chronic  - labs currently at her baseline  - follow labs        Acquired hypothyroidism- (present on admission)   Assessment & Plan    - chronic  - continue levothyroxine        Urinary retention   Assessment & Plan    - unknown etiology at this time  - bladder scan Q6 hours and straight cath for >500 mL          Quality-Core Measures   Reviewed items::  EKG reviewed, Labs reviewed, Medications reviewed and Radiology images reviewed  Levy catheter::  No Levy  DVT prophylaxis pharmacological::  Enoxaparin (Lovenox)  DVT prophylaxis - mechanical:  SCDs  Ulcer Prophylaxis::  Not indicated  Assessed for rehabilitation services:  Patient was assess for and/or received rehabilitation services during this hospitalization      JUAN CARLOS Medina.

## 2018-06-12 NOTE — H&P
" Hospital Medicine History and Physical    Date of Service  6/11/2018    Chief Complaint  Chief Complaint   Patient presents with   • GLF       History of Presenting Illness  85 y.o. female who presented 6/11/2018 with right leg weakness and pain. Ms. Loyd a history of hypertension, dyslipidemia, and chronic left hip pain that presented to the emergency room on Angelique 10 after having a ground-level fall.  She had a workup that was negative was discharged home.  Today she came back and states \"I cannot get around\" she states that her left leg feels \"heavy\" and she is unable to bear weight on it appropriately.  Has bruise on her left eye on left forearm from her previous fall.  Emergency room, it is noted that she does not have palpable pulses on left leg therefore she had an arterial ultrasound which reveals arterial insufficiency L she will be admitted for further workup including vascular surgery consultation.  She states that she lives alone and she always uses a walker.  Primary Care Physician  Kirill Friedman M.D.    Consultants  [facility]    Code Status  full    Review of Systems  Review of Systems   Constitutional: Negative for fever.   Respiratory: Negative for shortness of breath.    Cardiovascular: Negative for chest pain.   Musculoskeletal: Positive for falls and joint pain.   All other systems reviewed and are negative.       Past Medical History  Past Medical History:   Diagnosis Date   • Hyperlipidemia 1/6/2015   • Cholelithiasis 1/6/2015   • Arthritis    • Hypertension        Surgical History  Past Surgical History:   Procedure Laterality Date   • ABDOMINAL HYSTERECTOMY TOTAL         Medications  No current facility-administered medications on file prior to encounter.      Current Outpatient Prescriptions on File Prior to Encounter   Medication Sig Dispense Refill   • hydroCHLOROthiazide (HYDRODIURIL) 25 MG Tab Take 1 Tab by mouth every day. 90 Tab 3   • losartan (COZAAR) 100 MG Tab Take 1 Tab by " mouth every day. 90 Tab 3   • levothyroxine (SYNTHROID) 100 MCG Tab TAKE 1 TAB BY MOUTH EVERY MORNING BEFORE BREAKFAST. 90 Tab 3   • amlodipine (NORVASC) 5 MG Tab TAKE 1 TAB BY MOUTH 2 TIMES A DAY. 180 Tab 3       Family History  Family History   Problem Relation Age of Onset   • Thyroid Mother    • Heart Attack Father      MI at age 65   • Hypertension Sister    • Heart Disease Sister      Stent   • Alzheimer's Disease Brother    • Diabetes Sister    • Heart Disease Sister      rheumatic heart disease   • Arthritis Sister    • Alzheimer's Disease Brother        Social History  Social History   Substance Use Topics   • Smoking status: Former Smoker     Packs/day: 2.50     Years: 15.00     Quit date: 1962   • Smokeless tobacco: Never Used      Comment: age 30   • Alcohol use 0.0 oz/week   she lives alone    Allergies  Allergies   Allergen Reactions   • Lisinopril Diarrhea, Nausea and Cough     GI problems, lost control   • Pcn [Penicillins] Hives     Reaction in         Physical Exam  Laboratory   Hemodynamics  Temp (24hrs), Av.4 °C (97.5 °F), Min:36.2 °C (97.2 °F), Max:36.6 °C (97.8 °F)   Temperature: 36.6 °C (97.8 °F)  Pulse  Av  Min: 61  Max: 84    Blood Pressure : 158/55, NIBP: 149/59      Respiratory      Respiration: 16, Pulse Oximetry: 98 %             Physical Exam   Constitutional: She is oriented to person, place, and time.   Hard of hearing   HENT:   Left eye ecchymosis.    Neck: Neck supple.   Cardiovascular: Normal rate and regular rhythm.    No murmur heard.  Pulmonary/Chest: Effort normal and breath sounds normal. No respiratory distress. She has no wheezes.   Abdominal: Soft. She exhibits no distension. There is no tenderness.   Musculoskeletal: She exhibits no edema or tenderness.   No palpable pulse left foot.   She cannot get her left foot off the bed. Markedly decreased range of motion in a passive fashion due to pain.    Neurological: She is alert and oriented to person, place,  and time.   Skin: Skin is warm and dry.   Bruises left forearm.    Psychiatric: She has a normal mood and affect.   Nursing note and vitals reviewed.      Recent Labs      06/11/18   1205   WBC  9.5   RBC  4.68   HEMOGLOBIN  14.5   HEMATOCRIT  44.2   MCV  94.4   MCH  31.0   MCHC  32.8*   RDW  46.8   PLATELETCT  181   MPV  8.7*     Recent Labs      06/10/18   2118  06/11/18   1205   SODIUM  136  138   POTASSIUM  3.6  4.1   CHLORIDE  104  105   CO2  23  22   GLUCOSE  132*  105*   BUN  30*  25*   CREATININE  1.00  1.01   CALCIUM  9.3  9.8     Recent Labs      06/10/18   2118  06/11/18   1205   ALTSGPT   --   21   ASTSGOT   --   21   ALKPHOSPHAT   --   44   TBILIRUBIN   --   1.2   GLUCOSE  132*  105*     Recent Labs      06/11/18   1205   APTT  27.9   INR  0.93             Lab Results   Component Value Date    TROPONINI 0.05 (H) 08/02/2017     Urinalysis:    Lab Results  Component Value Date/Time   SPECGRAVITY 1.026 02/09/2018 1234   GLUCOSEUR Negative 02/09/2018 1234   KETONES Negative 02/09/2018 1234   NITRITE Negative 02/09/2018 1234   WBCURINE 2-5 02/09/2018 1234   RBCURINE 2-5 (A) 02/09/2018 1234   BACTERIA Negative 02/09/2018 1234   EPITHELCELL Moderate (A) 02/09/2018 1234        Imaging  CT-HIP W/O PLUS RECONS LEFT   Final Result      1.  No acute left hip fracture or dislocation.      2.  Severe degenerative change of the left hip.      LE ART/MAIN (Specify in Comments Left, Right Or Bilateral)   Final Result      LE ART DUPLX/IMAG (Specify in Comments Left, Right Or Bilateral)   Final Result      CT-HEAD W/O   Final Result      1.  Cerebral atrophy.      2.  White matter lucencies most consistent with small vessel ischemic change versus demyelination or gliosis.      3.  Otherwise, Head CT without contrast with no acute findings. No evidence of acute cerebral infarction, hemorrhage or mass lesion.           Assessment/Plan     I anticipate this patient will require at least two midnights for appropriate medical  management, necessitating inpatient admission.    * Arterial insufficiency of lower extremity (HCC)- (present on admission)   Assessment & Plan    Noted on arterial ultrasound.  Vascular surgery, Dr. Casey has consulted.   Aspirin and statin.         Pain of left hip joint- (present on admission)   Assessment & Plan    CT is negative for fracture.  Oral and IV pain medications  PT/OT  She may need a consult from ortho at [facility] where she has had injections before.         Essential hypertension- (present on admission)   Assessment & Plan    Continue outpatient regimen.         Dyslipidemia- (present on admission)   Assessment & Plan    Her last LDL was 139 on 1/17  Given her arterial insufficiency, a statin will be initiated.         Acquired hypothyroidism- (present on admission)   Assessment & Plan    Continue synthroid 100 mcg daily            VTE prophylaxis: lovenox.

## 2018-06-12 NOTE — CARE PLAN
Problem: Safety  Goal: Will remain free from injury  Outcome: PROGRESSING AS EXPECTED  Bed alarm in place    Problem: Skin Integrity  Goal: Risk for impaired skin integrity will decrease  Outcome: PROGRESSING AS EXPECTED  q 2hr turn

## 2018-06-12 NOTE — CONSULTS
Vascular Consult Note:    Sylvia Casey  Date & Time note created:    6/11/2018   5:16 PM     Referred by: Janae Mcnamara MD    Patient ID:   Name:             Felipa Loyd   YOB: 1932  Age:                 85 y.o.  female   MRN:               [id number]                                                             Reason for Consult:      Recent falls    History of Present Illness:    Dori Zheng is an 85-year-old woman admitted through the emergency room after 2 ground-level fall off, occurring last night as well as this morning. She's been walking with a walker for the past couple years and, went to the emergency room last night after the 1st fall and was discharged home. She returned describing intermittent heaviness in the left leg as well as weakness, in addition to an overall feeling of unsteadiness. She denies any left arm weakness or numbness. She has no history of stroke, coronary artery disease or nonhealing wounds in the left leg.    Left lower extremity arterial duplex imaging demonstrates a left popliteal occlusion with ankle-brachial indices of 0.78.    Review of Systems:      Constitutional: Denies fevers, denies weight changes  Eyes: Denies changes in vision, no eye pain  Ears/Nose/Throat/Mouth: Denies nasal congestion or sore throat   Cardiovascular: Denies chest pain or  palpitations   Respiratory: Denies shortness of breath , Denies cough  Gastrointestinal/Hepatic: Denies abdominal pain, nausea, vomiting, diarrhea, constipation or GI bleeding   Genitourinary: Denies dysuria or frequency  Musculoskeletal/Rheum: Denies  joint pain and swelling, no edema, no pain with walking  Skin: No rash, recent history of open wounds, all superficial and apparently due to her recent falls.  Neurological: Denies headache, confusion, memory loss or focal weakness/parasthesias  Psychiatric: Denies mood disorder   Endocrine: Denies thyroid problems  Heme/Oncology/Lymph Nodes: Denies  enlarged lymph nodes, denies brusing or known bleeding disorder  All other systems were reviewed and are negative (AMA/CMS criteria)                Past Medical History:   Past Medical History:   Diagnosis Date   • Arthritis    • Cholelithiasis 1/6/2015   • Hyperlipidemia 1/6/2015   • Hypertension        Past Surgical History:  Past Surgical History:   Procedure Laterality Date   • ABDOMINAL HYSTERECTOMY TOTAL         Current Outpatient Medications:  Current Facility-Administered Medications   Medication Dose Route Frequency Provider Last Rate Last Dose   • amLODIPine (NORVASC) tablet 5 mg  5 mg Oral BID Daren Beauchamp M.D.       • [START ON 6/12/2018] hydroCHLOROthiazide (HYDRODIURIL) tablet 25 mg  25 mg Oral DAILY Daren Beauchamp M.D.       • [START ON 6/12/2018] levothyroxine (SYNTHROID) tablet 100 mcg  100 mcg Oral QAM AC Daren Beauchamp M.D.       • [START ON 6/12/2018] losartan (COZAAR) tablet 100 mg  100 mg Oral DAILY Daren Beauchamp M.D.       • [START ON 6/12/2018] predniSONE (DELTASONE) tablet 10 mg  10 mg Oral DAILY Daren Beauchamp M.D.       • senna-docusate (PERICOLACE or SENOKOT S) 8.6-50 MG per tablet 2 Tab  2 Tab Oral BID Daren Beauchamp M.D.        And   • polyethylene glycol/lytes (MIRALAX) PACKET 1 Packet  1 Packet Oral QDAY PRN Daren Beauchamp M.D.        And   • magnesium hydroxide (MILK OF MAGNESIA) suspension 30 mL  30 mL Oral QDAY PRN Daren Beauchamp M.D.        And   • bisacodyl (DULCOLAX) suppository 10 mg  10 mg Rectal QDAY PRN Daren Beauchamp M.D.       • [START ON 6/12/2018] enoxaparin (LOVENOX) inj 40 mg  40 mg Subcutaneous DAILY Daren Beauchamp M.D.       • ondansetron (ZOFRAN) syringe/vial injection 4 mg  4 mg Intravenous Q4HRS PRN Daren Beauchamp M.D.       • ondansetron (ZOFRAN ODT) dispertab 4 mg  4 mg Oral Q4HRS PRN Daren Beauchamp M.D.       • acetaminophen (TYLENOL) tablet 650 mg  650 mg Oral Q6HRS PRN Daren Beauchamp M.D.       • oxyCODONE immediate-release (ROXICODONE) tablet 5 mg  5 mg  "Oral Q4HRS PRN Daren Beauchamp M.D.           Medication Allergy:  Allergies   Allergen Reactions   • Lisinopril Diarrhea, Nausea and Cough     GI problems, lost control   • Pcn [Penicillins] Hives     Reaction in 1967       Family History:  Family History   Problem Relation Age of Onset   • Thyroid Mother    • Heart Attack Father      MI at age 65   • Hypertension Sister    • Heart Disease Sister      Stent   • Alzheimer's Disease Brother    • Diabetes Sister    • Heart Disease Sister      rheumatic heart disease   • Arthritis Sister    • Alzheimer's Disease Brother        Social History:  Social History     Social History   • Marital status:      Spouse name: N/A   • Number of children: N/A   • Years of education: N/A     Occupational History   • Not on file.     Social History Main Topics   • Smoking status: Former Smoker     Packs/day: 2.50     Years: 15.00     Quit date: 1/6/1962   • Smokeless tobacco: Never Used      Comment: age 30   • Alcohol use 0.0 oz/week   • Drug use: No   • Sexual activity: No     Other Topics Concern   • Not on file     Social History Narrative   • No narrative on file         Physical Exam:  Vitals/ General Appearance:   Weight/BMI: Body mass index is 28.23 kg/m².  Blood pressure 158/55, pulse 75, temperature 36.6 °C (97.8 °F), resp. rate 16, height 1.626 m (5' 4\"), weight 74.6 kg (164 lb 7.4 oz), SpO2 98 %.  Vitals:    06/11/18 1401 06/11/18 1431 06/11/18 1501 06/11/18 1520   BP:    158/55   Pulse: 62 84 72 75   Resp:    16   Temp:    36.6 °C (97.8 °F)   SpO2: 99% 95% 98% 98%   Weight:    74.6 kg (164 lb 7.4 oz)   Height:         Oxygen Therapy:  Pulse Oximetry: 98 %, O2 (LPM): 0, O2 Delivery: None (Room Air)    Constitutional:   Well developed, Well nourished, No acute distress  HENMT:  Normocephalic, Atraumatic, Oropharynx moist mucous membranes.  Eyes:  EOMI, conjunctiva normal.  Neck:  Normal range of motion, No cervical tenderness,  no JVD.  Cardiovascular:  Normal heart " rate, Normal rhythm, No murmur.   palpable right lower extremity dorsal pedal and posterior tibial pulses. No left lower extremity tibial pulses are palpable.  Lungs:  Breath sounds clear to auscultation bilaterally.   Abdomen: Bowel sounds normal, Soft, No tenderness.   Skin: Warm, Dry, No erythema, No rash, no induration.  Neurologic: Alert & oriented x 3, No focal deficits noted, cranial nerves II through X are grossly intact.  Psychiatric: Affect normal, Judgment normal, Mood normal.    Lab Data Review:  Recent Results (from the past 24 hour(s))   BASIC METABOLIC PANEL    Collection Time: 06/10/18  9:18 PM   Result Value Ref Range    Sodium 136 135 - 145 mmol/L    Potassium 3.6 3.6 - 5.5 mmol/L    Chloride 104 96 - 112 mmol/L    Co2 23 20 - 33 mmol/L    Glucose 132 (H) 65 - 99 mg/dL    Bun 30 (H) 8 - 22 mg/dL    Creatinine 1.00 0.50 - 1.40 mg/dL    Calcium 9.3 8.5 - 10.5 mg/dL    Anion Gap 9.0 0.0 - 11.9   ESTIMATED GFR    Collection Time: 06/10/18  9:18 PM   Result Value Ref Range    GFR If African American >60 >60 mL/min/1.73 m 2    GFR If Non  53 (A) >60 mL/min/1.73 m 2   CBC WITH DIFFERENTIAL    Collection Time: 06/11/18 12:05 PM   Result Value Ref Range    WBC 9.5 4.8 - 10.8 K/uL    RBC 4.68 4.20 - 5.40 M/uL    Hemoglobin 14.5 12.0 - 16.0 g/dL    Hematocrit 44.2 37.0 - 47.0 %    MCV 94.4 81.4 - 97.8 fL    MCH 31.0 27.0 - 33.0 pg    MCHC 32.8 (L) 33.6 - 35.0 g/dL    RDW 46.8 35.9 - 50.0 fL    Platelet Count 181 164 - 446 K/uL    MPV 8.7 (L) 9.0 - 12.9 fL    Neutrophils-Polys 81.80 (H) 44.00 - 72.00 %    Lymphocytes 11.80 (L) 22.00 - 41.00 %    Monocytes 5.30 0.00 - 13.40 %    Eosinophils 0.10 0.00 - 6.90 %    Basophils 0.40 0.00 - 1.80 %    Immature Granulocytes 0.60 0.00 - 0.90 %    Nucleated RBC 0.00 /100 WBC    Neutrophils (Absolute) 7.79 (H) 2.00 - 7.15 K/uL    Lymphs (Absolute) 1.12 1.00 - 4.80 K/uL    Monos (Absolute) 0.50 0.00 - 0.85 K/uL    Eos (Absolute) 0.01 0.00 - 0.51 K/uL    Baso  (Absolute) 0.04 0.00 - 0.12 K/uL    Immature Granulocytes (abs) 0.06 0.00 - 0.11 K/uL    NRBC (Absolute) 0.00 K/uL   COMP METABOLIC PANEL    Collection Time: 06/11/18 12:05 PM   Result Value Ref Range    Sodium 138 135 - 145 mmol/L    Potassium 4.1 3.6 - 5.5 mmol/L    Chloride 105 96 - 112 mmol/L    Co2 22 20 - 33 mmol/L    Anion Gap 11.0 0.0 - 11.9    Glucose 105 (H) 65 - 99 mg/dL    Bun 25 (H) 8 - 22 mg/dL    Creatinine 1.01 0.50 - 1.40 mg/dL    Calcium 9.8 8.5 - 10.5 mg/dL    AST(SGOT) 21 12 - 45 U/L    ALT(SGPT) 21 2 - 50 U/L    Alkaline Phosphatase 44 30 - 99 U/L    Total Bilirubin 1.2 0.1 - 1.5 mg/dL    Albumin 4.2 3.2 - 4.9 g/dL    Total Protein 7.5 6.0 - 8.2 g/dL    Globulin 3.3 1.9 - 3.5 g/dL    A-G Ratio 1.3 g/dL   PROTHROMBIN TIME    Collection Time: 06/11/18 12:05 PM   Result Value Ref Range    PT 12.2 12.0 - 14.6 sec    INR 0.93 0.87 - 1.13   APTT    Collection Time: 06/11/18 12:05 PM   Result Value Ref Range    APTT 27.9 24.7 - 36.0 sec   ESTIMATED GFR    Collection Time: 06/11/18 12:05 PM   Result Value Ref Range    GFR If African American >60 >60 mL/min/1.73 m 2    GFR If Non African American 52 (A) >60 mL/min/1.73 m 2       Imaging/Procedures Review:         RIGHT     Waveform            Systolic BPs (mmHg)                              168           Brachial   Triphasic                                Common Femoral   Triphasic                  216           Posterior Tibial   Triphasic                  193           Dorsalis Pedis                                            Peroneal                              1.13          MAIN                                            TBI                          LEFT   Waveform        Systolic BPs (mmHg)                              192           Brachial   Triphasic                                Common Femoral   Monophasic                 149           Posterior Tibial   Monophasic                 119           Dorsalis Pedis                                             Peroneal                              0.78          MAIN         Left lower extremity arterial imaging exam.                LEFT   Waveform        Peak Systolic Velocity (cm/s)                   Prox    Prox-Mid  Mid    Mid-Dist  Distal   Biphasic                          194                      CFA       Triphasic       133                                        PFA       Biphasic        168               115     86       212     SFA       Absent          0                 0                30      POP       Monophasic      38                        187      32      AT       Monophasic      31                                 54      PT       Monophasic      26                                 0       LILLY    MDM (Assessment and Plan):     Patient Active Problem List    Diagnosis Date Noted   • Essential hypertension 04/18/2015     Priority: High   • Dyslipidemia 01/06/2015     Priority: Medium   • Acquired hypothyroidism 12/20/2014     Priority: Medium   • 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 Zheng is any 85-year-old woman who appears to have a chronic left popliteal artery occlusion. Given her ankle-brachial index of 0.78, it appears to be well compensated and in the absence of open wounds or rest pain, I am very suspect that this is the cause of her instability. It may be related to left sided heaviness, but she complains of heaviness throughout the whole left leg and in the absence of iliac artery occlusion, I hesitate to try and treat a popliteal occlusion. I will await to see what the remainder of her workup concludes but in the meantime, do not plan further diagnostic imaging or treatment of this popliteal occlusion.    Plan:  Observe and continue conservative care.  Consider arteriography pending further work-up.    Sylvia Casey M.D.

## 2018-06-12 NOTE — PROGRESS NOTES
2 RN skin check completed. Multiple toyin of bruising noted. Coccyx pink, blanching. No other issues found.

## 2018-06-12 NOTE — ASSESSMENT & PLAN NOTE
- LEFT  - US showed findings consistent with severe arterial insufficiency of the left lower extremity.   - Right MAIN 0.9-1.0 and Left MAIN 0.5-0.89  - Vascular surgery - Dr. Casey. No plan for surgery at this time.   - continue ASA and statin

## 2018-06-12 NOTE — PROGRESS NOTES
Pt repeatedly stating she has to use the bathroom and unable to void, bladder scanned for 732cc. MD aware, order obtained for straight cath. Pt refusing straight cath asking if she can try one more time. Pt able to void on bed pan, scanned post void for 400cc. Education on need for straight cath given again, pt continues addimently to refuse. Pt able to answer orientation questions correctly during both encounters. Will monitor patient frequently for need to void. Dr. Tompkins aware of pt refusal.

## 2018-06-12 NOTE — PROGRESS NOTES
Pt bladder scanned for 736 this morning, education given again in regards to straight cath. Pt agreeable to straight cath. During attempt for straight cath pt was noncompliant with directions, unable to stay still or keep legs open despite staff being at bedside to help. Straight cath attempted but unsuccessful. Oncoming RN aware.

## 2018-06-12 NOTE — PROGRESS NOTES
Pt arrived to neuro unit from ER via gurney at aprox 1515. AAOx4, Salvatore, BLE weak. Nikolai. Pt oriented to unit, room, call light. Bed alarm placed.

## 2018-06-12 NOTE — ASSESSMENT & PLAN NOTE
- chronic  Cholesterol,Tot 267   100 - 199 mg/dL Final   Triglycerides 117  0 - 149 mg/dL Final   HDL 86  >=40 mg/dL Final      <100 mg/dL Final   - high-dose statin

## 2018-06-13 ENCOUNTER — APPOINTMENT (OUTPATIENT)
Dept: RADIOLOGY | Facility: MEDICAL CENTER | Age: 83
DRG: 301 | End: 2018-06-13
Attending: FAMILY MEDICINE
Payer: MEDICARE

## 2018-06-13 ENCOUNTER — APPOINTMENT (OUTPATIENT)
Dept: RADIOLOGY | Facility: MEDICAL CENTER | Age: 83
DRG: 301 | End: 2018-06-13
Attending: NURSE PRACTITIONER
Payer: MEDICARE

## 2018-06-13 PROBLEM — K59.00 CONSTIPATION: Status: ACTIVE | Noted: 2018-06-13

## 2018-06-13 LAB
ANION GAP SERPL CALC-SCNC: 9 MMOL/L (ref 0–11.9)
BUN SERPL-MCNC: 37 MG/DL (ref 8–22)
CALCIUM SERPL-MCNC: 9.3 MG/DL (ref 8.5–10.5)
CHLORIDE SERPL-SCNC: 106 MMOL/L (ref 96–112)
CHOLEST SERPL-MCNC: 267 MG/DL (ref 100–199)
CK SERPL-CCNC: 70 U/L (ref 0–154)
CO2 SERPL-SCNC: 23 MMOL/L (ref 20–33)
CREAT SERPL-MCNC: 1.15 MG/DL (ref 0.5–1.4)
CRP SERPL HS-MCNC: 0.7 MG/DL (ref 0–0.75)
ERYTHROCYTE [SEDIMENTATION RATE] IN BLOOD BY WESTERGREN METHOD: 22 MM/HOUR (ref 0–30)
GLUCOSE SERPL-MCNC: 95 MG/DL (ref 65–99)
HDLC SERPL-MCNC: 86 MG/DL
LDLC SERPL CALC-MCNC: 158 MG/DL
POTASSIUM SERPL-SCNC: 3.6 MMOL/L (ref 3.6–5.5)
SODIUM SERPL-SCNC: 138 MMOL/L (ref 135–145)
TRIGL SERPL-MCNC: 117 MG/DL (ref 0–149)
TSH SERPL DL<=0.005 MIU/L-ACNC: 3.5 UIU/ML (ref 0.38–5.33)

## 2018-06-13 PROCEDURE — 99232 SBSQ HOSP IP/OBS MODERATE 35: CPT | Performed by: FAMILY MEDICINE

## 2018-06-13 PROCEDURE — 74018 RADEX ABDOMEN 1 VIEW: CPT

## 2018-06-13 PROCEDURE — 700102 HCHG RX REV CODE 250 W/ 637 OVERRIDE(OP): Performed by: HOSPITALIST

## 2018-06-13 PROCEDURE — 84443 ASSAY THYROID STIM HORMONE: CPT

## 2018-06-13 PROCEDURE — G8988 SELF CARE GOAL STATUS: HCPCS | Mod: CI

## 2018-06-13 PROCEDURE — 82550 ASSAY OF CK (CPK): CPT

## 2018-06-13 PROCEDURE — 36415 COLL VENOUS BLD VENIPUNCTURE: CPT

## 2018-06-13 PROCEDURE — 76775 US EXAM ABDO BACK WALL LIM: CPT

## 2018-06-13 PROCEDURE — 700111 HCHG RX REV CODE 636 W/ 250 OVERRIDE (IP): Performed by: HOSPITALIST

## 2018-06-13 PROCEDURE — 85652 RBC SED RATE AUTOMATED: CPT

## 2018-06-13 PROCEDURE — 80061 LIPID PANEL: CPT

## 2018-06-13 PROCEDURE — 86140 C-REACTIVE PROTEIN: CPT

## 2018-06-13 PROCEDURE — A9270 NON-COVERED ITEM OR SERVICE: HCPCS | Performed by: NURSE PRACTITIONER

## 2018-06-13 PROCEDURE — 51798 US URINE CAPACITY MEASURE: CPT

## 2018-06-13 PROCEDURE — G8987 SELF CARE CURRENT STATUS: HCPCS | Mod: CL

## 2018-06-13 PROCEDURE — 700102 HCHG RX REV CODE 250 W/ 637 OVERRIDE(OP): Performed by: NURSE PRACTITIONER

## 2018-06-13 PROCEDURE — 80048 BASIC METABOLIC PNL TOTAL CA: CPT

## 2018-06-13 PROCEDURE — A9270 NON-COVERED ITEM OR SERVICE: HCPCS | Performed by: HOSPITALIST

## 2018-06-13 PROCEDURE — 97166 OT EVAL MOD COMPLEX 45 MIN: CPT

## 2018-06-13 PROCEDURE — 770006 HCHG ROOM/CARE - MED/SURG/GYN SEMI*

## 2018-06-13 RX ORDER — OXYBUTYNIN CHLORIDE 5 MG/1
5 TABLET, EXTENDED RELEASE ORAL EVERY EVENING
Status: DISCONTINUED | OUTPATIENT
Start: 2018-06-14 | End: 2018-06-14

## 2018-06-13 RX ORDER — OXYBUTYNIN CHLORIDE 5 MG/1
5 TABLET, EXTENDED RELEASE ORAL ONCE
Status: COMPLETED | OUTPATIENT
Start: 2018-06-13 | End: 2018-06-13

## 2018-06-13 RX ORDER — ATORVASTATIN CALCIUM 80 MG/1
80 TABLET, FILM COATED ORAL
Status: DISCONTINUED | OUTPATIENT
Start: 2018-06-13 | End: 2018-06-14 | Stop reason: HOSPADM

## 2018-06-13 RX ORDER — LOSARTAN POTASSIUM 50 MG/1
100 TABLET ORAL 2 TIMES DAILY
Status: DISCONTINUED | OUTPATIENT
Start: 2018-06-13 | End: 2018-06-14 | Stop reason: HOSPADM

## 2018-06-13 RX ADMIN — LEVOTHYROXINE SODIUM 100 MCG: 100 TABLET ORAL at 05:40

## 2018-06-13 RX ADMIN — ATORVASTATIN CALCIUM 80 MG: 80 TABLET, FILM COATED ORAL at 20:12

## 2018-06-13 RX ADMIN — PREDNISONE 10 MG: 20 TABLET ORAL at 09:10

## 2018-06-13 RX ADMIN — ACETAMINOPHEN 650 MG: 325 TABLET, FILM COATED ORAL at 12:20

## 2018-06-13 RX ADMIN — LOSARTAN POTASSIUM 100 MG: 50 TABLET ORAL at 20:12

## 2018-06-13 RX ADMIN — AMLODIPINE BESYLATE 5 MG: 5 TABLET ORAL at 09:10

## 2018-06-13 RX ADMIN — ACETAMINOPHEN 650 MG: 325 TABLET, FILM COATED ORAL at 03:55

## 2018-06-13 RX ADMIN — OXYBUTYNIN CHLORIDE 5 MG: 5 TABLET, FILM COATED, EXTENDED RELEASE ORAL at 12:22

## 2018-06-13 RX ADMIN — STANDARDIZED SENNA CONCENTRATE AND DOCUSATE SODIUM 2 TABLET: 8.6; 5 TABLET, FILM COATED ORAL at 09:00

## 2018-06-13 RX ADMIN — AMLODIPINE BESYLATE 5 MG: 5 TABLET ORAL at 20:12

## 2018-06-13 RX ADMIN — ENOXAPARIN SODIUM 40 MG: 100 INJECTION SUBCUTANEOUS at 09:09

## 2018-06-13 RX ADMIN — ASPIRIN 81 MG: 81 TABLET, COATED ORAL at 09:10

## 2018-06-13 RX ADMIN — LOSARTAN POTASSIUM 100 MG: 50 TABLET ORAL at 09:10

## 2018-06-13 ASSESSMENT — ENCOUNTER SYMPTOMS
FALLS: 1
INSOMNIA: 0
CONSTIPATION: 1
NAUSEA: 0
FLANK PAIN: 0
SHORTNESS OF BREATH: 0
DOUBLE VISION: 0
SEIZURES: 0
PALPITATIONS: 0
COUGH: 0
ABDOMINAL PAIN: 1
VOMITING: 0
NERVOUS/ANXIOUS: 0
BLURRED VISION: 0
SPEECH CHANGE: 0
MEMORY LOSS: 0
DIAPHORESIS: 0
PHOTOPHOBIA: 0
FOCAL WEAKNESS: 0

## 2018-06-13 ASSESSMENT — COGNITIVE AND FUNCTIONAL STATUS - GENERAL
DRESSING REGULAR UPPER BODY CLOTHING: A LOT
DRESSING REGULAR LOWER BODY CLOTHING: A LOT
HELP NEEDED FOR BATHING: A LOT
DAILY ACTIVITIY SCORE: 13
PERSONAL GROOMING: A LOT
SUGGESTED CMS G CODE MODIFIER DAILY ACTIVITY: CL
EATING MEALS: A LITTLE
TOILETING: A LOT

## 2018-06-13 ASSESSMENT — ACTIVITIES OF DAILY LIVING (ADL): TOILETING: INDEPENDENT

## 2018-06-13 NOTE — DISCHARGE PLANNING
Received Choice form at 1118 from Penny(MIKEY)  Agency/Facility Name: [facility]  Referral sent per Choice form @ 1110.

## 2018-06-13 NOTE — DISCHARGE PLANNING
Transitional Care Navigator:    TCN met with patient and talked to TR Espinoza over the phone per patient's request to discuss transitional care services for discharge planning.  SNF level of care has been recommended.  TCN explained SNF level of care in detail.  Pt/family in agreement.  [facility].  Choice form completed and faxed to [facility].  IMM also completed.  SW aware.  TCN will follow-up as needed.

## 2018-06-13 NOTE — PROGRESS NOTES
[facility] Hospitalist Progress Note    Date of Service: 2018    Chief Complaint  85 y.o. female admitted 2018 with ground level fall.   Was just seen in ED on 6/10 for same.     Interval Problem Update   - more alert today  - moving all extremities - up to chair with assist  - she doesn't recall her last BM - abdomen with generalized tenderness  - continues with urinary retention - Oxybutynin started today  - VSS in no acute distress     - mentation fluctuates. Oriented to self  - CMS fluctuates - at times she is noted to move her right side independently, but when you ask her to move her RUE/RLE, she doesn't.   - LLE cool/pale with no palpable pulse. (+) popliteal occlusion  - urinary retention - bladder scan and I/O cath  - VSS in no acute distress    Consultants/Specialty  - Vascular Surgery - Dr. Casey    Disposition  SNF when medically cleared - planning for Friday.        Review of Systems   Constitutional: Negative for diaphoresis and malaise/fatigue.   HENT: Negative for congestion and ear discharge.    Eyes: Negative for blurred vision, double vision and photophobia.   Respiratory: Negative for cough and shortness of breath.    Cardiovascular: Negative for chest pain and palpitations.   Gastrointestinal: Positive for abdominal pain and constipation. Negative for nausea and vomiting.   Genitourinary: Negative for dysuria and flank pain.   Musculoskeletal: Positive for falls (prior to hospitalization) and joint pain (left hip).   Neurological: Negative for speech change, focal weakness and seizures.   Psychiatric/Behavioral: Negative for memory loss. The patient is not nervous/anxious and does not have insomnia.       Physical Exam  Laboratory/Imaging   Hemodynamics  Temp (24hrs), Av.1 °C (98.7 °F), Min:36.4 °C (97.6 °F), Max:37.3 °C (99.2 °F)   Temperature: 37.1 °C (98.7 °F)  Pulse  Av.6  Min: 61  Max: 84    Blood Pressure : 154/53      Respiratory      Respiration: 18, Pulse  Oximetry: 93 %             Fluids    Intake/Output Summary (Last 24 hours) at 06/13/18 1418  Last data filed at 06/13/18 0600   Gross per 24 hour   Intake                0 ml   Output              800 ml   Net             -800 ml       Nutrition  Orders Placed This Encounter   Procedures   • Diet Order     Standing Status:   Standing     Number of Occurrences:   1     Order Specific Question:   Diet:     Answer:   Regular [1]     Order Specific Question:   Macronutrient modifications:     Answer:   Low Cholesterol [7]     Physical Exam   Constitutional: Vital signs are normal. She appears well-developed. She is cooperative. She has a sickly appearance. No distress.   HENT:   Head: Normocephalic.   Right Ear: Decreased hearing is noted.   Left Ear: Decreased hearing is noted.   Nose: Nose normal.   Mouth/Throat: Oropharynx is clear and moist and mucous membranes are normal.   Eyes: Conjunctivae, EOM and lids are normal. No scleral icterus.   Neck: Phonation normal. No JVD present.   Cardiovascular: Normal rate and intact distal pulses.  Exam reveals distant heart sounds and decreased pulses.    Pulses:       Dorsalis pedis pulses are 1+ on the right side, and 0 on the left side.        Posterior tibial pulses are 1+ on the right side, and 0 on the left side.   LLE cool/pale     Pulmonary/Chest: Breath sounds normal. No respiratory distress. She has no wheezes. She has no rhonchi.   Abdominal: Soft. Normal appearance. She exhibits distension. Bowel sounds are decreased. There is generalized tenderness. There is no rebound.   Generalized tenderness L>R   Musculoskeletal: Normal range of motion.   Neurological: She is alert.   RUE 5/5  RLE 5/5  LUE 4/5  LLE 4/5   Skin: Skin is warm and dry. No rash noted.   Psychiatric: She has a normal mood and affect. Her behavior is normal. She exhibits abnormal recent memory.   Nursing note and vitals reviewed.      Recent Labs      06/11/18   1205  06/12/18   0857   WBC  9.5  7.5      RBC  4.68  4.53   HEMOGLOBIN  14.5  14.2   HEMATOCRIT  44.2  42.1   MCV  94.4  92.9   MCH  31.0  31.3   MCHC  32.8*  33.7   RDW  46.8  46.5   PLATELETCT  181  177   MPV  8.7*  8.8*     Recent Labs      06/11/18   1205  06/12/18   0857  06/13/18   0420   SODIUM  138  137  138   POTASSIUM  4.1  3.8  3.6   CHLORIDE  105  105  106   CO2  22  25  23   GLUCOSE  105*  88  95   BUN  25*  30*  37*   CREATININE  1.01  1.11  1.15   CALCIUM  9.8  9.2  9.3     Recent Labs      06/11/18   1205   APTT  27.9   INR  0.93         Recent Labs      06/13/18   0420   TRIGLYCERIDE  117   HDL  86   LDL  158*          Assessment/Plan     * Arterial insufficiency of lower extremity (HCC)- (present on admission)   Assessment & Plan    - LEFT  - US showed findings consistent with severe arterial insufficiency of the left lower extremity.   - Right MAIN 0.9-1.0 and Left MAIN 0.5-0.89  - Vascular surgery - Dr. Casey - following. No plan for surgery at this time.   - continue ASA and statin          Pain of left hip joint- (present on admission)   Assessment & Plan    - CT (-) fracture - no interventions at this time  - prn analgesics - minimize due to increased confusion/delirium potential. Hold for respiratory depression  - PT/OT  - referral for SNF placed          Essential hypertension- (present on admission)   Assessment & Plan    - chronic  - 154/53  - continue home meds        Dyslipidemia- (present on admission)   Assessment & Plan    - chronic  Cholesterol,Tot 267   100 - 199 mg/dL Final   Triglycerides 117  0 - 149 mg/dL Final   HDL 86  >=40 mg/dL Final      <100 mg/dL Final   - high-dose statin        Stage 3 chronic kidney disease- (present on admission)   Assessment & Plan    - chronic  - labs currently at her baseline  - follow labs  - renal US today        Acquired hypothyroidism- (present on admission)   Assessment & Plan    - chronic  - continue levothyroxine        Constipation   Assessment & Plan    - patient cannot  recall her last BM  - abdomen with generalized tenderness to palpation  - bowel protocol - RN to give Miralax today  - 1-view abdominal x-ray        Urinary retention   Assessment & Plan    - unknown etiology at this time  - required straight cath twice yesterday.   - bladder scan Q6 hours and straight cath for >500 mL  - starting Oxybutynin today          Quality-Core Measures   Reviewed items::  Labs reviewed, Medications reviewed and Radiology images reviewed  Levy catheter::  No Levy  DVT prophylaxis pharmacological::  Enoxaparin (Lovenox)  DVT prophylaxis - mechanical:  SCDs  Ulcer Prophylaxis::  Not indicated  Assessed for rehabilitation services:  Patient was assess for and/or received rehabilitation services during this hospitalization      JUAN CARLOS Medina.

## 2018-06-13 NOTE — THERAPY
"Physical Therapy Evaluation completed.   Bed Mobility:  Supine to Sit: Minimal Assist (raised HOB)  Transfers: Sit to Stand: Moderate Assist (x 4 repetitions throughout session); mod to max stand pivot to her left, discussed with RN staff, pt requesting to continue sitting in chair after evaluation  Gait: Level Of Assist: Unable to Participate   Plan of Care: Will benefit from Physical Therapy 4 times per week  Discharge Recommendations: Equipment: Will Continue to Assess for Equipment Needs.    Pt presents with impaired command following, dynamic balance, left weakness and coordination s/p GLFs. Pt is most limited by poor trunk control and what appears to be left inattention. No neurological facial involvement but trunk balance and left UE/LE weakness and inattention present very stroke like (noted CT negative but for microvascular changes, no brain MRI performed); Exam complicated by poor hearing and inattention to task; discussed with bedside RN; in current condition would be unable to return to her prior living environment (if she is an accurate historian) as she requires physical assist for all mobility. Currently recommend SNF, will follow.     See \"Rehab Therapy-Acute\" Patient Summary Report for complete documentation.     "

## 2018-06-13 NOTE — PROGRESS NOTES
Pt calm and compliant all shift. Denies pain. Pt had not voided and bladder scan performed at 9:20. Straight cath done and UA taken and bladder scan after straight cath was 35. Pt produced good amount of urine the rest of shift. Pt ate well for all meals. Will continue to monitor and intervene as needed

## 2018-06-13 NOTE — ASSESSMENT & PLAN NOTE
- patient cannot recall her last BM  - abdomen less tender and less distended  - abdominal x-ray showed no obstruction  - Miralax yesterday with multiple bowel movements  - bowel protocol

## 2018-06-13 NOTE — THERAPY
"Occupational Therapy Evaluation completed.   Functional Status:  Max A grooming EOB, A primarily for balance - pt brushed hair, Total A to don socks, Max A supine>sit, Max A x2 sit>supine  Plan of Care: Will benefit from Occupational Therapy 3 times per week  Discharge Recommendations:  Equipment: Will Continue to Assess for Equipment Needs. Post-acute therapy: Recommend DC to SNF    See \"Rehab Therapy-Acute\" Patient Summary Report for complete documentation.    Pt is an 86 y/o female who presents to acute 2' R leg weakness and pain. PMH includes Arthritis and HTN. Pt appeared confused during OT eval, frequently off subject and requiring redirection. Pt. Unable to sit unsupported at EOB, had strong lateral lean to the L and posterior. Pt. Would hold L UE in guarded position, w/ cues could demo full AROM w/ it. Coordination is impaired bilaterally. Pt sat at EOB and brushed hair, tied it up while in long sitting. Total A to don socks. Per medical chart pt was independent w/ BADLs for PLOF. Pt presents w/ decreased balance, functional mobility, attention, initiation, follow through, coordination, general strength, and and activity tolerance impacting ability to participate in BADLs independently and safely. Will follow for Acute OT services while in house. Pt. Unsafe to DC home at this time, recommend DC to SNF.     "

## 2018-06-14 VITALS
BODY MASS INDEX: 28.08 KG/M2 | HEART RATE: 81 BPM | DIASTOLIC BLOOD PRESSURE: 65 MMHG | TEMPERATURE: 97.8 F | HEIGHT: 64 IN | RESPIRATION RATE: 16 BRPM | SYSTOLIC BLOOD PRESSURE: 115 MMHG | WEIGHT: 164.46 LBS | OXYGEN SATURATION: 97 %

## 2018-06-14 PROBLEM — R32 URINARY INCONTINENCE: Status: ACTIVE | Noted: 2018-06-14

## 2018-06-14 PROCEDURE — 51798 US URINE CAPACITY MEASURE: CPT

## 2018-06-14 PROCEDURE — 97530 THERAPEUTIC ACTIVITIES: CPT

## 2018-06-14 PROCEDURE — 700102 HCHG RX REV CODE 250 W/ 637 OVERRIDE(OP): Performed by: HOSPITALIST

## 2018-06-14 PROCEDURE — A9270 NON-COVERED ITEM OR SERVICE: HCPCS | Performed by: HOSPITALIST

## 2018-06-14 PROCEDURE — 99239 HOSP IP/OBS DSCHRG MGMT >30: CPT | Performed by: FAMILY MEDICINE

## 2018-06-14 PROCEDURE — A9270 NON-COVERED ITEM OR SERVICE: HCPCS | Performed by: NURSE PRACTITIONER

## 2018-06-14 PROCEDURE — 700111 HCHG RX REV CODE 636 W/ 250 OVERRIDE (IP): Performed by: HOSPITALIST

## 2018-06-14 PROCEDURE — 700102 HCHG RX REV CODE 250 W/ 637 OVERRIDE(OP): Performed by: NURSE PRACTITIONER

## 2018-06-14 RX ORDER — ACETAMINOPHEN 325 MG/1
650 TABLET ORAL EVERY 6 HOURS PRN
Qty: 30 TAB | Refills: 0
Start: 2018-06-14 | End: 2019-08-09

## 2018-06-14 RX ORDER — POLYETHYLENE GLYCOL 3350 17 G/17G
17 POWDER, FOR SOLUTION ORAL
Refills: 3
Start: 2018-06-14 | End: 2019-08-09

## 2018-06-14 RX ORDER — ASPIRIN 81 MG/1
81 TABLET ORAL DAILY
Qty: 30 TAB
Start: 2018-06-15 | End: 2019-08-09

## 2018-06-14 RX ORDER — TAMSULOSIN HYDROCHLORIDE 0.4 MG/1
0.4 CAPSULE ORAL
Status: DISCONTINUED | OUTPATIENT
Start: 2018-06-14 | End: 2018-06-14 | Stop reason: HOSPADM

## 2018-06-14 RX ORDER — ATORVASTATIN CALCIUM 80 MG/1
80 TABLET, FILM COATED ORAL
Qty: 30 TAB
Start: 2018-06-14 | End: 2019-08-09

## 2018-06-14 RX ORDER — TAMSULOSIN HYDROCHLORIDE 0.4 MG/1
0.4 CAPSULE ORAL
Qty: 30 CAP
Start: 2018-06-15 | End: 2019-08-09

## 2018-06-14 RX ADMIN — AMLODIPINE BESYLATE 5 MG: 5 TABLET ORAL at 09:06

## 2018-06-14 RX ADMIN — ACETAMINOPHEN 650 MG: 325 TABLET, FILM COATED ORAL at 09:07

## 2018-06-14 RX ADMIN — LEVOTHYROXINE SODIUM 100 MCG: 100 TABLET ORAL at 05:46

## 2018-06-14 RX ADMIN — ASPIRIN 81 MG: 81 TABLET, COATED ORAL at 09:10

## 2018-06-14 RX ADMIN — LOSARTAN POTASSIUM 100 MG: 50 TABLET ORAL at 09:06

## 2018-06-14 RX ADMIN — ENOXAPARIN SODIUM 40 MG: 100 INJECTION SUBCUTANEOUS at 09:07

## 2018-06-14 RX ADMIN — TAMSULOSIN HYDROCHLORIDE 0.4 MG: 0.4 CAPSULE ORAL at 12:06

## 2018-06-14 RX ADMIN — PREDNISONE 10 MG: 20 TABLET ORAL at 09:06

## 2018-06-14 ASSESSMENT — PAIN SCALES - GENERAL: PAINLEVEL_OUTOF10: 5

## 2018-06-14 ASSESSMENT — ENCOUNTER SYMPTOMS
ABDOMINAL PAIN: 0
NERVOUS/ANXIOUS: 0
VOMITING: 0
CONSTIPATION: 0
FLANK PAIN: 0
NAUSEA: 0
COUGH: 0
PALPITATIONS: 0
MEMORY LOSS: 0
FOCAL WEAKNESS: 0
DIAPHORESIS: 0
SEIZURES: 0
PHOTOPHOBIA: 0
DOUBLE VISION: 0
SPEECH CHANGE: 0
BLURRED VISION: 0
SHORTNESS OF BREATH: 0
INSOMNIA: 0
FALLS: 1

## 2018-06-14 ASSESSMENT — COGNITIVE AND FUNCTIONAL STATUS - GENERAL
TURNING FROM BACK TO SIDE WHILE IN FLAT BAD: UNABLE
CLIMB 3 TO 5 STEPS WITH RAILING: TOTAL
STANDING UP FROM CHAIR USING ARMS: A LOT
MOBILITY SCORE: 8
WALKING IN HOSPITAL ROOM: TOTAL
SUGGESTED CMS G CODE MODIFIER MOBILITY: CM
MOVING TO AND FROM BED TO CHAIR: UNABLE
MOVING FROM LYING ON BACK TO SITTING ON SIDE OF FLAT BED: A LOT

## 2018-06-14 ASSESSMENT — GAIT ASSESSMENTS: GAIT LEVEL OF ASSIST: UNABLE TO PARTICIPATE

## 2018-06-14 NOTE — DISCHARGE INSTRUCTIONS
Discharge Instructions    Discharged to other by medical transportation with escort. Discharged via wheelchair, hospital escort: Yes.  Special equipment needed: Not Applicable    Be sure to schedule a follow-up appointment with your primary care doctor or any specialists as instructed.     Discharge Plan:   Diet Plan: (P) Discussed  Activity Level: (P) Discussed  Confirmed Symptoms Management: (P) Discussed  Influenza Vaccine Indication:  (Not indicated, not flu season)    I understand that a diet low in cholesterol, fat, and sodium is recommended for good health. Unless I have been given specific instructions below for another diet, I accept this instruction as my diet prescription.   Other diet: Low Cholesterol Diet      Special Instructions:     Stroke/CVA/TIA/Hemorrhagic Ischemia Discharge Instructions  You have had a stroke. Your risk factors have been identified as follows:  Age - Over 55  High blood pressure  It is important that you reduce your risk factors to avoid another stroke in the future. Here are some general guidelines to follow:  · Eat healthy - avoid food high in fat.  · Get regular exercise.  · Maintain a healthy weight.  · Avoid smoking.  · Avoid alcohol and illegal drug use.  · Take your medications as directed.  For more information regarding risk factors, refer to pages 17-19 in your Stroke Patient Education Guide. Stroke Education Guide was given to patient.    Warning signs of a stroke include (which can also be found on page 3 of your Stroke Patient Education Guide):  · Sudden numbness of weakness of the face, arm or leg (especially on one side of the body).  · Sudden confusion, trouble speaking or understanding.  · Sudden trouble seeing in one or both eyes.  · Sudden trouble walking, dizziness, loss of balance or coordination.  · Sudden severe headache with no known cause.  It is very important to get treatment quickly when a stroke occurs. If you experience any of the above warning  signs, call [phone] immediately.     Some patients who have had a stroke will be going home on a blood thinner medication called Warfarin (Coumadin).  This medication requires very close monitoring and follow up.  This follow up can be provided by either your Primary Care Physician or by [facility]'s Outpatient Anticoagulation Service.  The Outpatient Anticoagulation Service is located at the [facility] at [facility] ([facility]).  If you do not know when your follow up appointment is scheduled, call [phone] to verify your appointment time.      · Is patient discharged on Warfarin / Coumadin?   No     Depression / Suicide Risk    As you are discharged from this [facility], it is important to learn how to keep safe from harming yourself.    Recognize the warning signs:  · Abrupt changes in personality, positive or negative- including increase in energy   · Giving away possessions  · Change in eating patterns- significant weight changes-  positive or negative  · Change in sleeping patterns- unable to sleep or sleeping all the time   · Unwillingness or inability to communicate  · Depression  · Unusual sadness, discouragement and loneliness  · Talk of wanting to die  · Neglect of personal appearance   · Rebelliousness- reckless behavior  · Withdrawal from people/activities they love  · Confusion- inability to concentrate     If you or a loved one observes any of these behaviors or has concerns about self-harm, here's what you can do:  · Talk about it- your feelings and reasons for harming yourself  · Remove any means that you might use to hurt yourself (examples: pills, rope, extension cords, firearm)  · Get professional help from the community (Mental Health, Substance Abuse, psychological counseling)  · Do not be alone:Call your Safe Contact- someone whom you trust who will be there for you.  · Call your local CRISIS HOTLINE 773-6866 or  358-030-4234  · Call your local Children's Mobile Crisis Response Team [place] (561) 534-9479 or www.[employer].TalkBin  · Call the toll free National Suicide Prevention Hotlines   · National Suicide Prevention Lifeline 074-772-PIZV (5418)  · National netomat Line Network 800-SUICIDE (419-4939)

## 2018-06-14 NOTE — DISCHARGE PLANNING
Agency/Facility Name: [facility]  Spoke To: Jessica  Outcome: Patient accepted pending SCP auth. Notified Jessica to begin auth process per Heather's(LSW) request.

## 2018-06-14 NOTE — DISCHARGE PLANNING
Agency/Facility Name: [facility]  Plan or Request: Received transport form from Heather(GUNNER). Arranged patient's transport to [facility] at 1830 via [facility] transport. Heather(GUNNER) notified of transport time.

## 2018-06-14 NOTE — DISCHARGE PLANNING
Agency/Facility Name: [facility]  Spoke To: Mandie(Admissions)  Outcome: Attempted to follow up, however, no answer. VM left for Mandie.

## 2018-06-14 NOTE — CARE PLAN
Problem: Pain Management  Goal: Pain level will decrease to patient's comfort goal    Intervention: Follow pain managment plan developed in collaboration with patient and Interdisciplinary Team  Complains of left leg pain, medicated per MAR with good results       Problem: Skin Integrity  Goal: Risk for impaired skin integrity will decrease    Intervention: Assess risk factors for impaired skin integrity and/or pressure ulcers  Q2 turns in place

## 2018-06-14 NOTE — DISCHARGE PLANNING
Agency/Facility Name: [facility]  Spoke To: Mandie  Outcome: Accepted patient pending bed. No bed available for patient today. Heather(GUNNER) notified.

## 2018-06-14 NOTE — DISCHARGE PLANNING
Anticipated Discharge Disposition: SNF    Action: Pt is medically clear to transfer to SNF.  [facility] not [facility] have an [facility] bed. [facility] has started ins auth.   LSW faxed transport request form to Maxine AGEEW notified that pt has been scheduled to transfer to [facility] at 1830.      LSW made tc to Ana REHMAN. Left  requesting dc order/summary.    Bedside RN notified    LSW met with pt and pt's son, Urban choudhury.  COBRA authorization obtained and placed with bedside RN      Barriers to Discharge: None    Plan: Pt to dc to [facility]

## 2018-06-14 NOTE — PROGRESS NOTES
Assumed care of pt. A/Ox4. Regular low cholesterol, takes meds whole. PIV in left Ac 20g, saline locked and flushing well. 2 assist with turns. Incontinent. POC SNF pending. Bed alarm on. Call light in reach, bed in low position, will continue hourly rounding.

## 2018-06-14 NOTE — ASSESSMENT & PLAN NOTE
- she was having urinary retention requiring bladder scans and I/O cath. Gave her a dose of Oxybutnin yesterday and now she is completely incontinent of urine  - will place garcia and plan to DC to SNF with garcia in place  - renal US showed no structural abnormalities  - she will need outpatient Urology follow-up

## 2018-06-14 NOTE — PROGRESS NOTES
[facility] Hospitalist Progress Note    Date of Service: 2018    Chief Complaint  85 y.o. female admitted 2018 with ground level fall.   Was just seen in ED on 6/10 for same.     Interval Problem Update   - urinary retention now resolved and having urine incontinence  - DC's Ditropan and started Flomax   - mulitple BM yesterday after Miralax. Abdomen no longer distended and tender.   - alert, appropriate, cooperative, SIMMONS's     - more alert today  - moving all extremities - up to chair with assist  - she doesn't recall her last BM - abdomen with generalized tenderness  - continues with urinary retention - Oxybutynin started today  - VSS in no acute distress      - mentation fluctuates. Oriented to self  - CMS fluctuates - at times she is noted to move her right side independently, but when you ask her to move her RUE/RLE, she doesn't.   - LLE cool/pale with no palpable pulse. (+) popliteal occlusion  - urinary retention - bladder scan and I/O cath  - VSS in no acute distress    Consultants/Specialty  Vascular Surgery - Dr. Casey    Disposition  Plan for SNF tomorrow.        Review of Systems   Constitutional: Negative for diaphoresis and malaise/fatigue.   HENT: Negative for congestion and ear discharge.    Eyes: Negative for blurred vision, double vision and photophobia.   Respiratory: Negative for cough and shortness of breath.    Cardiovascular: Negative for chest pain and palpitations.   Gastrointestinal: Negative for abdominal pain, constipation, nausea and vomiting.   Genitourinary: Negative for dysuria and flank pain.   Musculoskeletal: Positive for falls (prior to hospitalization) and joint pain (left hip).   Neurological: Negative for speech change, focal weakness and seizures.   Psychiatric/Behavioral: Negative for memory loss. The patient is not nervous/anxious and does not have insomnia.       Physical Exam  Laboratory/Imaging   Hemodynamics  Temp (24hrs), Av.8 °C (98.2 °F),  Min:36.4 °C (97.6 °F), Max:36.9 °C (98.5 °F)   Temperature: 36.9 °C (98.5 °F)  Pulse  Av  Min: 61  Max: 84    Blood Pressure : 116/68      Respiratory      Respiration: 17, Pulse Oximetry: 95 %             Fluids    Intake/Output Summary (Last 24 hours) at 18 1414  Last data filed at 18 0900   Gross per 24 hour   Intake              480 ml   Output                0 ml   Net              480 ml       Nutrition  Orders Placed This Encounter   Procedures   • Diet Order     Standing Status:   Standing     Number of Occurrences:   1     Order Specific Question:   Diet:     Answer:   Regular [1]     Order Specific Question:   Macronutrient modifications:     Answer:   Low Cholesterol [7]     Physical Exam   Constitutional: Vital signs are normal. She appears well-developed. She is cooperative.  Non-toxic appearance. She appears ill. No distress.   HENT:   Head: Normocephalic.   Right Ear: Hearing normal.   Left Ear: Hearing normal.   Nose: Nose normal.   Mouth/Throat: Oropharynx is clear and moist and mucous membranes are normal.   Eyes: Conjunctivae, EOM and lids are normal. No scleral icterus.   Neck: Phonation normal. No JVD present.   Cardiovascular: Normal rate, normal heart sounds and intact distal pulses.    Pulses:       Dorsalis pedis pulses are 1+ on the right side, and 0 on the left side.        Posterior tibial pulses are 1+ on the right side, and 0 on the left side.   Pulmonary/Chest: Effort normal and breath sounds normal. No accessory muscle usage. No respiratory distress. She has no rhonchi. She has no rales.   Abdominal: Soft. Normal appearance and bowel sounds are normal. She exhibits no distension. There is no tenderness.   Neurological: She is alert. She is disoriented.   RUE 5/5  LUE 3-4/5  RLE 5/5  LLE 3-4/5   Skin: Skin is warm and dry. No rash noted.   Psychiatric:   Difficult to stay on task.  Oriented to herself.  No behavior outbursts   Nursing note and vitals reviewed.       Recent Labs      06/12/18   0857   WBC  7.5   RBC  4.53   HEMOGLOBIN  14.2   HEMATOCRIT  42.1   MCV  92.9   MCH  31.3   MCHC  33.7   RDW  46.5   PLATELETCT  177   MPV  8.8*     Recent Labs      06/12/18   0857  06/13/18   0420   SODIUM  137  138   POTASSIUM  3.8  3.6   CHLORIDE  105  106   CO2  25  23   GLUCOSE  88  95   BUN  30*  37*   CREATININE  1.11  1.15   CALCIUM  9.2  9.3             Recent Labs      06/13/18   0420   TRIGLYCERIDE  117   HDL  86   LDL  158*          Assessment/Plan     * Arterial insufficiency of lower extremity (HCC)- (present on admission)   Assessment & Plan    - LEFT  - US showed findings consistent with severe arterial insufficiency of the left lower extremity.   - Right MAIN 0.9-1.0 and Left MAIN 0.5-0.89  - Vascular surgery - Dr. Casey. No plan for surgery at this time.   - continue ASA and statin          Pain of left hip joint- (present on admission)   Assessment & Plan    - CT (-) fracture - no interventions at this time  - prn analgesics - minimize due to increased confusion/delirium potential. Hold for respiratory depression  - PT/OT  - plan for SNF today or tomorrow          Essential hypertension- (present on admission)   Assessment & Plan    - chronic  - 116/68 this morning  - continue home meds        Dyslipidemia- (present on admission)   Assessment & Plan    - chronic  Cholesterol,Tot 267   100 - 199 mg/dL Final   Triglycerides 117  0 - 149 mg/dL Final   HDL 86  >=40 mg/dL Final      <100 mg/dL Final   - high-dose statin        Stage 3 chronic kidney disease- (present on admission)   Assessment & Plan    - chronic  - currently at her baseline  - follow labs  - renal US unremarkable        Acquired hypothyroidism- (present on admission)   Assessment & Plan    - chronic  - levothyroxine        Urinary incontinence   Assessment & Plan    - she was having urinary retention requiring bladder scans and I/O cath. Gave her a dose of Oxybutnin yesterday and now she is  completely incontinent of urine  - will place garcia and plan to DC to SNF with garcia in place  - renal US showed no structural abnormalities  - she will need outpatient Urology follow-up         Constipation   Assessment & Plan    - patient cannot recall her last BM  - abdomen less tender and less distended  - abdominal x-ray showed no obstruction  - Miralax yesterday with multiple bowel movements  - bowel protocol        Urinary retention   Assessment & Plan    - resolved  - Flomax          Quality-Core Measures   Reviewed items::  Labs reviewed, Medications reviewed and Radiology images reviewed  Garcia catheter::  No Garcia  DVT prophylaxis pharmacological::  Enoxaparin (Lovenox)  DVT prophylaxis - mechanical:  SCDs  Ulcer Prophylaxis::  Not indicated  Assessed for rehabilitation services:  Patient was assess for and/or received rehabilitation services during this hospitalization      JUAN CARLOS Medina.

## 2018-06-14 NOTE — THERAPY
"Pt w/impaired balance, gait, and activity tolerance. Pt demonstrated heavy L lean and posterior lean, requiring verbal cuing and facilitation. Pt w/poor command following requiring redirection and constant cuing. Pt distracted by L hip pain, requiring redirection. Pt demonstratrting improved intiaition. Pt would benefit from further acute PT txs to progress towards goals and independence. Would reocmmend post acute placement to address deficits.    Physical Therapy Treatment completed.   Bed Mobility:  Supine to Sit: Maximal Assist (x2 people)  Transfers: Sit to Stand: Moderate Assist  Gait: Level Of Assist: Unable to Participate       Plan of Care: Will benefit from Physical Therapy 4 times per week    See \"Rehab Therapy-Acute\" Patient Summary Report for complete documentation.       "

## 2018-06-14 NOTE — DISCHARGE SUMMARY
"Discharge Summary    CHIEF COMPLAINT ON ADMISSION  Chief Complaint   Patient presents with   • GLF       Reason for Admission  ems     CODE STATUS  Full Code    HPI & HOSPITAL COURSE  This is a 85 y.o. Female with PMH HTN, dyslipidemia, and chronic left hip pain here with GLF. She was seen in the ED on 6/10 and was worked up for GLF and was sent home. She returned the next day (this admission) stating she was unable to \"get around\" because her legs felt heavy. In the ED it was noted that she did not have a pulse in her left foot and she had an arterial US which  showed arterial insufficiency in the left leg. LE ART/MAIN showed no evidence of arterial disease on the right with MAIN 0.9-1.0 and mild to moderate arterial disease on the left with MAIN 0.5-0.89. 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    Head CT was unremarkable. She was noted to list to the left side and does have some confusion with difficulty staying on task. Plan was discussed with son and daughter-in-law who did not want MRI at this time as the results would not change the plan of care.   CT hip showed no acute fracture or dislocation with severe degenerative changes of the hip. She was evaluated by PT/OT who both recommended further therapies at a SNF.   She had some urinary retention (>700mL bladder scans) of unknown etiology. She was given one dose of Oxybuytnin and had urinary incontinence. Bladder scans this morning were <200 mL. She was started on Flomax which will continue at OR.   6/13 she was noted to have some abdominal distention and generalized tenderness to palpation. She could not recall her last BM. Abdominal x-ray showed no obstruction. She was given Miralax with multiple BM's following.     Therefore, she is discharged in fair and stable condition to skilled nursing facility.    The patient met 2-midnight criteria for an inpatient stay " at the time of discharge.    DISCHARGE DIAGNOSES  Principal Problem:    Arterial insufficiency of lower extremity (HCC) POA: Yes  Active Problems:    Pain of left hip joint POA: Yes    Acquired hypothyroidism (Chronic) POA: Yes    Stage 3 chronic kidney disease (Chronic) POA: Yes    Dyslipidemia (Chronic) POA: Yes    Essential hypertension (Chronic) POA: Yes    Urinary retention POA: Unknown    Constipation POA: Unknown    Urinary incontinence POA: Unknown  Resolved Problems:    * No resolved hospital problems. *      FOLLOW UP  Future Appointments  Date Time Provider Department Center   6/22/2018 9:20 AM Kirill Friedman M.D. [address]     MEDICATIONS ON DISCHARGE     Medication List      START taking these medications      Instructions   acetaminophen 325 MG Tabs  Commonly known as:  TYLENOL   Take 2 Tabs by mouth every 6 hours as needed (Mild Pain; (Pain scale 1-3); Temp greater than 100.5 F).  Dose:  650 mg     aspirin 81 MG EC tablet  Start taking on:  6/15/2018   Take 1 Tab by mouth every day.  Dose:  81 mg     atorvastatin 80 MG tablet  Commonly known as:  LIPITOR   Take 1 Tab by mouth every bedtime.  Dose:  80 mg     enoxaparin 40 MG/0.4ML Soln inj  Start taking on:  6/15/2018  Commonly known as:  LOVENOX   Inject 40 mg as instructed every day.  Dose:  40 mg     polyethylene glycol/lytes Pack  Commonly known as:  MIRALAX   Take 1 Packet by mouth 1 time daily as needed (if sennosides and docusate ineffective after 24 hours).  Dose:  17 g     tamsulosin 0.4 MG capsule  Start taking on:  6/15/2018  Commonly known as:  FLOMAX   Take 1 Cap by mouth ONE-HALF HOUR AFTER BREAKFAST.  Dose:  0.4 mg        CONTINUE taking these medications      Instructions   amLODIPine 5 MG Tabs  Commonly known as:  NORVASC   TAKE 1 TAB BY MOUTH 2 TIMES A DAY.  Dose:  5 mg     levothyroxine 100 MCG Tabs  Commonly known as:  SYNTHROID   TAKE 1 TAB BY MOUTH EVERY MORNING BEFORE BREAKFAST.  Dose:  100 mcg     losartan 100 MG  Tabs  Commonly known as:  COZAAR   Take 1 Tab by mouth every day.  Dose:  100 mg     predniSONE 10 MG Tabs  Commonly known as:  DELTASONE   Take 10 mg by mouth every day.  Dose:  10 mg     Vitamin D3 2000 UNIT Caps   Take 1 Cap by mouth every evening.  Dose:  1 Cap        STOP taking these medications    cloNIDine 0.1 MG Tabs  Commonly known as:  CATAPRES     hydroCHLOROthiazide 25 MG Tabs  Commonly known as:  HYDRODIURIL            Allergies  Allergies   Allergen Reactions   • Lisinopril Diarrhea, Nausea and Cough     GI problems, lost control   • Pcn [Penicillins] Hives     Reaction in 1967       DIET  Orders Placed This Encounter   Procedures   • Diet Order     Standing Status:   Standing     Number of Occurrences:   1     Order Specific Question:   Diet:     Answer:   Regular [1]     Order Specific Question:   Macronutrient modifications:     Answer:   Low Cholesterol [7]       ACTIVITY  As tolerated and directed by skilled nursing.  Weight bearing as tolerated    LINES, DRAINS, AND WOUNDS  This is an automated list. Peripheral IVs will be removed prior to discharge.  PIV Group Left Antecubital 20g Saline Lock (Active)   Line Secured Taped;Transparent 6/14/2018  8:00 AM   Site Condition / Description Assessed;Patent 6/14/2018  8:00 AM   Dressing Type / Description Clean;Dry;Intact 6/14/2018  8:00 AM   Dressing Status Observed 6/14/2018  8:00 AM   Saline Locked Yes 6/14/2018  8:00 AM   Infiltration Grading Used by [facility] and [facility] 0 6/14/2018  8:00 AM   Phlebitis Scale (Used by [facility]) 0 6/14/2018  8:00 AM                     MENTAL STATUS ON TRANSFER  Level of Consciousness: Alert  Orientation : Oriented x 4  Speech: Speech Clear    CONSULTATIONS  - Dr. Casey - General Surgery    PROCEDURES  NA    LABORATORY  Lab Results   Component Value Date    SODIUM 138 06/13/2018    POTASSIUM 3.6 06/13/2018    CHLORIDE 106 06/13/2018    CO2 23 06/13/2018    GLUCOSE 95 06/13/2018    BUN 37 (H) 06/13/2018    CREATININE 1.15  06/13/2018    CREATININE 0.9 09/01/2005        Lab Results   Component Value Date    WBC 7.5 06/12/2018    HEMOGLOBIN 14.2 06/12/2018    HEMATOCRIT 42.1 06/12/2018    PLATELETCT 177 06/12/2018        Total time of the discharge process exceeds 40 minutes.  JUAN CARLOS Medina.

## 2018-09-27 ENCOUNTER — HOSPITAL ENCOUNTER (OUTPATIENT)
Dept: LAB | Facility: MEDICAL CENTER | Age: 83
End: 2018-09-27
Attending: PHYSICIAN ASSISTANT
Payer: MEDICARE

## 2018-09-27 LAB
BASOPHILS # BLD AUTO: 0.7 % (ref 0–1.8)
BASOPHILS # BLD: 0.06 K/UL (ref 0–0.12)
EOSINOPHIL # BLD AUTO: 0.12 K/UL (ref 0–0.51)
EOSINOPHIL NFR BLD: 1.3 % (ref 0–6.9)
ERYTHROCYTE [DISTWIDTH] IN BLOOD BY AUTOMATED COUNT: 47.2 FL (ref 35.9–50)
HCT VFR BLD AUTO: 43.4 % (ref 37–47)
HGB BLD-MCNC: 13.3 G/DL (ref 12–16)
IMM GRANULOCYTES # BLD AUTO: 0.05 K/UL (ref 0–0.11)
IMM GRANULOCYTES NFR BLD AUTO: 0.5 % (ref 0–0.9)
LYMPHOCYTES # BLD AUTO: 1.57 K/UL (ref 1–4.8)
LYMPHOCYTES NFR BLD: 17 % (ref 22–41)
MCH RBC QN AUTO: 31.1 PG (ref 27–33)
MCHC RBC AUTO-ENTMCNC: 30.6 G/DL (ref 33.6–35)
MCV RBC AUTO: 101.6 FL (ref 81.4–97.8)
MONOCYTES # BLD AUTO: 0.75 K/UL (ref 0–0.85)
MONOCYTES NFR BLD AUTO: 8.1 % (ref 0–13.4)
NEUTROPHILS # BLD AUTO: 6.66 K/UL (ref 2–7.15)
NEUTROPHILS NFR BLD: 72.4 % (ref 44–72)
NRBC # BLD AUTO: 0 K/UL
NRBC BLD-RTO: 0 /100 WBC
PLATELET # BLD AUTO: 277 K/UL (ref 164–446)
PMV BLD AUTO: 9.8 FL (ref 9–12.9)
RBC # BLD AUTO: 4.27 M/UL (ref 4.2–5.4)
T4 FREE SERPL-MCNC: 1.08 NG/DL (ref 0.53–1.43)
TSH SERPL DL<=0.005 MIU/L-ACNC: 3.02 UIU/ML (ref 0.38–5.33)
WBC # BLD AUTO: 9.2 K/UL (ref 4.8–10.8)

## 2018-09-27 PROCEDURE — 84439 ASSAY OF FREE THYROXINE: CPT

## 2018-09-27 PROCEDURE — 80061 LIPID PANEL: CPT

## 2018-09-27 PROCEDURE — 80053 COMPREHEN METABOLIC PANEL: CPT

## 2018-09-27 PROCEDURE — 85025 COMPLETE CBC W/AUTO DIFF WBC: CPT

## 2018-09-27 PROCEDURE — 84443 ASSAY THYROID STIM HORMONE: CPT

## 2018-09-27 PROCEDURE — 83036 HEMOGLOBIN GLYCOSYLATED A1C: CPT

## 2018-09-27 PROCEDURE — 36415 COLL VENOUS BLD VENIPUNCTURE: CPT

## 2018-09-28 LAB
ALBUMIN SERPL BCP-MCNC: 3.8 G/DL (ref 3.2–4.9)
ALBUMIN/GLOB SERPL: 1 G/DL
ALP SERPL-CCNC: 159 U/L (ref 30–99)
ALT SERPL-CCNC: 52 U/L (ref 2–50)
ANION GAP SERPL CALC-SCNC: 12 MMOL/L (ref 0–11.9)
AST SERPL-CCNC: 39 U/L (ref 12–45)
BILIRUB SERPL-MCNC: 0.5 MG/DL (ref 0.1–1.5)
BUN SERPL-MCNC: 25 MG/DL (ref 8–22)
CALCIUM SERPL-MCNC: 9.1 MG/DL (ref 8.5–10.5)
CHLORIDE SERPL-SCNC: 106 MMOL/L (ref 96–112)
CHOLEST SERPL-MCNC: 138 MG/DL (ref 100–199)
CO2 SERPL-SCNC: 22 MMOL/L (ref 20–33)
CREAT SERPL-MCNC: 1.07 MG/DL (ref 0.5–1.4)
EST. AVERAGE GLUCOSE BLD GHB EST-MCNC: 108 MG/DL
FASTING STATUS PATIENT QL REPORTED: NORMAL
GLOBULIN SER CALC-MCNC: 3.7 G/DL (ref 1.9–3.5)
GLUCOSE SERPL-MCNC: 84 MG/DL (ref 65–99)
HBA1C MFR BLD: 5.4 % (ref 0–5.6)
HDLC SERPL-MCNC: 61 MG/DL
LDLC SERPL CALC-MCNC: 52 MG/DL
POTASSIUM SERPL-SCNC: 4 MMOL/L (ref 3.6–5.5)
PROT SERPL-MCNC: 7.5 G/DL (ref 6–8.2)
SODIUM SERPL-SCNC: 140 MMOL/L (ref 135–145)
TRIGL SERPL-MCNC: 126 MG/DL (ref 0–149)

## 2019-02-23 ENCOUNTER — HOSPITAL ENCOUNTER (OUTPATIENT)
Dept: LAB | Facility: MEDICAL CENTER | Age: 84
End: 2019-02-23
Attending: PHYSICIAN ASSISTANT
Payer: MEDICARE

## 2019-02-23 LAB
ALBUMIN SERPL BCP-MCNC: 4.4 G/DL (ref 3.2–4.9)
ALBUMIN/GLOB SERPL: 1.4 G/DL
ALP SERPL-CCNC: 62 U/L (ref 30–99)
ALT SERPL-CCNC: 68 U/L (ref 2–50)
ANION GAP SERPL CALC-SCNC: 6 MMOL/L (ref 0–11.9)
AST SERPL-CCNC: 60 U/L (ref 12–45)
BASOPHILS # BLD AUTO: 0.6 % (ref 0–1.8)
BASOPHILS # BLD: 0.04 K/UL (ref 0–0.12)
BILIRUB SERPL-MCNC: 0.8 MG/DL (ref 0.1–1.5)
BUN SERPL-MCNC: 29 MG/DL (ref 8–22)
CALCIUM SERPL-MCNC: 10.2 MG/DL (ref 8.5–10.5)
CHLORIDE SERPL-SCNC: 108 MMOL/L (ref 96–112)
CO2 SERPL-SCNC: 24 MMOL/L (ref 20–33)
CREAT SERPL-MCNC: 1.05 MG/DL (ref 0.5–1.4)
CRP SERPL HS-MCNC: 0.04 MG/DL (ref 0–0.75)
EOSINOPHIL # BLD AUTO: 0.02 K/UL (ref 0–0.51)
EOSINOPHIL NFR BLD: 0.3 % (ref 0–6.9)
ERYTHROCYTE [DISTWIDTH] IN BLOOD BY AUTOMATED COUNT: 47.6 FL (ref 35.9–50)
ERYTHROCYTE [SEDIMENTATION RATE] IN BLOOD BY WESTERGREN METHOD: 25 MM/HOUR (ref 0–30)
GLOBULIN SER CALC-MCNC: 3.2 G/DL (ref 1.9–3.5)
GLUCOSE SERPL-MCNC: 94 MG/DL (ref 65–99)
HCT VFR BLD AUTO: 44.7 % (ref 37–47)
HGB BLD-MCNC: 14.7 G/DL (ref 12–16)
IMM GRANULOCYTES # BLD AUTO: 0.03 K/UL (ref 0–0.11)
IMM GRANULOCYTES NFR BLD AUTO: 0.4 % (ref 0–0.9)
LYMPHOCYTES # BLD AUTO: 1.1 K/UL (ref 1–4.8)
LYMPHOCYTES NFR BLD: 15.6 % (ref 22–41)
MCH RBC QN AUTO: 32.3 PG (ref 27–33)
MCHC RBC AUTO-ENTMCNC: 32.9 G/DL (ref 33.6–35)
MCV RBC AUTO: 98.2 FL (ref 81.4–97.8)
MONOCYTES # BLD AUTO: 0.48 K/UL (ref 0–0.85)
MONOCYTES NFR BLD AUTO: 6.8 % (ref 0–13.4)
NEUTROPHILS # BLD AUTO: 5.4 K/UL (ref 2–7.15)
NEUTROPHILS NFR BLD: 76.3 % (ref 44–72)
NRBC # BLD AUTO: 0 K/UL
NRBC BLD-RTO: 0 /100 WBC
PLATELET # BLD AUTO: 232 K/UL (ref 164–446)
PMV BLD AUTO: 9.9 FL (ref 9–12.9)
POTASSIUM SERPL-SCNC: 4.2 MMOL/L (ref 3.6–5.5)
PROT SERPL-MCNC: 7.6 G/DL (ref 6–8.2)
RBC # BLD AUTO: 4.55 M/UL (ref 4.2–5.4)
SODIUM SERPL-SCNC: 138 MMOL/L (ref 135–145)
T4 FREE SERPL-MCNC: 1.14 NG/DL (ref 0.53–1.43)
TSH SERPL DL<=0.005 MIU/L-ACNC: 1.78 UIU/ML (ref 0.38–5.33)
WBC # BLD AUTO: 7.1 K/UL (ref 4.8–10.8)

## 2019-02-23 PROCEDURE — 86140 C-REACTIVE PROTEIN: CPT

## 2019-02-23 PROCEDURE — 85652 RBC SED RATE AUTOMATED: CPT

## 2019-02-23 PROCEDURE — 86038 ANTINUCLEAR ANTIBODIES: CPT

## 2019-02-23 PROCEDURE — 36415 COLL VENOUS BLD VENIPUNCTURE: CPT

## 2019-02-23 PROCEDURE — 84439 ASSAY OF FREE THYROXINE: CPT

## 2019-02-23 PROCEDURE — 86039 ANTINUCLEAR ANTIBODIES (ANA): CPT

## 2019-02-23 PROCEDURE — 85025 COMPLETE CBC W/AUTO DIFF WBC: CPT

## 2019-02-23 PROCEDURE — 80053 COMPREHEN METABOLIC PANEL: CPT

## 2019-02-23 PROCEDURE — 84443 ASSAY THYROID STIM HORMONE: CPT

## 2019-02-25 LAB — NUCLEAR IGG SER QL IA: DETECTED

## 2019-02-27 LAB
ANA INTERPRETIVE COMMENT Q5143: ABNORMAL
ANA PATTERN Q5144: ABNORMAL
ANA TITER Q5145: ABNORMAL
ANTINUCLEAR ANTIBODY (ANA), HEP-2, IGG Q5142: DETECTED
CYTOPLASMIC PATTERN TITER Q5148: ABNORMAL

## 2019-04-27 ENCOUNTER — HOSPITAL ENCOUNTER (OUTPATIENT)
Dept: LAB | Facility: MEDICAL CENTER | Age: 84
End: 2019-04-27
Attending: PHYSICIAN ASSISTANT
Payer: MEDICARE

## 2019-04-27 LAB
ALBUMIN SERPL BCP-MCNC: 4 G/DL (ref 3.2–4.9)
ALP SERPL-CCNC: 75 U/L (ref 30–99)
ALT SERPL-CCNC: 78 U/L (ref 2–50)
AST SERPL-CCNC: 67 U/L (ref 12–45)
BILIRUB CONJ SERPL-MCNC: 0.2 MG/DL (ref 0.1–0.5)
BILIRUB INDIRECT SERPL-MCNC: 0.6 MG/DL (ref 0–1)
BILIRUB SERPL-MCNC: 0.8 MG/DL (ref 0.1–1.5)
GGT SERPL-CCNC: 68 U/L (ref 7–34)
PROT SERPL-MCNC: 7.4 G/DL (ref 6–8.2)

## 2019-04-27 PROCEDURE — 82977 ASSAY OF GGT: CPT

## 2019-04-27 PROCEDURE — 82105 ALPHA-FETOPROTEIN SERUM: CPT

## 2019-04-27 PROCEDURE — 80076 HEPATIC FUNCTION PANEL: CPT

## 2019-04-27 PROCEDURE — 36415 COLL VENOUS BLD VENIPUNCTURE: CPT

## 2019-04-29 LAB — AFP-TM SERPL-MCNC: 3 NG/ML (ref 0–9)

## 2019-08-09 ENCOUNTER — APPOINTMENT (OUTPATIENT)
Dept: RADIOLOGY | Facility: MEDICAL CENTER | Age: 84
End: 2019-08-09
Attending: EMERGENCY MEDICINE
Payer: MEDICARE

## 2019-08-09 ENCOUNTER — HOSPITAL ENCOUNTER (OUTPATIENT)
Facility: MEDICAL CENTER | Age: 84
End: 2019-08-12
Attending: EMERGENCY MEDICINE | Admitting: INTERNAL MEDICINE
Payer: MEDICARE

## 2019-08-09 DIAGNOSIS — I21.4 NON-STEMI (NON-ST ELEVATED MYOCARDIAL INFARCTION) (HCC): ICD-10-CM

## 2019-08-09 DIAGNOSIS — R53.1 WEAKNESS: ICD-10-CM

## 2019-08-09 DIAGNOSIS — I16.1 HYPERTENSIVE EMERGENCY: ICD-10-CM

## 2019-08-09 DIAGNOSIS — I10 ESSENTIAL HYPERTENSION: Chronic | ICD-10-CM

## 2019-08-09 LAB
ANION GAP SERPL CALC-SCNC: 9 MMOL/L (ref 0–11.9)
APPEARANCE UR: CLEAR
BASOPHILS # BLD AUTO: 0.3 % (ref 0–1.8)
BASOPHILS # BLD: 0.02 K/UL (ref 0–0.12)
BILIRUB UR QL STRIP.AUTO: NEGATIVE
BUN SERPL-MCNC: 29 MG/DL (ref 8–22)
CALCIUM SERPL-MCNC: 9.4 MG/DL (ref 8.5–10.5)
CHLORIDE SERPL-SCNC: 108 MMOL/L (ref 96–112)
CO2 SERPL-SCNC: 22 MMOL/L (ref 20–33)
COLOR UR: YELLOW
CREAT SERPL-MCNC: 1.18 MG/DL (ref 0.5–1.4)
EKG IMPRESSION: NORMAL
EOSINOPHIL # BLD AUTO: 0.04 K/UL (ref 0–0.51)
EOSINOPHIL NFR BLD: 0.6 % (ref 0–6.9)
ERYTHROCYTE [DISTWIDTH] IN BLOOD BY AUTOMATED COUNT: 45.2 FL (ref 35.9–50)
GLUCOSE SERPL-MCNC: 111 MG/DL (ref 65–99)
GLUCOSE UR STRIP.AUTO-MCNC: NEGATIVE MG/DL
HCT VFR BLD AUTO: 41.9 % (ref 37–47)
HGB BLD-MCNC: 14.1 G/DL (ref 12–16)
IMM GRANULOCYTES # BLD AUTO: 0.02 K/UL (ref 0–0.11)
IMM GRANULOCYTES NFR BLD AUTO: 0.3 % (ref 0–0.9)
KETONES UR STRIP.AUTO-MCNC: NEGATIVE MG/DL
LEUKOCYTE ESTERASE UR QL STRIP.AUTO: NEGATIVE
LYMPHOCYTES # BLD AUTO: 1.73 K/UL (ref 1–4.8)
LYMPHOCYTES NFR BLD: 26.9 % (ref 22–41)
MCH RBC QN AUTO: 32.3 PG (ref 27–33)
MCHC RBC AUTO-ENTMCNC: 33.7 G/DL (ref 33.6–35)
MCV RBC AUTO: 96.1 FL (ref 81.4–97.8)
MICRO URNS: NORMAL
MONOCYTES # BLD AUTO: 0.62 K/UL (ref 0–0.85)
MONOCYTES NFR BLD AUTO: 9.6 % (ref 0–13.4)
NEUTROPHILS # BLD AUTO: 4.01 K/UL (ref 2–7.15)
NEUTROPHILS NFR BLD: 62.3 % (ref 44–72)
NITRITE UR QL STRIP.AUTO: NEGATIVE
NRBC # BLD AUTO: 0 K/UL
NRBC BLD-RTO: 0 /100 WBC
NT-PROBNP SERPL IA-MCNC: 561 PG/ML (ref 0–125)
PH UR STRIP.AUTO: 7 [PH] (ref 5–8)
PLATELET # BLD AUTO: 181 K/UL (ref 164–446)
PMV BLD AUTO: 9.3 FL (ref 9–12.9)
POTASSIUM SERPL-SCNC: 4.6 MMOL/L (ref 3.6–5.5)
PROT UR QL STRIP: NEGATIVE MG/DL
RBC # BLD AUTO: 4.36 M/UL (ref 4.2–5.4)
RBC UR QL AUTO: NEGATIVE
SODIUM SERPL-SCNC: 139 MMOL/L (ref 135–145)
SP GR UR STRIP.AUTO: <=1.005
TROPONIN T SERPL-MCNC: 29 NG/L (ref 6–19)
TROPONIN T SERPL-MCNC: 34 NG/L (ref 6–19)
UROBILINOGEN UR STRIP.AUTO-MCNC: 0.2 MG/DL
WBC # BLD AUTO: 6.4 K/UL (ref 4.8–10.8)

## 2019-08-09 PROCEDURE — 93005 ELECTROCARDIOGRAM TRACING: CPT | Performed by: EMERGENCY MEDICINE

## 2019-08-09 PROCEDURE — 96374 THER/PROPH/DIAG INJ IV PUSH: CPT

## 2019-08-09 PROCEDURE — A9270 NON-COVERED ITEM OR SERVICE: HCPCS | Performed by: EMERGENCY MEDICINE

## 2019-08-09 PROCEDURE — 99285 EMERGENCY DEPT VISIT HI MDM: CPT

## 2019-08-09 PROCEDURE — 99220 PR INITIAL OBSERVATION CARE,LEVL III: CPT | Performed by: INTERNAL MEDICINE

## 2019-08-09 PROCEDURE — 700111 HCHG RX REV CODE 636 W/ 250 OVERRIDE (IP): Performed by: EMERGENCY MEDICINE

## 2019-08-09 PROCEDURE — 700102 HCHG RX REV CODE 250 W/ 637 OVERRIDE(OP): Performed by: EMERGENCY MEDICINE

## 2019-08-09 PROCEDURE — 36415 COLL VENOUS BLD VENIPUNCTURE: CPT

## 2019-08-09 PROCEDURE — 83880 ASSAY OF NATRIURETIC PEPTIDE: CPT

## 2019-08-09 PROCEDURE — G0378 HOSPITAL OBSERVATION PER HR: HCPCS

## 2019-08-09 PROCEDURE — 96375 TX/PRO/DX INJ NEW DRUG ADDON: CPT

## 2019-08-09 PROCEDURE — 81003 URINALYSIS AUTO W/O SCOPE: CPT

## 2019-08-09 PROCEDURE — 84484 ASSAY OF TROPONIN QUANT: CPT

## 2019-08-09 PROCEDURE — 85025 COMPLETE CBC W/AUTO DIFF WBC: CPT

## 2019-08-09 PROCEDURE — 80048 BASIC METABOLIC PNL TOTAL CA: CPT

## 2019-08-09 RX ORDER — TAMSULOSIN HYDROCHLORIDE 0.4 MG/1
0.4 CAPSULE ORAL
Status: DISCONTINUED | OUTPATIENT
Start: 2019-08-10 | End: 2019-08-09

## 2019-08-09 RX ORDER — HYDRALAZINE HYDROCHLORIDE 20 MG/ML
10 INJECTION INTRAMUSCULAR; INTRAVENOUS EVERY 6 HOURS PRN
Status: DISCONTINUED | OUTPATIENT
Start: 2019-08-09 | End: 2019-08-12 | Stop reason: HOSPADM

## 2019-08-09 RX ORDER — SPIRONOLACTONE 25 MG/1
25 TABLET ORAL DAILY
Status: DISCONTINUED | OUTPATIENT
Start: 2019-08-10 | End: 2019-08-12 | Stop reason: HOSPADM

## 2019-08-09 RX ORDER — SPIRONOLACTONE 25 MG/1
25 TABLET ORAL DAILY
COMMUNITY
End: 2019-08-09

## 2019-08-09 RX ORDER — HYDRALAZINE HYDROCHLORIDE 25 MG/1
25 TABLET, FILM COATED ORAL 4 TIMES DAILY
Status: DISCONTINUED | OUTPATIENT
Start: 2019-08-10 | End: 2019-08-10

## 2019-08-09 RX ORDER — LOSARTAN POTASSIUM 50 MG/1
100 TABLET ORAL
Status: DISCONTINUED | OUTPATIENT
Start: 2019-08-10 | End: 2019-08-12 | Stop reason: HOSPADM

## 2019-08-09 RX ORDER — PREDNISONE 2.5 MG/1
2.5 TABLET ORAL DAILY
Status: DISCONTINUED | OUTPATIENT
Start: 2019-08-10 | End: 2019-08-12 | Stop reason: HOSPADM

## 2019-08-09 RX ORDER — LOSARTAN POTASSIUM 100 MG/1
100 TABLET ORAL
COMMUNITY

## 2019-08-09 RX ORDER — BISACODYL 10 MG
10 SUPPOSITORY, RECTAL RECTAL
Status: DISCONTINUED | OUTPATIENT
Start: 2019-08-09 | End: 2019-08-12 | Stop reason: HOSPADM

## 2019-08-09 RX ORDER — MELOXICAM 15 MG/1
15 TABLET ORAL DAILY
COMMUNITY
End: 2019-08-09

## 2019-08-09 RX ORDER — HYDRALAZINE HYDROCHLORIDE 25 MG/1
25 TABLET, FILM COATED ORAL 3 TIMES DAILY
COMMUNITY
End: 2019-08-09

## 2019-08-09 RX ORDER — AMLODIPINE BESYLATE 5 MG/1
5 TABLET ORAL 2 TIMES DAILY
Status: DISCONTINUED | OUTPATIENT
Start: 2019-08-09 | End: 2019-08-09

## 2019-08-09 RX ORDER — FUROSEMIDE 10 MG/ML
20 INJECTION INTRAMUSCULAR; INTRAVENOUS ONCE
Status: COMPLETED | OUTPATIENT
Start: 2019-08-09 | End: 2019-08-09

## 2019-08-09 RX ORDER — HYDRALAZINE HYDROCHLORIDE 25 MG/1
25 TABLET, FILM COATED ORAL 4 TIMES DAILY
Status: ON HOLD | COMMUNITY
End: 2019-08-11 | Stop reason: SINTOL

## 2019-08-09 RX ORDER — ATORVASTATIN CALCIUM 40 MG/1
40 TABLET, FILM COATED ORAL NIGHTLY
COMMUNITY

## 2019-08-09 RX ORDER — ONDANSETRON 4 MG/1
4 TABLET, ORALLY DISINTEGRATING ORAL EVERY 4 HOURS PRN
Status: DISCONTINUED | OUTPATIENT
Start: 2019-08-09 | End: 2019-08-12 | Stop reason: HOSPADM

## 2019-08-09 RX ORDER — ENALAPRILAT 1.25 MG/ML
1.25 INJECTION INTRAVENOUS EVERY 6 HOURS PRN
Status: DISCONTINUED | OUTPATIENT
Start: 2019-08-09 | End: 2019-08-12 | Stop reason: HOSPADM

## 2019-08-09 RX ORDER — LEVOTHYROXINE SODIUM 0.1 MG/1
100 TABLET ORAL
COMMUNITY

## 2019-08-09 RX ORDER — PREDNISONE 5 MG/1
2.5 TABLET ORAL DAILY
COMMUNITY

## 2019-08-09 RX ORDER — POLYETHYLENE GLYCOL 3350 17 G/17G
1 POWDER, FOR SOLUTION ORAL
Status: DISCONTINUED | OUTPATIENT
Start: 2019-08-09 | End: 2019-08-12 | Stop reason: HOSPADM

## 2019-08-09 RX ORDER — AMOXICILLIN 250 MG
2 CAPSULE ORAL 2 TIMES DAILY
Status: DISCONTINUED | OUTPATIENT
Start: 2019-08-09 | End: 2019-08-12 | Stop reason: HOSPADM

## 2019-08-09 RX ORDER — MELOXICAM 15 MG/1
15 TABLET ORAL DAILY
Status: ON HOLD | COMMUNITY
End: 2019-08-12

## 2019-08-09 RX ORDER — SPIRONOLACTONE 25 MG/1
25 TABLET ORAL DAILY
COMMUNITY

## 2019-08-09 RX ORDER — ATORVASTATIN CALCIUM 40 MG/1
40 TABLET, FILM COATED ORAL
Status: DISCONTINUED | OUTPATIENT
Start: 2019-08-09 | End: 2019-08-12 | Stop reason: HOSPADM

## 2019-08-09 RX ORDER — LEVOTHYROXINE SODIUM 0.1 MG/1
100 TABLET ORAL
Status: DISCONTINUED | OUTPATIENT
Start: 2019-08-10 | End: 2019-08-12 | Stop reason: HOSPADM

## 2019-08-09 RX ORDER — ONDANSETRON 2 MG/ML
4 INJECTION INTRAMUSCULAR; INTRAVENOUS EVERY 4 HOURS PRN
Status: DISCONTINUED | OUTPATIENT
Start: 2019-08-09 | End: 2019-08-12 | Stop reason: HOSPADM

## 2019-08-09 RX ORDER — ACETAMINOPHEN 325 MG/1
650 TABLET ORAL EVERY 6 HOURS PRN
Status: DISCONTINUED | OUTPATIENT
Start: 2019-08-09 | End: 2019-08-12 | Stop reason: HOSPADM

## 2019-08-09 RX ORDER — LABETALOL HYDROCHLORIDE 5 MG/ML
10 INJECTION, SOLUTION INTRAVENOUS EVERY 4 HOURS PRN
Status: DISCONTINUED | OUTPATIENT
Start: 2019-08-09 | End: 2019-08-12 | Stop reason: HOSPADM

## 2019-08-09 RX ORDER — ASPIRIN 325 MG
325 TABLET ORAL ONCE
Status: COMPLETED | OUTPATIENT
Start: 2019-08-09 | End: 2019-08-09

## 2019-08-09 RX ADMIN — ASPIRIN 325 MG: 325 TABLET, FILM COATED ORAL at 21:45

## 2019-08-09 RX ADMIN — FUROSEMIDE 20 MG: 10 INJECTION, SOLUTION INTRAMUSCULAR; INTRAVENOUS at 21:46

## 2019-08-09 RX ADMIN — HYDRALAZINE HYDROCHLORIDE 10 MG: 20 INJECTION INTRAMUSCULAR; INTRAVENOUS at 19:23

## 2019-08-09 ASSESSMENT — ENCOUNTER SYMPTOMS
VOMITING: 0
STRIDOR: 0
DIARRHEA: 0
DIZZINESS: 0
NAUSEA: 0
COUGH: 0
MYALGIAS: 0
ABDOMINAL PAIN: 0
FALLS: 1
SPUTUM PRODUCTION: 0
FEVER: 0
WEAKNESS: 1
TINGLING: 0
CONSTIPATION: 0
PALPITATIONS: 0
LOSS OF CONSCIOUSNESS: 0
SHORTNESS OF BREATH: 0
HEADACHES: 0
DEPRESSION: 0
CHILLS: 0

## 2019-08-10 PROBLEM — R73.9 HYPERGLYCEMIA: Status: ACTIVE | Noted: 2019-08-10

## 2019-08-10 PROBLEM — R79.89 ELEVATED TROPONIN: Status: ACTIVE | Noted: 2019-08-10

## 2019-08-10 PROBLEM — R53.1 WEAKNESS: Status: ACTIVE | Noted: 2019-08-10

## 2019-08-10 LAB — TROPONIN T SERPL-MCNC: 42 NG/L (ref 6–19)

## 2019-08-10 PROCEDURE — G0378 HOSPITAL OBSERVATION PER HR: HCPCS

## 2019-08-10 PROCEDURE — A9270 NON-COVERED ITEM OR SERVICE: HCPCS | Performed by: INTERNAL MEDICINE

## 2019-08-10 PROCEDURE — 700102 HCHG RX REV CODE 250 W/ 637 OVERRIDE(OP): Performed by: INTERNAL MEDICINE

## 2019-08-10 PROCEDURE — 97162 PT EVAL MOD COMPLEX 30 MIN: CPT

## 2019-08-10 PROCEDURE — 97165 OT EVAL LOW COMPLEX 30 MIN: CPT

## 2019-08-10 PROCEDURE — 700111 HCHG RX REV CODE 636 W/ 250 OVERRIDE (IP): Performed by: INTERNAL MEDICINE

## 2019-08-10 PROCEDURE — 99226 PR SUBSEQUENT OBSERVATION CARE,LEVEL III: CPT | Performed by: INTERNAL MEDICINE

## 2019-08-10 PROCEDURE — 36415 COLL VENOUS BLD VENIPUNCTURE: CPT

## 2019-08-10 PROCEDURE — 96372 THER/PROPH/DIAG INJ SC/IM: CPT

## 2019-08-10 PROCEDURE — 84484 ASSAY OF TROPONIN QUANT: CPT

## 2019-08-10 RX ORDER — AMLODIPINE BESYLATE 5 MG/1
5 TABLET ORAL
Status: DISCONTINUED | OUTPATIENT
Start: 2019-08-10 | End: 2019-08-11

## 2019-08-10 RX ADMIN — ATORVASTATIN CALCIUM 40 MG: 40 TABLET, FILM COATED ORAL at 21:23

## 2019-08-10 RX ADMIN — ASPIRIN 81 MG: 81 TABLET, COATED ORAL at 05:11

## 2019-08-10 RX ADMIN — LEVOTHYROXINE SODIUM 100 MCG: 100 TABLET ORAL at 08:23

## 2019-08-10 RX ADMIN — SENNOSIDES, DOCUSATE SODIUM 2 TABLET: 50; 8.6 TABLET, FILM COATED ORAL at 17:20

## 2019-08-10 RX ADMIN — LOSARTAN POTASSIUM 100 MG: 50 TABLET ORAL at 21:23

## 2019-08-10 RX ADMIN — HYDRALAZINE HYDROCHLORIDE 25 MG: 25 TABLET, FILM COATED ORAL at 13:17

## 2019-08-10 RX ADMIN — HYDRALAZINE HYDROCHLORIDE 25 MG: 25 TABLET, FILM COATED ORAL at 08:23

## 2019-08-10 RX ADMIN — LOSARTAN POTASSIUM 100 MG: 50 TABLET ORAL at 00:21

## 2019-08-10 RX ADMIN — ATORVASTATIN CALCIUM 40 MG: 40 TABLET, FILM COATED ORAL at 00:19

## 2019-08-10 RX ADMIN — AMLODIPINE BESYLATE 5 MG: 5 TABLET ORAL at 17:18

## 2019-08-10 RX ADMIN — ENOXAPARIN SODIUM 40 MG: 100 INJECTION SUBCUTANEOUS at 05:12

## 2019-08-10 RX ADMIN — SPIRONOLACTONE 25 MG: 25 TABLET ORAL at 05:11

## 2019-08-10 RX ADMIN — PREDNISONE 2.5 MG: 2.5 TABLET ORAL at 05:12

## 2019-08-10 ASSESSMENT — LIFESTYLE VARIABLES
ON A TYPICAL DAY WHEN YOU DRINK ALCOHOL HOW MANY DRINKS DO YOU HAVE: 0
HAVE PEOPLE ANNOYED YOU BY CRITICIZING YOUR DRINKING: NO
TOTAL SCORE: 0
EVER HAD A DRINK FIRST THING IN THE MORNING TO STEADY YOUR NERVES TO GET RID OF A HANGOVER: NO
TOTAL SCORE: 0
AVERAGE NUMBER OF DAYS PER WEEK YOU HAVE A DRINK CONTAINING ALCOHOL: 0
EVER_SMOKED: YES
SUBSTANCE_ABUSE: 0
TOTAL SCORE: 0
HOW MANY TIMES IN THE PAST YEAR HAVE YOU HAD 5 OR MORE DRINKS IN A DAY: 0
DOES PATIENT WANT TO STOP DRINKING: NO
ALCOHOL_USE: NO
CONSUMPTION TOTAL: NEGATIVE
EVER FELT BAD OR GUILTY ABOUT YOUR DRINKING: NO
HAVE YOU EVER FELT YOU SHOULD CUT DOWN ON YOUR DRINKING: NO

## 2019-08-10 ASSESSMENT — ENCOUNTER SYMPTOMS
SPEECH CHANGE: 0
PALPITATIONS: 0
ORTHOPNEA: 0
DIZZINESS: 0
DIARRHEA: 0
NAUSEA: 0
HALLUCINATIONS: 0
BLURRED VISION: 0
SENSORY CHANGE: 0
TINGLING: 0
NECK PAIN: 0
FEVER: 0
DOUBLE VISION: 0
HEADACHES: 0
SHORTNESS OF BREATH: 0
CHILLS: 0
SPUTUM PRODUCTION: 0
MYALGIAS: 0
TREMORS: 0
PHOTOPHOBIA: 0
CONSTIPATION: 0
WEIGHT LOSS: 0
EYE PAIN: 0
BACK PAIN: 0
ABDOMINAL PAIN: 0
FOCAL WEAKNESS: 0
COUGH: 0
VOMITING: 0

## 2019-08-10 ASSESSMENT — COGNITIVE AND FUNCTIONAL STATUS - GENERAL
MOBILITY SCORE: 20
DAILY ACTIVITIY SCORE: 21
DRESSING REGULAR LOWER BODY CLOTHING: A LITTLE
CLIMB 3 TO 5 STEPS WITH RAILING: A LITTLE
WALKING IN HOSPITAL ROOM: A LITTLE
SUGGESTED CMS G CODE MODIFIER DAILY ACTIVITY: CJ
STANDING UP FROM CHAIR USING ARMS: A LITTLE
MOVING FROM LYING ON BACK TO SITTING ON SIDE OF FLAT BED: A LITTLE
HELP NEEDED FOR BATHING: A LITTLE
SUGGESTED CMS G CODE MODIFIER MOBILITY: CJ
TOILETING: A LITTLE

## 2019-08-10 ASSESSMENT — GAIT ASSESSMENTS
ASSISTIVE DEVICE: FRONT WHEEL WALKER
DISTANCE (FEET): 150
GAIT LEVEL OF ASSIST: STAND BY ASSIST

## 2019-08-10 ASSESSMENT — PATIENT HEALTH QUESTIONNAIRE - PHQ9
SUM OF ALL RESPONSES TO PHQ9 QUESTIONS 1 AND 2: 0
2. FEELING DOWN, DEPRESSED, IRRITABLE, OR HOPELESS: NOT AT ALL
1. LITTLE INTEREST OR PLEASURE IN DOING THINGS: NOT AT ALL

## 2019-08-10 ASSESSMENT — ACTIVITIES OF DAILY LIVING (ADL): TOILETING: REQUIRES ASSIST

## 2019-08-10 NOTE — ASSESSMENT & PLAN NOTE
Mild elevation, associated with a slightly larger elevation in BNP  Echocardiogram was ordered and currently its pending.  Continue to monitor.  Mildly elevated troponin most likely secondary to hypertensive urgency.  He does not have any acute chest pain.  Continue to monitor on telemetry.  She denies any chest pain and had echocardiogram showed Left ventricular ejection fraction is visually estimated to be 65%.

## 2019-08-10 NOTE — ASSESSMENT & PLAN NOTE
Continue outpatient Synthroid 100 MCG daily.  I reviewed his recent TSH is within normal limits  I ordered TSH.

## 2019-08-10 NOTE — ASSESSMENT & PLAN NOTE
Presentation she found to have significantly elevated systolic blood pressure which was more than 200 mmHg.  Continue outpatient blood pressure medications including losartan.  PRN enalapril, hydralazine and labetalol as needed with parameters.  Echocardiogram ordered and currently its pending.  I increase the dose of amlodipine from 5 mg to 10 mg.  Her echocardiogram showed Left ventricular ejection fraction is visually estimated to be 65%.

## 2019-08-10 NOTE — ED NOTES
Med Rec Updated and Complete per Pt's MAR from [facility]  Allergies Reviewed per MAR  No PO ABX listed on Pt's MAR.    Pt is on a maintenance dose of Prednisone.

## 2019-08-10 NOTE — PROGRESS NOTES
From: Patel Palacios  To: Francisco Bran MD  Sent: 7/6/2019 10:07 AM CDT  Subject: Test Results Question    looking for a Rx for urinary track infection at [facility].   [facility] Daily Progress Note    Date of Service  8/10/2019    Chief Complaint  86 y.o. female admitted 8/9/2019 with fatigue    Hospital Course    *86-year-old female with past medical history of chronic kidney disease, hypertension, dyslipidemia and hypothyroidism presented to the hospital with complaint of fatigue.  On presentation she found to have significantly elevated blood pressure systolic blood pressure was 200s on presentation.  She also found to have mildly elevated troponin.*      Interval Problem Update  I evaluated and examined her at the bedside.  She reported that she is feeling significantly better.  Her troponin is slightly elevated with current troponin of 42.  Her troponin could be related to hypertensive urgency.  She denies any symptoms of chest pain.  Her hemoglobin is stable.  I discussed plan of care with her.  PT/OT evaluation and recommendations.    Consultants/Specialty  None    Code Status  Full code    Disposition  To be decided    Review of Systems  Review of Systems   Constitutional: Positive for malaise/fatigue. Negative for chills, fever and weight loss.   HENT: Negative for hearing loss and tinnitus.    Eyes: Negative for blurred vision, double vision, photophobia and pain.   Respiratory: Negative for cough, sputum production and shortness of breath.    Cardiovascular: Negative for chest pain, palpitations, orthopnea and leg swelling.   Gastrointestinal: Negative for abdominal pain, constipation, diarrhea, nausea and vomiting.   Genitourinary: Negative for dysuria, frequency and urgency.   Musculoskeletal: Negative for back pain, joint pain, myalgias and neck pain.   Skin: Negative for rash.   Neurological: Negative for dizziness, tingling, tremors, sensory change, speech change, focal weakness and headaches.   Psychiatric/Behavioral: Negative for hallucinations and substance abuse.   All other systems reviewed and are negative.       Physical Exam  Temp:  [36.1 °C (97  °F)-36.9 °C (98.4 °F)] 36.7 °C (98.1 °F)  Pulse:  [51-75] 62  Resp:  [15-18] 15  BP: (122-187)/(56-73) 165/66  SpO2:  [93 %-98 %] 95 %    Physical Exam   Constitutional: She is oriented to person, place, and time. No distress.   She is laying in bed without any acute distress.   HENT:   Head: Normocephalic and atraumatic.   Eyes: Pupils are equal, round, and reactive to light. Right eye exhibits no discharge. Left eye exhibits no discharge.   Neck: Normal range of motion. Neck supple.   Cardiovascular: Normal rate, regular rhythm and normal heart sounds. Exam reveals no friction rub.   No murmur heard.  Pulmonary/Chest: Effort normal and breath sounds normal. No respiratory distress. She has no wheezes. She has no rales.   Abdominal: Soft. Bowel sounds are normal. She exhibits no distension. There is no tenderness. There is no rebound.   Musculoskeletal: Normal range of motion. She exhibits no edema or tenderness.   Neurological: She is alert and oriented to person, place, and time. No cranial nerve deficit.   No focal neurological deficit.   Skin: Skin is warm and dry. No rash noted. She is not diaphoretic. No erythema.   Psychiatric: She has a normal mood and affect. Her behavior is normal.       Fluids    Intake/Output Summary (Last 24 hours) at 8/10/2019 0909  Last data filed at 8/10/2019 0100  Gross per 24 hour   Intake 250 ml   Output --   Net 250 ml       Laboratory  Recent Labs     08/09/19 1925   WBC 6.4   RBC 4.36   HEMOGLOBIN 14.1   HEMATOCRIT 41.9   MCV 96.1   MCH 32.3   MCHC 33.7   RDW 45.2   PLATELETCT 181   MPV 9.3     Recent Labs     08/09/19 1925   SODIUM 139   POTASSIUM 4.6   CHLORIDE 108   CO2 22   GLUCOSE 111*   BUN 29*   CREATININE 1.18   CALCIUM 9.4                   Imaging  EC-ECHOCARDIOGRAM COMPLETE W/O CONT    (Results Pending)        Assessment/Plan  Hypertensive urgency- (present on admission)  Assessment & Plan  Presentation she found to have significantly elevated systolic blood  pressure which was more than 200 mmHg.  Continue outpatient blood pressure medications including losartan hydralazine.  PRN enalapril, hydralazine and labetalol as needed with parameters.  Echocardiogram ordered and currently its pending.  Avoid overcorrection of blood pressure.      Dyslipidemia- (present on admission)  Assessment & Plan  Continue outpatient statin.    Stage 3 chronic kidney disease (HCC)- (present on admission)  Assessment & Plan  She is currently at her baseline.  Avoid nephrotoxins  Medication dose adjustment as per renal function.      Acquired hypothyroidism- (present on admission)  Assessment & Plan  Continue outpatient Synthroid 100 MCG daily.  I reviewed his recent TSH is within normal limits  I ordered TSH.      Hyperglycemia  Assessment & Plan  Mild  Continue monitor    Elevated troponin  Assessment & Plan  Mild elevation, associated with a slightly larger elevation in BNP  Echocardiogram was ordered and currently its pending.  Continue to monitor.  Mildly elevated troponin most likely secondary to hypertensive urgency.  He does not have any acute chest pain.  Continue to monitor on telemetry.      Weakness  Assessment & Plan  Acute on chronic  PT/OT evaluation and recommendations per  Patient came from group home.      I discussed plan of care during multidisciplinary rounds.    VTE prophylaxis: Lovenox

## 2019-08-10 NOTE — ASSESSMENT & PLAN NOTE
She is currently at her baseline.  Avoid nephrotoxins  Medication dose adjustment as per renal function.

## 2019-08-10 NOTE — ED PROVIDER NOTES
ED Provider Note    Scribed for Kirill Lopez M.D. by Leif Shipman. 2019  6:52 PM    Primary care provider: SHALINI Perez  Means of arrival: Ambulance  History obtained from: Patient  History limited by: None    CHIEF COMPLAINT  Chief Complaint   Patient presents with   • Hypertension     x2d       HPI  Felipa Loyd is a 86 y.o. female who presents to the Emergency Department for evaluation of hypertension onset 2 days ago. The patient reports that she lives in a group home and was determined to have a high blood pressure which prompted her visit to the ED. She reports constant general fatigue onset 2 days ago, but denies any associated headaches, chest pain, nausea, emesis, or diarrhea. Additionally, no alleviating or exacerbating factors were identified for the patient's general fatigue. The patient reports that she takes prednisone and is concerned for her kidney function. She has a history of hyperlipidemia and cholelithiasis, but no other long-term medical diseases were identified.    REVIEW OF SYSTEMS  Pertinent positives include general fatigue, hypertension. Pertinent negatives include no headaches, chest pain, nausea, emesis, diarrhea.  All other systems reviewed and negative.    PAST MEDICAL HISTORY   has a past medical history of Arthritis, Cholelithiasis (2015), Hyperlipidemia (2015), and Hypertension.    SURGICAL HISTORY   has a past surgical history that includes abdominal hysterectomy total.    SOCIAL HISTORY  Social History     Tobacco Use   • Smoking status: Former Smoker     Packs/day: 2.50     Years: 15.00     Pack years: 37.50     Last attempt to quit: 1962     Years since quittin.6   • Smokeless tobacco: Never Used   • Tobacco comment: age 30   Substance Use Topics   • Alcohol use: Yes     Alcohol/week: 0.0 oz   • Drug use: No      Social History     Substance and Sexual Activity   Drug Use No       FAMILY HISTORY  Family History   Problem Relation  "Age of Onset   • Thyroid Mother    • Heart Attack Father         MI at age 65   • Hypertension Sister    • Heart Disease Sister         Stent   • Alzheimer's Disease Brother    • Diabetes Sister    • Heart Disease Sister         rheumatic heart disease   • Arthritis Sister    • Alzheimer's Disease Brother        CURRENT MEDICATIONS  Home Medications     Reviewed by Sean Oliveira R.N. (Registered Nurse) on 08/09/19 at 1831  Med List Status: Partial   Medication Last Dose Status   acetaminophen (TYLENOL) 325 MG Tab  Active   amlodipine (NORVASC) 5 MG Tab  Active   aspirin EC 81 MG EC tablet 8/9/2019 Active   atorvastatin (LIPITOR) 80 MG tablet 8/9/2019 Active   Cholecalciferol (VITAMIN D3) 2000 UNIT Cap 8/9/2019 Active   enoxaparin (LOVENOX) 40 MG/0.4ML Solution inj  Active   hydrALAZINE (APRESOLINE) 25 MG Tab 8/9/2019 Active   Lactobacillus Rhamnosus, GG, (CULTURELLE PO) 8/9/2019 Active   levothyroxine (SYNTHROID) 100 MCG Tab 8/9/2019 Active   losartan (COZAAR) 100 MG Tab 8/9/2019 Active   meloxicam (MOBIC) 15 MG tablet 8/9/2019 Active   polyethylene glycol/lytes (MIRALAX) Pack  Active   predniSONE (DELTASONE) 10 MG Tab 8/9/2019 Active   spironolactone (ALDACTONE) 25 MG Tab 8/9/2019 Active   tamsulosin (FLOMAX) 0.4 MG capsule  Active                ALLERGIES  Allergies   Allergen Reactions   • Lisinopril Diarrhea, Nausea and Cough     GI problems, lost control   • Pcn [Penicillins] Hives     Reaction in 1967       PHYSICAL EXAM  VITAL SIGNS: BP (!) 186/68   Pulse (!) 57   Temp 36.9 °C (98.4 °F) (Temporal)   Resp 16   Ht 1.651 m (5' 5\")   Wt 83 kg (183 lb)   BMI 30.45 kg/m²      Vital signs reviewed.  Constitutional:  Appears well-developed and well-nourished. No distress.   Head: Normocephalic.   Mouth/Throat: Oropharynx is clear and moist.   Eyes: EOM are normal. Pupils are equal, round, and reactive to light.   Neck: Normal range of motion. Neck supple.   Cardiovascular: Regular rate and rhythm.  Without " murmur rub or gallop  Pulmonary/Chest: Lungs clear to auscultation.  No rales  Abdominal: Soft and non-tender. There is no rebound and no guarding.   Musculoskeletal: Exhibits no edema.   Lymphadenopathy: No cervical adenopathy.   Neurological: Patient is alert and oriented to person, place, and time. CNs II - XII intact. DTRs intact. Normal sensation and strength.  Skin: Skin is warm and dry.   Psychiatric: Patient has a normal mood and affect. Behavior is normal.       EKG  12 Lead EKG interpreted by me to show sinus rhythm at 60. Normal P waves. Normal QRS. Normal ST segments. T-wave inversions in AVL. Abnormal EKG. No change from previous EKG.    LABS  Results for orders placed or performed during the hospital encounter of 08/09/19   CBC WITH DIFFERENTIAL   Result Value Ref Range    WBC 6.4 4.8 - 10.8 K/uL    RBC 4.36 4.20 - 5.40 M/uL    Hemoglobin 14.1 12.0 - 16.0 g/dL    Hematocrit 41.9 37.0 - 47.0 %    MCV 96.1 81.4 - 97.8 fL    MCH 32.3 27.0 - 33.0 pg    MCHC 33.7 33.6 - 35.0 g/dL    RDW 45.2 35.9 - 50.0 fL    Platelet Count 181 164 - 446 K/uL    MPV 9.3 9.0 - 12.9 fL    Neutrophils-Polys 62.30 44.00 - 72.00 %    Lymphocytes 26.90 22.00 - 41.00 %    Monocytes 9.60 0.00 - 13.40 %    Eosinophils 0.60 0.00 - 6.90 %    Basophils 0.30 0.00 - 1.80 %    Immature Granulocytes 0.30 0.00 - 0.90 %    Nucleated RBC 0.00 /100 WBC    Neutrophils (Absolute) 4.01 2.00 - 7.15 K/uL    Lymphs (Absolute) 1.73 1.00 - 4.80 K/uL    Monos (Absolute) 0.62 0.00 - 0.85 K/uL    Eos (Absolute) 0.04 0.00 - 0.51 K/uL    Baso (Absolute) 0.02 0.00 - 0.12 K/uL    Immature Granulocytes (abs) 0.02 0.00 - 0.11 K/uL    NRBC (Absolute) 0.00 K/uL   BASIC METABOLIC PANEL   Result Value Ref Range    Sodium 139 135 - 145 mmol/L    Potassium 4.6 3.6 - 5.5 mmol/L    Chloride 108 96 - 112 mmol/L    Co2 22 20 - 33 mmol/L    Glucose 111 (H) 65 - 99 mg/dL    Bun 29 (H) 8 - 22 mg/dL    Creatinine 1.18 0.50 - 1.40 mg/dL    Calcium 9.4 8.5 - 10.5 mg/dL     Anion Gap 9.0 0.0 - 11.9   TROPONIN   Result Value Ref Range    Troponin T 29 (H) 6 - 19 ng/L   proBrain Natriuretic Peptide, NT   Result Value Ref Range    NT-proBNP 561 (H) 0 - 125 pg/mL   URINALYSIS,CULTURE IF INDICATED   Result Value Ref Range    Color Yellow     Character Clear     Specific Gravity <=1.005 <1.035    Ph 7.0 5.0 - 8.0    Glucose Negative Negative mg/dL    Ketones Negative Negative mg/dL    Protein Negative Negative mg/dL    Bilirubin Negative Negative    Urobilinogen, Urine 0.2 Negative    Nitrite Negative Negative    Leukocyte Esterase Negative Negative    Occult Blood Negative Negative    Micro Urine Req see below    ESTIMATED GFR   Result Value Ref Range    GFR If  52 (A) >60 mL/min/1.73 m 2    GFR If Non  43 (A) >60 mL/min/1.73 m 2   EKG (NOW)   Result Value Ref Range    Report       [facility] Emergency Dept.    Test Date:  2019  Pt Name:    DAWOOD THORPE            Department: ER  MRN:        [id number]                      Room:       [facility]  Gender:     Female                       Technician: [id number]  :        [id number]                   Requested By:GAEL HINOJOSA  Order #:    [id number]                    Reading MD: GAEL HINOJOSA MD    Measurements  Intervals                                Axis  Rate:       65                           P:          58  NH:         176                          QRS:        -15  QRSD:       88                           T:          102  QT:         468  QTc:        487    Interpretive Statements  SINUS RHYTHM  INFERIOR INFARCT, OLD  LATERAL LEADS ARE ALSO INVOLVED  BORDERLINE PROLONGED QT INTERVAL  TWI aVL  Compared to ECG 2017 09:49:13  No significant changes    Electronically Signed On 2019 21:03:26 PDT by GAEL HINOJOSA MD       All labs reviewed by me.    COURSE & MEDICAL DECISION MAKING  Pertinent Labs & Imaging studies reviewed. (See chart for details) The patient's [facility]  Nursing and past medical records were reviewed    6:52 PM - Patient seen and examined at bedside. Patient will be treated with Apresoline 10 mg. Ordered UA culture, Estimated GFR, CBC Diff, BMP, Troponin, BNP, and POC UA to evaluate her symptoms. The differential diagnoses include but are not limited to: Hypertensive emergency, STEMI, heart failure    7:00 PM - Ordered Apresoline 10 mg.    7:03 PM - I discussed the patient's condition plan of care with the patient's son and his wife.  Blood pressure is quite a ligated and now she has signs of heart failure and non-STEMI.  Patient will be admitted by the [facility] hospitalist    8:35 PM I discussed the patient's case and the above findings with Dr. Benitez (Hospitalist) who agrees to admit the patient.    DISPOSITION:  Patient will be admitted to Dr. Benitez (Hospitalist) in guarded condition.    FINAL IMPRESSION  1. Hypertensive emergency    2. Non-STEMI (non-ST elevated myocardial infarction) (HCC)          ILeif (Scribe), am scribing for, and in the presence of, Kirill Lopez M.D..    Electronically signed by: Leif Shipman (Scribe), 8/9/2019    IKirill M.D. personally performed the services described in this documentation, as scribed by Leif Shipman in my presence, and it is both accurate and complete.    C    The note accurately reflects work and decisions made by me.  Kirill Lopez  8/9/2019  9:25 PM

## 2019-08-10 NOTE — H&P
Hospital Medicine History & Physical Note    Date of Service  8/9/2019    Primary Care Physician  SHALINI Perez    Consultants  None    Code Status  Full    Chief Complaint  Fatigue    History of Presenting Illness  86 y.o. female who presented 8/9/2019 with fatigue.  Patient lives at a group home, apparently has been feeling ill the last couple of days.  When asked to further describe this she just says fatigue and lethargy.  She does complain of chronic weakness but denies any falls.  She also denies any syncopal episodes.  Upon arrival, she was noted to have a mild troponin elevation and a mild BNP elevation.  She was also noted to have a significantly elevated blood pressure.  I did asked the family how often blood pressures checked at her group home and they stated not very often.  Whenever the family was told about her complaints they did ask for the blood pressure to be checked and it was systolic greater than 200 they are as well as in the emergency department.  She denies any fever, chills, nausea, vomiting, chest pain or shortness of breath.  I did discuss the case including labs with the ER physician.    Review of Systems  Review of Systems   Constitutional: Positive for malaise/fatigue. Negative for chills and fever.   HENT: Negative for congestion.    Respiratory: Negative for cough, sputum production, shortness of breath and stridor.    Cardiovascular: Negative for chest pain, palpitations and leg swelling.   Gastrointestinal: Negative for abdominal pain, constipation, diarrhea, nausea and vomiting.   Genitourinary: Negative for dysuria and urgency.   Musculoskeletal: Positive for falls. Negative for myalgias.   Neurological: Positive for weakness. Negative for dizziness, tingling, loss of consciousness and headaches.   Psychiatric/Behavioral: Negative for depression and suicidal ideas.   All other systems reviewed and are negative.      Past Medical History   has a past medical history of  Arthritis, Cholelithiasis (1/6/2015), Hyperlipidemia (1/6/2015), and Hypertension.    Surgical History   has a past surgical history that includes abdominal hysterectomy total.     Family History  family history includes Alzheimer's Disease in her brother and brother; Arthritis in her sister; Diabetes in her sister; Heart Attack in her father; Heart Disease in her sister and sister; Hypertension in her sister; Thyroid in her mother.     Social History   reports that she quit smoking about 57 years ago. She has a 37.50 pack-year smoking history. She has never used smokeless tobacco. She reports that she drinks alcohol. She reports that she does not use drugs.    Allergies  Allergies   Allergen Reactions   • Lisinopril Diarrhea, Nausea and Cough     GI problems, lost control   • Pcn [Penicillins] Hives     Reaction in 1967       Medications  Prior to Admission Medications   Prescriptions Last Dose Informant Patient Reported? Taking?   Cholecalciferol (VITAMIN D3) 2000 UNIT Cap 8/9/2019 at Unknown time Patient Yes No   Sig: Take 1 Cap by mouth every evening.   Lactobacillus Rhamnosus, GG, (CULTURELLE PO) 8/9/2019 at Unknown time  Yes Yes   Sig: Take  by mouth.   acetaminophen (TYLENOL) 325 MG Tab   No No   Sig: Take 2 Tabs by mouth every 6 hours as needed (Mild Pain; (Pain scale 1-3); Temp greater than 100.5 F).   amlodipine (NORVASC) 5 MG Tab  Patient No No   Sig: TAKE 1 TAB BY MOUTH 2 TIMES A DAY.   aspirin EC 81 MG EC tablet 8/9/2019 at Unknown time  No No   Sig: Take 1 Tab by mouth every day.   atorvastatin (LIPITOR) 80 MG tablet 8/9/2019 at Unknown time  No No   Sig: Take 1 Tab by mouth every bedtime.   enoxaparin (LOVENOX) 40 MG/0.4ML Solution inj   No No   Sig: Inject 40 mg as instructed every day.   hydrALAZINE (APRESOLINE) 25 MG Tab 8/9/2019 at Unknown time  Yes Yes   Sig: Take 25 mg by mouth 3 times a day.   levothyroxine (SYNTHROID) 100 MCG Tab 8/9/2019 at Unknown time Patient No No   Sig: TAKE 1 TAB BY  MOUTH EVERY MORNING BEFORE BREAKFAST.   losartan (COZAAR) 100 MG Tab 8/9/2019 at Unknown time Patient No No   Sig: Take 1 Tab by mouth every day.   meloxicam (MOBIC) 15 MG tablet 8/9/2019 at Unknown time  Yes Yes   Sig: Take 15 mg by mouth every day.   polyethylene glycol/lytes (MIRALAX) Pack   No No   Sig: Take 1 Packet by mouth 1 time daily as needed (if sennosides and docusate ineffective after 24 hours).   predniSONE (DELTASONE) 10 MG Tab 8/9/2019 at Unknown time Patient Yes No   Sig: Take 10 mg by mouth every day.   spironolactone (ALDACTONE) 25 MG Tab 8/9/2019 at Unknown time  Yes Yes   Sig: Take 25 mg by mouth every day.   tamsulosin (FLOMAX) 0.4 MG capsule   No No   Sig: Take 1 Cap by mouth ONE-HALF HOUR AFTER BREAKFAST.      Facility-Administered Medications: None       Physical Exam  Temp:  [36.9 °C (98.4 °F)] 36.9 °C (98.4 °F)  Pulse:  [57-66] 66  Resp:  [16] 16  BP: (157-186)/(56-68) 157/60  SpO2:  [93 %-98 %] 96 %    Physical Exam   Constitutional: She is oriented to person, place, and time. She appears well-developed. She is cooperative. No distress.   HENT:   Head: Normocephalic and atraumatic. Not macrocephalic and not microcephalic. Head is without raccoon's eyes and without Elkins's sign.   Right Ear: External ear normal.   Left Ear: External ear normal.   Mouth/Throat: Oropharynx is clear and moist. No oropharyngeal exudate.   Eyes: Conjunctivae are normal. Right eye exhibits no discharge. Left eye exhibits no discharge. No scleral icterus.   Neck: Neck supple. No tracheal deviation present.   Cardiovascular: Normal rate, regular rhythm and intact distal pulses. Exam reveals no gallop, no distant heart sounds and no friction rub.   Murmur heard.  Pulmonary/Chest: Effort normal and breath sounds normal. No accessory muscle usage or stridor. No tachypnea and no bradypnea. No respiratory distress. She has no decreased breath sounds. She has no wheezes. She has no rhonchi. She has no rales. She  exhibits no tenderness.   Abdominal: Soft. Bowel sounds are normal. She exhibits no distension. There is no hepatosplenomegaly, splenomegaly or hepatomegaly. There is no tenderness. There is no rebound and no guarding.   Musculoskeletal: Normal range of motion. She exhibits no edema or tenderness.   Bilateral LE weakness   Lymphadenopathy:     She has no cervical adenopathy.   Neurological: She is alert and oriented to person, place, and time. No cranial nerve deficit. She displays no seizure activity.   Skin: Skin is warm, dry and intact. No rash noted. She is not diaphoretic. No erythema. No pallor.   Psychiatric: She has a normal mood and affect. Her speech is normal and behavior is normal. Judgment and thought content normal. Cognition and memory are normal.   Nursing note and vitals reviewed.      Laboratory:  Recent Labs     08/09/19 1925   WBC 6.4   RBC 4.36   HEMOGLOBIN 14.1   HEMATOCRIT 41.9   MCV 96.1   MCH 32.3   MCHC 33.7   RDW 45.2   PLATELETCT 181   MPV 9.3     Recent Labs     08/09/19 1925   SODIUM 139   POTASSIUM 4.6   CHLORIDE 108   CO2 22   GLUCOSE 111*   BUN 29*   CREATININE 1.18   CALCIUM 9.4     Recent Labs     08/09/19 1925   GLUCOSE 111*         Recent Labs     08/09/19 1925   NTPROBNP 561*         Recent Labs     08/09/19 1925   TROPONINT 29*       Urinalysis:    Recent Labs     08/09/19 1925   SPECGRAVITY <=1.005   GLUCOSEUR Negative   KETONES Negative   NITRITE Negative   LEUKESTERAS Negative        Imaging:  EC-ECHOCARDIOGRAM COMPLETE W/O CONT    (Results Pending)         Assessment/Plan:  I anticipate this patient is appropriate for observation status at this time.    Hypertensive urgency- (present on admission)  Assessment & Plan  -Significantly elevated with a systolic blood pressure greater than 200 on multiple reads  -Continue home meds, losartan and hydralazine  -Place PRN enalapril, labetalol, hydralazine  -Adjust as needed  -Obtain echocardiogram    Dyslipidemia- (present on  admission)  Assessment & Plan  -Continue home statin    Stage 3 chronic kidney disease (HCC)- (present on admission)  Assessment & Plan  -Near her baseline  -I do not feel she is dehydrated and does not need IV fluids  -Repeat BMP in the morning    Acquired hypothyroidism- (present on admission)  Assessment & Plan  -Continue home Synthroid at 100 mcg daily  -Most recent TSH was approximately 6 months ago was normal at 1.78    Hyperglycemia  Assessment & Plan  -Mild elevation, no need for coverage    Elevated troponin  Assessment & Plan  -Mild elevation, associated with a slightly larger elevation in BNP  -I think this is likely due to the profound elevation in the blood pressure  -Continue to trend troponin  -Obtain echocardiogram    Weakness  Assessment & Plan  -Acute on chronic  -Obtain PT/OT and await recommendations  -There is assistance at the group home but apparently not physical therapy      VTE prophylaxis: Lovenox

## 2019-08-10 NOTE — PROGRESS NOTES
Pt transported up to unit on a [employer] monitor with this RN and OTIS Vogel. Once on unit, pt hooked up to tele monitor, monitor room notified. Pt SR 69.

## 2019-08-10 NOTE — ED TRIAGE NOTES
"Pt bib ems from Care Home.  Chief Complaint   Patient presents with   • Hypertension     x2d     Pt states she has not felt well x2d. \"I just don't feel like myself.\"  Pt AOx4. IRIS. Reports she has been taking her BP meds as directed.  Pt in a gown. Connected to monitor. Chart up for ERP.  "

## 2019-08-10 NOTE — PROGRESS NOTES
Assumed care at 0700. Bedside report received from Kelsie. Patient's chart and MAR reviewed. Pt denies pain at this time. Pt is A & O 4, with hearing loss (hearing aids at home). Patient was updated on plan of care for the day. Questions answered and concerns addressed.  Pt denies any additional needs at this time. White board updated. Call light, phone and personal belongings within reach.

## 2019-08-11 ENCOUNTER — APPOINTMENT (OUTPATIENT)
Dept: CARDIOLOGY | Facility: MEDICAL CENTER | Age: 84
End: 2019-08-11
Attending: INTERNAL MEDICINE
Payer: MEDICARE

## 2019-08-11 LAB
ANION GAP SERPL CALC-SCNC: 11 MMOL/L (ref 0–11.9)
BUN SERPL-MCNC: 37 MG/DL (ref 8–22)
CALCIUM SERPL-MCNC: 9.1 MG/DL (ref 8.5–10.5)
CHLORIDE SERPL-SCNC: 107 MMOL/L (ref 96–112)
CO2 SERPL-SCNC: 21 MMOL/L (ref 20–33)
CREAT SERPL-MCNC: 1.26 MG/DL (ref 0.5–1.4)
ERYTHROCYTE [DISTWIDTH] IN BLOOD BY AUTOMATED COUNT: 49.6 FL (ref 35.9–50)
GLUCOSE SERPL-MCNC: 85 MG/DL (ref 65–99)
HCT VFR BLD AUTO: 43.1 % (ref 37–47)
HGB BLD-MCNC: 13.4 G/DL (ref 12–16)
LV EJECT FRACT  99904: 65
LV EJECT FRACT MOD 2C 99903: 76.38
LV EJECT FRACT MOD 4C 99902: 67.3
LV EJECT FRACT MOD BP 99901: 73.92
MAGNESIUM SERPL-MCNC: 2.1 MG/DL (ref 1.5–2.5)
MCH RBC QN AUTO: 32.1 PG (ref 27–33)
MCHC RBC AUTO-ENTMCNC: 31.1 G/DL (ref 33.6–35)
MCV RBC AUTO: 103.4 FL (ref 81.4–97.8)
PLATELET # BLD AUTO: 118 K/UL (ref 164–446)
PMV BLD AUTO: 9.4 FL (ref 9–12.9)
POTASSIUM SERPL-SCNC: 4.2 MMOL/L (ref 3.6–5.5)
RBC # BLD AUTO: 4.17 M/UL (ref 4.2–5.4)
SODIUM SERPL-SCNC: 139 MMOL/L (ref 135–145)
WBC # BLD AUTO: 7.7 K/UL (ref 4.8–10.8)

## 2019-08-11 PROCEDURE — A9270 NON-COVERED ITEM OR SERVICE: HCPCS | Performed by: INTERNAL MEDICINE

## 2019-08-11 PROCEDURE — G0378 HOSPITAL OBSERVATION PER HR: HCPCS

## 2019-08-11 PROCEDURE — 700102 HCHG RX REV CODE 250 W/ 637 OVERRIDE(OP): Performed by: INTERNAL MEDICINE

## 2019-08-11 PROCEDURE — 80048 BASIC METABOLIC PNL TOTAL CA: CPT

## 2019-08-11 PROCEDURE — 93306 TTE W/DOPPLER COMPLETE: CPT

## 2019-08-11 PROCEDURE — 99225 PR SUBSEQUENT OBSERVATION CARE,LEVEL II: CPT | Performed by: INTERNAL MEDICINE

## 2019-08-11 PROCEDURE — 93306 TTE W/DOPPLER COMPLETE: CPT | Mod: 26 | Performed by: INTERNAL MEDICINE

## 2019-08-11 PROCEDURE — 700111 HCHG RX REV CODE 636 W/ 250 OVERRIDE (IP): Performed by: INTERNAL MEDICINE

## 2019-08-11 PROCEDURE — 85027 COMPLETE CBC AUTOMATED: CPT

## 2019-08-11 PROCEDURE — 96372 THER/PROPH/DIAG INJ SC/IM: CPT

## 2019-08-11 PROCEDURE — 83735 ASSAY OF MAGNESIUM: CPT

## 2019-08-11 PROCEDURE — 36415 COLL VENOUS BLD VENIPUNCTURE: CPT

## 2019-08-11 RX ORDER — AMLODIPINE BESYLATE 5 MG/1
5 TABLET ORAL ONCE
Status: COMPLETED | OUTPATIENT
Start: 2019-08-11 | End: 2019-08-11

## 2019-08-11 RX ORDER — AMLODIPINE BESYLATE 10 MG/1
10 TABLET ORAL
Status: DISCONTINUED | OUTPATIENT
Start: 2019-08-12 | End: 2019-08-12 | Stop reason: HOSPADM

## 2019-08-11 RX ADMIN — LEVOTHYROXINE SODIUM 100 MCG: 100 TABLET ORAL at 05:50

## 2019-08-11 RX ADMIN — AMLODIPINE BESYLATE 5 MG: 5 TABLET ORAL at 11:41

## 2019-08-11 RX ADMIN — ASPIRIN 81 MG: 81 TABLET, COATED ORAL at 05:50

## 2019-08-11 RX ADMIN — LOSARTAN POTASSIUM 100 MG: 50 TABLET ORAL at 21:05

## 2019-08-11 RX ADMIN — PREDNISONE 2.5 MG: 2.5 TABLET ORAL at 05:54

## 2019-08-11 RX ADMIN — ENOXAPARIN SODIUM 40 MG: 100 INJECTION SUBCUTANEOUS at 05:50

## 2019-08-11 RX ADMIN — SPIRONOLACTONE 25 MG: 25 TABLET ORAL at 05:50

## 2019-08-11 RX ADMIN — AMLODIPINE BESYLATE 5 MG: 5 TABLET ORAL at 05:50

## 2019-08-11 RX ADMIN — ATORVASTATIN CALCIUM 40 MG: 40 TABLET, FILM COATED ORAL at 21:06

## 2019-08-11 ASSESSMENT — ENCOUNTER SYMPTOMS
MYALGIAS: 0
HALLUCINATIONS: 0
SHORTNESS OF BREATH: 0
BLURRED VISION: 0
EYE PAIN: 0
CHILLS: 0
COUGH: 0
SPEECH CHANGE: 0
NECK PAIN: 0
HEADACHES: 0
DIZZINESS: 0
SPUTUM PRODUCTION: 0
FOCAL WEAKNESS: 0
TINGLING: 0
PHOTOPHOBIA: 0
PALPITATIONS: 0
NAUSEA: 0
TREMORS: 0
VOMITING: 0
ABDOMINAL PAIN: 0
WEIGHT LOSS: 0
SENSORY CHANGE: 0
CONSTIPATION: 0
DIARRHEA: 0
BACK PAIN: 0
ORTHOPNEA: 0
DOUBLE VISION: 0
FEVER: 0

## 2019-08-11 ASSESSMENT — LIFESTYLE VARIABLES: SUBSTANCE_ABUSE: 0

## 2019-08-11 NOTE — CARE PLAN
Problem: Communication  Goal: The ability to communicate needs accurately and effectively will improve  Outcome: PROGRESSING AS EXPECTED  Note:   Call light within reach. Educated pt to use call light as needed. Have pt wear hearing aids and speak close to ear. Have pt repeat information to verify accurate communication. Reorient and re-educate as needed. Continue to monitor.      Problem: Safety  Goal: Will remain free from injury  Outcome: PROGRESSING AS EXPECTED  Note:   Provide assistance with mobility and use proper assistive devices. Educate pt about risk for injury and reinforce as needed. Continue to monitor.    Goal: Will remain free from falls  Outcome: PROGRESSING AS EXPECTED  Note:   Treaded socks and bed/strip alarm on. Call light within reach. Side rails up x 2. Pt educated to call for assistance. Reinforce as needed. Continue to monitor.

## 2019-08-11 NOTE — THERAPY
"Physical Therapy Evaluation completed.   Bed Mobility:  Supine to Sit: Supervised  Transfers: Sit to Stand: Supervised  Gait: Level Of Assist: Stand by Assist with Front-Wheel Walker       Plan of Care: Will benefit from Physical Therapy 2 times per week  Discharge Recommendations: Equipment: Front-Wheel Walker.     Pt admitted for c/c fatigue with hypertensive urgency. Pt demonstrates some impairment with dyanmic balance and tolerance for activity however is performing well. Pt is able to navigate environment without LOB or significant deviations; pt has been living at group home and reports she is currently feeling better than before admission from a physical perspective. Pt has a history of falls impacted by tendency of L LE to 'buckle' and result in a fall toward the L. Education was provided regarding fall risk and maintaining joint mobility. Anticipate pt will be able to dc home once deemed medically appropriate; will continue to follow.     See \"Rehab Therapy-Acute\" Patient Summary Report for complete documentation.     "

## 2019-08-11 NOTE — PROGRESS NOTES
Assumed care at 0700. Bedside report received from Lela. Patient's chart and MAR reviewed. Pt denies pain at this time. Pt is A & O 4. Patient was updated on plan of care for the day. Questions answered and concerns addressed.  Pt denies any additional needs at this time. White board updated. Call light, phone and personal belongings within reach.

## 2019-08-11 NOTE — THERAPY
"Occupational Therapy Evaluation completed.   Functional Status:  Pt presents to skilled OT service following hypertensive urgency, acute on chronic weakness. Pt was able to perform basic self care tasks, functional t/f's and mobility with sba/min a. Pt declines any dizziness and HA during session. Per pt, has continuous assist from staff with ADLs, uses BSC and pushes w/c around the GH. Pt doesn't present the need for acute OT needs at this time and might benefit from HH OT upon d/c from hospital.    Plan of Care: Patient with no further skilled OT needs in the acute care setting at this time  Discharge Recommendations:  Equipment: No Equipment Needed. Post-acute therapy OT evaluation completed. Patient is currently not being actively followed for occupational therapy services at this time. However, pt may be seen at the request of attending provider for an additional visit to address any discharge or equipment needs within 30 days from evaluation.      See \"Rehab Therapy-Acute\" Patient Summary Report for complete documentation.    "

## 2019-08-11 NOTE — PROGRESS NOTES
Received report from OTIS Harding. POC discussed. First assessment completed, call light within reach. Fall precautions within place. Will continue to monitor.

## 2019-08-12 ENCOUNTER — HOME HEALTH ADMISSION (OUTPATIENT)
Dept: HOME HEALTH SERVICES | Facility: HOME HEALTHCARE | Age: 84
End: 2019-08-12
Payer: MEDICARE

## 2019-08-12 ENCOUNTER — PATIENT OUTREACH (OUTPATIENT)
Dept: HEALTH INFORMATION MANAGEMENT | Facility: OTHER | Age: 84
End: 2019-08-12

## 2019-08-12 VITALS
HEIGHT: 65 IN | SYSTOLIC BLOOD PRESSURE: 157 MMHG | DIASTOLIC BLOOD PRESSURE: 70 MMHG | WEIGHT: 166.89 LBS | TEMPERATURE: 97 F | BODY MASS INDEX: 27.81 KG/M2 | HEART RATE: 80 BPM | OXYGEN SATURATION: 97 % | RESPIRATION RATE: 18 BRPM

## 2019-08-12 PROBLEM — R73.9 HYPERGLYCEMIA: Status: RESOLVED | Noted: 2019-08-10 | Resolved: 2019-08-12

## 2019-08-12 PROBLEM — R53.1 WEAKNESS: Status: RESOLVED | Noted: 2019-08-10 | Resolved: 2019-08-12

## 2019-08-12 LAB
ANION GAP SERPL CALC-SCNC: 9 MMOL/L (ref 0–11.9)
BUN SERPL-MCNC: 40 MG/DL (ref 8–22)
CALCIUM SERPL-MCNC: 8.9 MG/DL (ref 8.5–10.5)
CHLORIDE SERPL-SCNC: 108 MMOL/L (ref 96–112)
CO2 SERPL-SCNC: 20 MMOL/L (ref 20–33)
CREAT SERPL-MCNC: 1.16 MG/DL (ref 0.5–1.4)
ERYTHROCYTE [DISTWIDTH] IN BLOOD BY AUTOMATED COUNT: 45.9 FL (ref 35.9–50)
GLUCOSE SERPL-MCNC: 94 MG/DL (ref 65–99)
HCT VFR BLD AUTO: 39.1 % (ref 37–47)
HGB BLD-MCNC: 13 G/DL (ref 12–16)
MCH RBC QN AUTO: 32.3 PG (ref 27–33)
MCHC RBC AUTO-ENTMCNC: 33.2 G/DL (ref 33.6–35)
MCV RBC AUTO: 97.3 FL (ref 81.4–97.8)
PLATELET # BLD AUTO: 186 K/UL (ref 164–446)
PMV BLD AUTO: 9.6 FL (ref 9–12.9)
POTASSIUM SERPL-SCNC: 4.4 MMOL/L (ref 3.6–5.5)
RBC # BLD AUTO: 4.02 M/UL (ref 4.2–5.4)
SODIUM SERPL-SCNC: 137 MMOL/L (ref 135–145)
TSH SERPL DL<=0.005 MIU/L-ACNC: 1.84 UIU/ML (ref 0.38–5.33)
WBC # BLD AUTO: 7.8 K/UL (ref 4.8–10.8)

## 2019-08-12 PROCEDURE — 700102 HCHG RX REV CODE 250 W/ 637 OVERRIDE(OP): Performed by: INTERNAL MEDICINE

## 2019-08-12 PROCEDURE — G0378 HOSPITAL OBSERVATION PER HR: HCPCS

## 2019-08-12 PROCEDURE — A9270 NON-COVERED ITEM OR SERVICE: HCPCS | Performed by: INTERNAL MEDICINE

## 2019-08-12 PROCEDURE — 97116 GAIT TRAINING THERAPY: CPT

## 2019-08-12 PROCEDURE — 97535 SELF CARE MNGMENT TRAINING: CPT

## 2019-08-12 PROCEDURE — 80048 BASIC METABOLIC PNL TOTAL CA: CPT

## 2019-08-12 PROCEDURE — 700111 HCHG RX REV CODE 636 W/ 250 OVERRIDE (IP): Performed by: INTERNAL MEDICINE

## 2019-08-12 PROCEDURE — 99217 PR OBSERVATION CARE DISCHARGE: CPT | Performed by: INTERNAL MEDICINE

## 2019-08-12 PROCEDURE — 36415 COLL VENOUS BLD VENIPUNCTURE: CPT

## 2019-08-12 PROCEDURE — 84443 ASSAY THYROID STIM HORMONE: CPT

## 2019-08-12 PROCEDURE — 96372 THER/PROPH/DIAG INJ SC/IM: CPT

## 2019-08-12 PROCEDURE — 85027 COMPLETE CBC AUTOMATED: CPT

## 2019-08-12 RX ORDER — AMLODIPINE BESYLATE 10 MG/1
10 TABLET ORAL DAILY
Qty: 30 TAB | Refills: 1 | Status: SHIPPED | OUTPATIENT
Start: 2019-08-13 | End: 2019-08-12

## 2019-08-12 RX ORDER — AMLODIPINE BESYLATE 10 MG/1
10 TABLET ORAL DAILY
Qty: 30 TAB | Refills: 1 | Status: SHIPPED | OUTPATIENT
Start: 2019-08-13

## 2019-08-12 RX ADMIN — ASPIRIN 81 MG: 81 TABLET, COATED ORAL at 05:32

## 2019-08-12 RX ADMIN — SPIRONOLACTONE 25 MG: 25 TABLET ORAL at 05:33

## 2019-08-12 RX ADMIN — ENOXAPARIN SODIUM 40 MG: 100 INJECTION SUBCUTANEOUS at 05:35

## 2019-08-12 RX ADMIN — LEVOTHYROXINE SODIUM 100 MCG: 100 TABLET ORAL at 05:38

## 2019-08-12 RX ADMIN — AMLODIPINE BESYLATE 10 MG: 10 TABLET ORAL at 05:33

## 2019-08-12 RX ADMIN — PREDNISONE 2.5 MG: 2.5 TABLET ORAL at 05:33

## 2019-08-12 ASSESSMENT — COGNITIVE AND FUNCTIONAL STATUS - GENERAL
MOBILITY SCORE: 22
SUGGESTED CMS G CODE MODIFIER MOBILITY: CJ
CLIMB 3 TO 5 STEPS WITH RAILING: A LITTLE
WALKING IN HOSPITAL ROOM: A LITTLE

## 2019-08-12 ASSESSMENT — GAIT ASSESSMENTS
GAIT LEVEL OF ASSIST: SUPERVISED
ASSISTIVE DEVICE: FRONT WHEEL WALKER
DISTANCE (FEET): 150

## 2019-08-12 NOTE — DISCHARGE PLANNING
Received Choice form at 1605  Agency/Facility Name: [facility]  Referral sent per Choice form at 1615

## 2019-08-12 NOTE — DISCHARGE PLANNING
ATTN: Case Management  RE: Referral for Home Health    As of 08/12/2019, we have accepted the Home Health referral for the patient listed above.    A [facility] clinician will be out to see the patient within 48 hours. If you have any questions or concerns regarding the patient’s transition to Home Health, please do not hesitate to contact us at [phone].      We look forward to collaborating with you,  [facility] Home Health Team

## 2019-08-12 NOTE — PROGRESS NOTES
Assumed care of patient, bedside report received from Bambi BERG. Updated on POC, call light within reach and fall precautions in place. Bed locked and in lowest position. Patient instructed to call for assistance before getting out of bed. All questions answered, no other needs at this time.

## 2019-08-12 NOTE — THERAPY
"Physical Therapy Treatment completed.   Bed Mobility:  Supine to Sit: Supervised  Transfers: Sit to Stand: Supervised  Gait: Level Of Assist: Supervised with Front-Wheel Walker       Plan of Care: Patient with no further skilled PT needs in the acute care setting at this time and Patient demonstrates safety with mobility in this environment at this time.   Discharge Recommendations: Equipment: No Equipment Needed. Post-acute therapy Discharge to home with outpatient or home health for additional skilled therapy services.     See \"Rehab Therapy-Acute\" Patient Summary Report for complete documentation.     Pt currently mobilizing at supervision level and appears safe to return to group home with home health therapy to work on higher level balance and endurance.  Pt with no further acute PT needs at this time, recommend DC home with home health.    "

## 2019-08-12 NOTE — DISCHARGE PLANNING
Care Transition Team Assessment    In the case of an emergency, pt's DPOA is Urban Loyd (son) [phone].     This RNCM spoke with Pt at bedside and obtained the information used in this assessment. Pt verified accuracy of facesheet. Pt lives in a Group Home by the name of [facility]. Pt uses the [facility] but daughter requests that new prescriptions be sent to [facility] on [address]. Prior to current hospitalization, pt required moderate assist with ADLS and IADLS. Pt uses wheelchair as a walker to mobilize. Pt's son drives her to and from [facility]. Pt denies history of SA or MH. Pt has a good family support system. Pt has an AD on file.      Upon D/C, pt states that her daughter Alexis , will provide transport to [facility].    Information Source  Orientation : Oriented x 4  Information Given By: Patient  Informant's Name: Felipa  Who is responsible for making decisions for patient? : Patient    Readmission Evaluation  Is this a readmission?: No    Elopement Risk  Legal Hold: No  Ambulatory or Self Mobile in Wheelchair: Yes  Disoriented: No  Psychiatric Symptoms: None  History of Wandering: No  Elopement this Admit: No  Vocalizing Wanting to Leave: No  Displays Behaviors, Body Language Wanting to Leave: No-Not at Risk for Elopement  Elopement Risk: Not at Risk for Elopement    Interdisciplinary Discharge Planning  Primary Care Physician: Saul Coats PA  Lives with - Patient's Self Care Capacity: Attendant / Paid Care Giver, Alone and Unable to Care For Self  Patient or legal guardian wants to designate a caregiver (see row info): No  Housing / Facility: long term  Name of Care Facility: [facility]  Do You Take your Prescribed Medications Regularly: Yes  Able to Return to Previous ADL's: Yes  Mobility Issues: Yes(uses wheelchair as a walker)  Prior Services: Continuous (24 Hour) Care Giving Per Service  Patient Expects to be Discharged  to:: Group Home  Assistance Needed: Yes  Durable Medical Equipment: Other - Specify(wheelchair)  DME Provider / Phone: BRUCE    Discharge Preparedness  What is your plan after discharge?: FDC  What are your discharge supports?: Child  Prior Functional Level: Uses Walker, Needs Assist with Activities of Daily Living, Uses Wheelchair  Difficulity with ADLs: Bathing, Dressing, Walking  Difficulty with ADLs Comment: Group Home Staff Assist  Difficulity with IADLs: Cooking, Driving, Keeping track of finances, Laundry, Managing medication, Shopping, Using the telephone or computer  Difficulity with IADL Comments: Group Home Staff Assist, Son deals with Finances    Functional Assesment  Prior Functional Level: Uses Walker, Needs Assist with Activities of Daily Living, Uses Wheelchair    Finances  Financial Barriers to Discharge: No  Prescription Coverage: Yes    Vision / Hearing Impairment  Vision Impairment : No  Left Eye Vision: (P) Impaired, Wears Glasses  Hearing Impairment : Yes  Hearing Impairment: (P) Hearing Device Not Available, Both Ears  Does Pt Need Special Equipment for the Hearing Impaired?: No         Advance Directive  Advance Directive?: DPOA for Health Care  Durable Power of  Name and Contact : Rach Judd    Domestic Abuse  Have you ever been the victim of abuse or violence?: No  Physical Abuse or Sexual Abuse: No  Verbal Abuse or Emotional Abuse: No  Possible Abuse Reported to:: Not Applicable    Psychological Assessment  History of Substance Abuse: None  History of Psychiatric Problems: No  Non-compliant with Treatment: No  Newly Diagnosed Illness: Yes    Discharge Risks or Barriers  Discharge risks or barriers?: No    Anticipated Discharge Information  Anticipated discharge disposition: Group home  Discharge Address: [address] [place] [address] [place]  Discharge Contact Phone Number: [phone]

## 2019-08-12 NOTE — FACE TO FACE
Face to Face Supporting Documentation - Home Health    The encounter with this patient was in whole or in part the primary reason for home health admission.    Date of encounter:   Patient:                    MRN:                       YOB: 2019  Felipa Loyd  [id number]  9/8/1932     Home health to see patient for:  Skilled Nursing care for assessment, interventions & education, Physical Therapy evaluation and treatment and Occupational therapy evaluation and treatment    Skilled need for:  Comment: Generalized weakness and elevated blood pressure    Skilled nursing interventions to include:  Comment: Physical therapy and occupational therapy    Homebound status evidenced by:  Need the aid of supportive devices such as crutches, canes, wheelchairs or walkers. Leaving home requires a considerable and taxing effort. There is a normal inability to leave the home.    Community Physician to provide follow up care: SHALINI Perez     Optional Interventions? No      I certify the face to face encounter for this home health care referral meets the CMS requirements and the encounter/clinical assessment with the patient was, in whole, or in part, for the medical condition(s) listed above, which is the primary reason for home health care. Based on my clinical findings: the service(s) are medically necessary, support the need for home health care, and the homebound criteria are met.  I certify that this patient has had a face to face encounter by myself.  Todd Schulz M.D. - DIEGOI: [phone]

## 2019-08-12 NOTE — PROGRESS NOTES
Hospital Medicine Daily Progress Note    Date of Service  8/11/2019    Chief Complaint  86 y.o. female admitted 8/9/2019 with fatigue    Hospital Course    *86-year-old female with past medical history of chronic kidney disease, hypertension, dyslipidemia and hypothyroidism presented to the hospital with complaint of fatigue.  On presentation she found to have significantly elevated blood pressure systolic blood pressure was 200s on presentation.  She also found to have mildly elevated troponin.*      Interval Problem Update  I evaluated and examined her at the bedside.  She reported that she is feeling better but she feels tired.  Physical therapy and outpatient therapy evaluated her and made recommendations per  Her blood pressure has been running high and I increase her dose of amlodipine to 10 mg  Patient reported that she cannot take hydralazine and lisinopril.  I discussed plan of care with her and RN at the bedside.    Consultants/Specialty  None    Code Status  Full code    Disposition  To be decided    Review of Systems  Review of Systems   Constitutional: Positive for malaise/fatigue. Negative for chills, fever and weight loss.   HENT: Negative for hearing loss and tinnitus.    Eyes: Negative for blurred vision, double vision, photophobia and pain.   Respiratory: Negative for cough, sputum production and shortness of breath.    Cardiovascular: Negative for chest pain, palpitations, orthopnea and leg swelling.   Gastrointestinal: Negative for abdominal pain, constipation, diarrhea, nausea and vomiting.   Genitourinary: Negative for dysuria, frequency and urgency.   Musculoskeletal: Negative for back pain, joint pain, myalgias and neck pain.   Skin: Negative for rash.   Neurological: Negative for dizziness, tingling, tremors, sensory change, speech change, focal weakness and headaches.   Psychiatric/Behavioral: Negative for hallucinations and substance abuse.   All other systems reviewed and are negative.        Physical Exam  Temp:  [36 °C (96.8 °F)-36.2 °C (97.2 °F)] 36.2 °C (97.2 °F)  Pulse:  [57-82] 82  Resp:  [16-18] 18  BP: (128-164)/(60-75) 158/75  SpO2:  [95 %-97 %] 97 %    Physical Exam   Constitutional: She is oriented to person, place, and time. No distress.   She is sitting in bed in solving puzzle.   HENT:   Head: Normocephalic and atraumatic.   Eyes: Pupils are equal, round, and reactive to light. Right eye exhibits no discharge. Left eye exhibits no discharge.   Neck: Normal range of motion. Neck supple.   Cardiovascular: Normal rate, regular rhythm and normal heart sounds. Exam reveals no friction rub.   No murmur heard.  Pulmonary/Chest: Effort normal and breath sounds normal. No respiratory distress. She has no wheezes. She has no rales.   Abdominal: Soft. Bowel sounds are normal. She exhibits no distension. There is no tenderness. There is no rebound.   Musculoskeletal: Normal range of motion. She exhibits no edema or tenderness.   Neurological: She is alert and oriented to person, place, and time. No cranial nerve deficit.   No focal neurological deficit.   Skin: Skin is warm and dry. No rash noted. She is not diaphoretic. No erythema.   Psychiatric: She has a normal mood and affect. Her behavior is normal.   I performed physical exam on August 11, 2019 and it remains clear without any acute disease from yesterday.    Fluids  No intake or output data in the 24 hours ending 08/11/19 1702    Laboratory  Recent Labs     08/09/19 1925 08/11/19 0054   WBC 6.4 7.7   RBC 4.36 4.17*   HEMOGLOBIN 14.1 13.4   HEMATOCRIT 41.9 43.1   MCV 96.1 103.4*   MCH 32.3 32.1   MCHC 33.7 31.1*   RDW 45.2 49.6   PLATELETCT 181 118*   MPV 9.3 9.4     Recent Labs     08/09/19 1925 08/11/19 0054   SODIUM 139 139   POTASSIUM 4.6 4.2   CHLORIDE 108 107   CO2 22 21   GLUCOSE 111* 85   BUN 29* 37*   CREATININE 1.18 1.26   CALCIUM 9.4 9.1                   Imaging  EC-ECHOCARDIOGRAM COMPLETE W/O CONT   Final Result            Assessment/Plan  Hypertensive urgency- (present on admission)  Assessment & Plan  Presentation she found to have significantly elevated systolic blood pressure which was more than 200 mmHg.  Continue outpatient blood pressure medications including losartan.  PRN enalapril, hydralazine and labetalol as needed with parameters.  Echocardiogram ordered and currently its pending.  I increase the dose of amlodipine from 5 mg to 10 mg.  Her echocardiogram showed Left ventricular ejection fraction is visually estimated to be 65%.    Dyslipidemia- (present on admission)  Assessment & Plan  Continue outpatient statin.    Stage 3 chronic kidney disease (HCC)- (present on admission)  Assessment & Plan  She is currently at her baseline.  Avoid nephrotoxins  Medication dose adjustment as per renal function.      Acquired hypothyroidism- (present on admission)  Assessment & Plan  Continue outpatient Synthroid 100 MCG daily.  I reviewed his recent TSH is within normal limits  I ordered TSH.      Hyperglycemia  Assessment & Plan  Mild  Continue monitor    Elevated troponin  Assessment & Plan  Mild elevation, associated with a slightly larger elevation in BNP  Echocardiogram was ordered and currently its pending.  Continue to monitor.  Mildly elevated troponin most likely secondary to hypertensive urgency.  He does not have any acute chest pain.  Continue to monitor on telemetry.  She denies any chest pain and had echocardiogram showed Left ventricular ejection fraction is visually estimated to be 65%.      Weakness  Assessment & Plan  Acute on chronic  PT/OT evaluation and recommendations per  Patient came from group home.      I discussed plan of care during multidisciplinary rounds.    VTE prophylaxis: Lovenox

## 2019-08-12 NOTE — DISCHARGE INSTRUCTIONS
Discharge Instructions  Discharged to group home by car with relative. Discharged via wheelchair, hospital escort: Yes.  Special equipment needed: Not Applicable    Be sure to schedule a follow-up appointment with your primary care doctor or any specialists as instructed.     Discharge Plan:   Diet Plan: Discussed  Activity Level: Discussed  Confirmed Follow up Appointment: Patient to Call and Schedule Appointment  Confirmed Symptoms Management: Discussed  Medication Reconciliation Updated: Yes  Influenza Vaccine Indication: Not indicated: Previously immunized this influenza season and > 8 years of age    I understand that a diet low in cholesterol, fat, and sodium is recommended for good health. Unless I have been given specific instructions below for another diet, I accept this instruction as my diet prescription.   Other diet: Regular    Special Instructions: None    · Is patient discharged on Warfarin / Coumadin?   No       Hypertension  Hypertension is another name for high blood pressure. High blood pressure forces your heart to work harder to pump blood. A blood pressure reading has two numbers, which includes a higher number over a lower number (example: 110/72).  Follow these instructions at home:  · Have your blood pressure rechecked by your doctor.  · Only take medicine as told by your doctor. Follow the directions carefully. The medicine does not work as well if you skip doses. Skipping doses also puts you at risk for problems.  · Do not smoke.  · Monitor your blood pressure at home as told by your doctor.  Contact a doctor if:  · You think you are having a reaction to the medicine you are taking.  · You have repeat headaches or feel dizzy.  · You have puffiness (swelling) in your ankles.  · You have trouble with your vision.  Get help right away if:  · You get a very bad headache and are confused.  · You feel weak, numb, or faint.  · You get chest or belly (abdominal) pain.  · You throw up  (vomit).  · You cannot breathe very well.  This information is not intended to replace advice given to you by your health care provider. Make sure you discuss any questions you have with your health care provider.  Document Released: 06/05/2009 Document Revised: 05/25/2017 Document Reviewed: 10/10/2014  NowPublic Interactive Patient Education © 2017 Elsevier Inc.        Amlodipine; Aliskiren; Hydrochlorothiazide, HCTZ oral tablets  What is this medicine?  AMLODIPINE; ALISKIREN; HYDROCHLOROTHIAZIDE (am DEVEN di peen; a lis TARUN babatunde; doe droe klor oh THYE a zide) is a combination of a calcium channel blocker, a renin inhibitor and a diuretic. It is used to treat high blood pressure.  This medicine may be used for other purposes; ask your health care provider or pharmacist if you have questions.  COMMON BRAND NAME(S): Amturnide  What should I tell my health care provider before I take this medicine?  They need to know if you have any of these conditions:  -dehydration  -diabetes  -heart disease  -kidney disease  -liver disease  -lupus  -an unusual or allergic reaction to amlodipine; aliskiren; hydrochlorothiazide, HCTZ, sulfa drugs, other medicines, foods, dyes, or preservatives  -pregnant or trying to get pregnant  -breast-feeding  How should I use this medicine?  Take this medicine by mouth with a glass of water. Follow the directions on the prescription label. You can take this medicine with or without food. However, you should always take it the same way each time. Take your medicine at regular intervals. Do not take it more often than directed. Do not stop taking except on your doctor's advice.  Talk to your pediatrician regarding the use of this medicine in children. Special care may be needed.  Overdosage: If you think you have taken too much of this medicine contact a poison control center or emergency room at once.  NOTE: This medicine is only for you. Do not share this medicine with others.  What if I miss a  dose?  If you miss a dose, take it as soon as you can. If it is almost time for your next dose, take only that dose. Do not take double or extra doses.  What may interact with this medicine?  -alcohol  -atorvastatin  -barbiturates  -certain medicines for fungal infections like ketoconazole and itraconazole  -cholestyramine  -colestipol  -cyclosporine  -furosemide  -lithium  -medicines for blood pressure  -medicines for diabetes  -medicines that relax muscles for surgery  -narcotic medicines for pain  -NSAIDs, medicines for pain and inflammation, like ibuprofen or naproxen  -potassium supplements  -steroid medicines like prednisone or cortisone  This list may not describe all possible interactions. Give your health care provider a list of all the medicines, herbs, non-prescription drugs, or dietary supplements you use. Also tell them if you smoke, drink alcohol, or use illegal drugs. Some items may interact with your medicine.  What should I watch for while using this medicine?  Visit your doctor for regular check ups. Check your blood pressure as directed. Ask your doctor what your blood pressure should be and when you should contact him or her.  Women should inform their doctor if they wish to become pregnant or think they might be pregnant. There is a potential for serious side effects to an unborn child. Talk to your health care professional or pharmacist for more information.  You may need to be on a special diet while taking this medicine. Ask your doctor.  Check with your doctor or health care professional if you get an attack of severe diarrhea, nausea and vomiting, or if you sweat a lot. The loss of too much body fluid can make it dangerous for you to take this medicine.  You may get drowsy or dizzy. Do not drive, use machinery, or do anything that needs mental alertness until you know how this medicine affects you. Do not stand or sit up quickly, especially if you are an older patient. This reduces the risk  of dizzy or fainting spells. Alcohol may interfere with the effect of this medicine. Avoid alcoholic drinks.  This medicine may affect your blood sugar level. If you have diabetes, check with your doctor or health care professional before changing the dose of your diabetic medicine. Do not take this medicine if you have diabetes and are taking a medicine called an angiotensin-receptor-blocker (ARB) or angiotensin-converting-enzyme-inhibitor (ACE inhibitor). Talk to your doctor or health care professional for more information.  This medicine can make you more sensitive to the sun. Keep out of the sun. If you cannot avoid being in the sun, wear protective clothing and use sunscreen. Do not use sun lamps or tanning beds/booths.  What side effects may I notice from receiving this medicine?  Side effects that you should report to your doctor or health care professional as soon as possible:  -allergic reactions like skin rash or hives, swelling of the hands, feet, face, lips, throat, or tongue  -breathing problems  -changes in vision  -chest pain or chest tightness  -eye pain  -feeling faint or lightheaded, falls  -low blood pressure  -muscle pain  -pain or difficulty passing urine  -redness, blistering, peeling or loosening of the skin, including inside the mouth  Side effects that usually do not require medical attention (report to your doctor or health care professional if they continue or are bothersome):  -change in sex drive or performance  -diarrhea  -flu-like symptoms  -headache  -upset stomach  This list may not describe all possible side effects. Call your doctor for medical advice about side effects. You may report side effects to FDA at 9-130-FDA-8840.  Where should I keep my medicine?  Keep out of the reach of children.  Store at room temperature between 15 and 30 degrees C (59 and 86 degrees F). Protect from moisture. Throw away any unused medicine after the expiration date.  NOTE: This sheet is a summary.  It may not cover all possible information. If you have questions about this medicine, talk to your doctor, pharmacist, or health care provider.  © 2018 Elsevier/Gold Standard (2017-01-19 10:27:15)      Depression / Suicide Risk    As you are discharged from this [facility] facility, it is important to learn how to keep safe from harming yourself.    Recognize the warning signs:  · Abrupt changes in personality, positive or negative- including increase in energy   · Giving away possessions  · Change in eating patterns- significant weight changes-  positive or negative  · Change in sleeping patterns- unable to sleep or sleeping all the time   · Unwillingness or inability to communicate  · Depression  · Unusual sadness, discouragement and loneliness  · Talk of wanting to die  · Neglect of personal appearance   · Rebelliousness- reckless behavior  · Withdrawal from people/activities they love  · Confusion- inability to concentrate     If you or a loved one observes any of these behaviors or has concerns about self-harm, here's what you can do:  · Talk about it- your feelings and reasons for harming yourself  · Remove any means that you might use to hurt yourself (examples: pills, rope, extension cords, firearm)  · Get professional help from the community (Mental Health, Substance Abuse, psychological counseling)  · Do not be alone:Call your Safe Contact- someone whom you trust who will be there for you.  · Call your local CRISIS HOTLINE 789-6617 or 670-263-1225  · Call your local Children's Mobile Crisis Response Team [place] (245) 837-4194 or www.[employer]  · Call the toll free National Suicide Prevention Hotlines   · National Suicide Prevention Lifeline 700-520-FEVY (1249)  · National Hope Line Network 800-SUICIDE (086-0313)            DISCHARGE INSTRUCTIONS PER Todd Schulz M.D.    Please check your blood pressure twice daily and write it down on the paper and make a blood pressure log and  bring it to your primary care appointment.  Please take medication as prescribed.  If you notice any new medical problems or acute symptoms please go to the nearest emergency room.

## 2019-08-12 NOTE — CARE PLAN
Problem: Communication  Goal: The ability to communicate needs accurately and effectively will improve  Outcome: PROGRESSING AS EXPECTED     Problem: Safety  Goal: Will remain free from injury  Outcome: PROGRESSING AS EXPECTED

## 2019-08-12 NOTE — PROGRESS NOTES
Bedside report received. Patient A&O x4. No complaints of pain. POC discussed with patient. Patient verbalized understanding. Call light and belongings within reach. Bed locked and in lowest position, alarm and fall precautions in place.

## 2019-08-13 ENCOUNTER — PATIENT OUTREACH (OUTPATIENT)
Dept: HEALTH INFORMATION MANAGEMENT | Facility: OTHER | Age: 84
End: 2019-08-13

## 2019-08-13 NOTE — DISCHARGE SUMMARY
Discharge Summary    CHIEF COMPLAINT ON ADMISSION  Chief Complaint   Patient presents with   • Hypertension     x2d       Reason for Admission  Fatigue    Admission Date  8/9/2019    CODE STATUS  Full Code    HPI & HOSPITAL COURSE    86-year-old female with past medical history of chronic kidney disease, hypertension, dyslipidemia and hypothyroidism presented to the hospital with complaint of fatigue.  On presentation she found to have significantly elevated blood pressure systolic blood pressure was 200s on presentation.  She also found to have mildly elevated troponin.  During her hospitalization her blood pressure has been improving and she reported that her doctor told her not to take hydralazine and I started her on amlodipine and further increase her dose to 10 mg daily.  Her blood pressure has significantly improved.  She underwent echocardiogram and it did not show any acute abnormalities.  Physical therapy and occupational therapy evaluated her and made recommendations.  Today on the day of discharge I evaluated and examined her at the bedside she expressed that she is feeling significantly better and denies any acute complaints.  Later today when patient was about her discharge she reported that she feels tired and she is concerned that if she will be discharged to group home there will not be medical professional over there.  I ordered home health for her and physical therapy evaluated her.  Patient's family discussed with patient and she agreeable to discharge to group home.  I recommended her close follow-up with primary care provider and she needs to make blood pressure log and bring it to the primary care provider for further adjustment of her blood pressure medications.  I check a TSH and it came back within normal limits.    Therefore, she is discharged in fair and stable condition to home with organized home healthcare and close outpatient follow-up.    Discharge Date  8/12/2019    FOLLOW UP ITEMS  POST DISCHARGE  Primary care provider for further adjustment of blood pressure medications    DISCHARGE DIAGNOSES  Active Problems:    Hypertensive urgency POA: Yes    Acquired hypothyroidism (Chronic) POA: Yes    Stage 3 chronic kidney disease (HCC) (Chronic) POA: Yes    Dyslipidemia (Chronic) POA: Yes    Elevated troponin POA: Unknown  Resolved Problems:    Weakness POA: Unknown    Hyperglycemia POA: Unknown      FOLLOW UP    SHALINI Perez  [address]  [place] [address]  [phone]    Go on 8/15/2019   Dr will see you on his rounds that day. Thank you       MEDICATIONS ON DISCHARGE     Medication List      START taking these medications      Instructions   amLODIPine 10 MG Tabs  Start taking on:  8/13/2019  Commonly known as:  NORVASC   Take 1 Tab by mouth every day.  Dose:  10 mg        CONTINUE taking these medications      Instructions   aspirin EC 81 MG Tbec  Commonly known as:  ECOTRIN   Take 81 mg by mouth every day.  Dose:  81 mg     atorvastatin 40 MG Tabs  Commonly known as:  LIPITOR   Take 40 mg by mouth every evening.  Dose:  40 mg     CULTURELLE DIGESTIVE HEALTH PO   Take 1 Cap by mouth every day.  Dose:  1 Cap     levothyroxine 100 MCG Tabs  Commonly known as:  SYNTHROID   Take 100 mcg by mouth Every morning on an empty stomach.  Dose:  100 mcg     losartan 100 MG Tabs  Commonly known as:  COZAAR   Take 100 mg by mouth every bedtime.  Dose:  100 mg     magnesium hydroxide 400 MG/5ML Susp  Commonly known as:  MILK OF MAGNESIA   Take 30 mL by mouth 1 time daily as needed (Constipation).  Dose:  30 mL     predniSONE 5 MG Tabs  Commonly known as:  DELTASONE   Take 2.5 mg by mouth every day. Maintenance Medication.  Dose:  2.5 mg     spironolactone 25 MG Tabs  Commonly known as:  ALDACTONE   Take 25 mg by mouth every day.  Dose:  25 mg     vitamin D 1000 UNIT Tabs  Commonly known as:  cholecalciferol   Take 2,000 Units by mouth every day.  Dose:  2,000 Units        STOP taking these  medications    meloxicam 15 MG tablet  Commonly known as:  MOBIC            Allergies  Allergies   Allergen Reactions   • Hydralazine      Pt states caused a rare reaction. 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   • Lisinopril Diarrhea, Nausea and Cough     GI problems, lost control   • Pcn [Penicillins] Hives     Reaction in 1967       DIET  Orders Placed This Encounter   Procedures   • Diet Order Regular     Standing Status:   Standing     Number of Occurrences:   1     Order Specific Question:   Diet:     Answer:   Regular [1]       ACTIVITY  As tolerated.  Weight bearing as tolerated    CONSULTATIONS  None    PROCEDURES  None    LABORATORY  Lab Results   Component Value Date    SODIUM 137 08/12/2019    POTASSIUM 4.4 08/12/2019    CHLORIDE 108 08/12/2019    CO2 20 08/12/2019    GLUCOSE 94 08/12/2019    BUN 40 (H) 08/12/2019    CREATININE 1.16 08/12/2019    CREATININE 0.9 09/01/2005        Lab Results   Component Value Date    WBC 7.8 08/12/2019    HEMOGLOBIN 13.0 08/12/2019    HEMATOCRIT 39.1 08/12/2019    PLATELETCT 186 08/12/2019        Total time of the discharge process exceeds 41 minutes.

## 2019-08-13 NOTE — PROGRESS NOTES
Patient discharged home. Patient AOX 4.  IV and tele box removed, discharge instructions provided to patient and reviewed with them. All questions answered, patient provided copy of discharge instructions and instructed on when to follow up with MD. Patient hesitant about going back to group home, patients daughter talked with patient and RN as well as MD. Patient more confident to go home. Daughter wanted HH due to PT saying could benefit although not necessary. Daughter did not want to wait and was okay to have it set up once they are already gone. Jazz notified. All belongings sent home with patient.

## 2019-08-14 ENCOUNTER — HOME CARE VISIT (OUTPATIENT)
Dept: HOME HEALTH SERVICES | Facility: HOME HEALTHCARE | Age: 84
End: 2019-08-14

## 2019-08-22 ENCOUNTER — HOME CARE VISIT (OUTPATIENT)
Dept: HOME HEALTH SERVICES | Facility: HOME HEALTHCARE | Age: 84
End: 2019-08-22
Payer: MEDICARE

## 2019-08-22 ENCOUNTER — ANTICOAGULATION MONITORING (OUTPATIENT)
Dept: MEDICAL GROUP | Facility: PHYSICIAN GROUP | Age: 84
End: 2019-08-22

## 2019-08-22 VITALS
WEIGHT: 166.2 LBS | RESPIRATION RATE: 16 BRPM | HEIGHT: 65 IN | HEART RATE: 60 BPM | OXYGEN SATURATION: 97 % | BODY MASS INDEX: 27.69 KG/M2 | DIASTOLIC BLOOD PRESSURE: 56 MMHG | SYSTOLIC BLOOD PRESSURE: 140 MMHG | TEMPERATURE: 97.6 F

## 2019-08-22 PROCEDURE — G0493 RN CARE EA 15 MIN HH/HOSPICE: HCPCS

## 2019-08-22 PROCEDURE — 665001 SOC-HOME HEALTH

## 2019-08-22 ASSESSMENT — PATIENT HEALTH QUESTIONNAIRE - PHQ9
2. FEELING DOWN, DEPRESSED, IRRITABLE, OR HOPELESS: 00
CLINICAL INTERPRETATION OF PHQ2 SCORE: 0
1. LITTLE INTEREST OR PLEASURE IN DOING THINGS: 00

## 2019-08-22 ASSESSMENT — ENCOUNTER SYMPTOMS
NAUSEA: DENIES
VOMITING: DENIES

## 2019-08-22 ASSESSMENT — ACTIVITIES OF DAILY LIVING (ADL): OASIS_M1830: 03

## 2019-08-22 NOTE — PROGRESS NOTES
Received referral from [facility]. Medications reviewed.       High   Drug-Drug: spironolactone and losartan   The risk of hyperkalemia may be increased when potassium-sparing diuretics are co-administered with angiotensin II receptor antagonists.     Lab Results   Component Value Date/Time    SODIUM 137 08/12/2019 12:29 AM    POTASSIUM 4.4 08/12/2019 12:29 AM    CHLORIDE 108 08/12/2019 12:29 AM    CO2 20 08/12/2019 12:29 AM    GLUCOSE 94 08/12/2019 12:29 AM    BUN 40 (H) 08/12/2019 12:29 AM    CREATININE 1.16 08/12/2019 12:29 AM    CREATININE 0.9 09/01/2005 08:29 AM            Devaughn Lemus M.S., Pharm.D., [facility], [facility]    This note was created using voice recognition software (Dragon). The accuracy of the dictation is limited by the abilities of the software. I have reviewed the note prior to signing, however some errors in grammar and context are still possible. If you have any questions related to this note please do not hesitate to contact our office.

## 2019-08-23 ENCOUNTER — HOME CARE VISIT (OUTPATIENT)
Dept: HOME HEALTH SERVICES | Facility: HOME HEALTHCARE | Age: 84
End: 2019-08-23
Payer: MEDICARE

## 2019-08-24 ENCOUNTER — HOME CARE VISIT (OUTPATIENT)
Dept: HOME HEALTH SERVICES | Facility: HOME HEALTHCARE | Age: 84
End: 2019-08-24
Payer: MEDICARE

## 2019-08-24 PROCEDURE — G0151 HHCP-SERV OF PT,EA 15 MIN: HCPCS

## 2019-08-25 VITALS
DIASTOLIC BLOOD PRESSURE: 62 MMHG | HEART RATE: 63 BPM | OXYGEN SATURATION: 99 % | SYSTOLIC BLOOD PRESSURE: 128 MMHG | TEMPERATURE: 97.7 F | RESPIRATION RATE: 18 BRPM

## 2019-08-25 ASSESSMENT — ACTIVITIES OF DAILY LIVING (ADL)
IADLS_COMMENTS: <!--EPICS-->SEE OT EVAL<!--EPICE-->
ADLS_COMMENTS: <!--EPICS-->SEE OT EVAL<!--EPICE-->

## 2019-08-26 ENCOUNTER — HOSPITAL ENCOUNTER (OUTPATIENT)
Dept: LAB | Facility: MEDICAL CENTER | Age: 84
End: 2019-08-26
Attending: PHYSICIAN ASSISTANT
Payer: MEDICARE

## 2019-08-26 ENCOUNTER — HOME CARE VISIT (OUTPATIENT)
Dept: HOME HEALTH SERVICES | Facility: HOME HEALTHCARE | Age: 84
End: 2019-08-26
Payer: MEDICARE

## 2019-08-26 LAB
ALBUMIN SERPL BCP-MCNC: 3.9 G/DL (ref 3.2–4.9)
ALP SERPL-CCNC: 63 U/L (ref 30–99)
ALT SERPL-CCNC: 51 U/L (ref 2–50)
ANION GAP SERPL CALC-SCNC: 9 MMOL/L (ref 0–11.9)
AST SERPL-CCNC: 51 U/L (ref 12–45)
BILIRUB CONJ SERPL-MCNC: 0.1 MG/DL (ref 0.1–0.5)
BILIRUB INDIRECT SERPL-MCNC: 0.4 MG/DL (ref 0–1)
BILIRUB SERPL-MCNC: 0.5 MG/DL (ref 0.1–1.5)
BUN SERPL-MCNC: 28 MG/DL (ref 8–22)
CALCIUM SERPL-MCNC: 9.6 MG/DL (ref 8.5–10.5)
CHLORIDE SERPL-SCNC: 108 MMOL/L (ref 96–112)
CO2 SERPL-SCNC: 22 MMOL/L (ref 20–33)
CREAT SERPL-MCNC: 1.26 MG/DL (ref 0.5–1.4)
GGT SERPL-CCNC: 43 U/L (ref 7–34)
GLUCOSE SERPL-MCNC: 99 MG/DL (ref 65–99)
POTASSIUM SERPL-SCNC: 4.3 MMOL/L (ref 3.6–5.5)
PROT SERPL-MCNC: 7.4 G/DL (ref 6–8.2)
SODIUM SERPL-SCNC: 139 MMOL/L (ref 135–145)

## 2019-08-26 PROCEDURE — 80048 BASIC METABOLIC PNL TOTAL CA: CPT

## 2019-08-26 PROCEDURE — 82977 ASSAY OF GGT: CPT

## 2019-08-26 PROCEDURE — 80076 HEPATIC FUNCTION PANEL: CPT

## 2019-08-26 PROCEDURE — 36415 COLL VENOUS BLD VENIPUNCTURE: CPT

## 2019-08-28 ENCOUNTER — HOME CARE VISIT (OUTPATIENT)
Dept: HOME HEALTH SERVICES | Facility: HOME HEALTHCARE | Age: 84
End: 2019-08-28
Payer: MEDICARE

## 2019-08-28 VITALS
RESPIRATION RATE: 18 BRPM | TEMPERATURE: 97.6 F | HEART RATE: 60 BPM | SYSTOLIC BLOOD PRESSURE: 130 MMHG | OXYGEN SATURATION: 98 % | DIASTOLIC BLOOD PRESSURE: 60 MMHG

## 2019-08-28 PROCEDURE — G0157 HHC PT ASSISTANT EA 15: HCPCS

## 2019-08-30 ENCOUNTER — HOME CARE VISIT (OUTPATIENT)
Dept: HOME HEALTH SERVICES | Facility: HOME HEALTHCARE | Age: 84
End: 2019-08-30
Payer: MEDICARE

## 2019-08-30 ENCOUNTER — NON-PROVIDER VISIT (OUTPATIENT)
Dept: OCCUPATIONAL MEDICINE | Facility: CLINIC | Age: 84
End: 2019-08-30

## 2019-08-30 VITALS
TEMPERATURE: 97.7 F | OXYGEN SATURATION: 98 % | HEART RATE: 66 BPM | RESPIRATION RATE: 18 BRPM | SYSTOLIC BLOOD PRESSURE: 138 MMHG | DIASTOLIC BLOOD PRESSURE: 60 MMHG

## 2019-08-30 DIAGNOSIS — Z11.1 PPD SCREENING TEST: ICD-10-CM

## 2019-08-30 PROCEDURE — 86580 TB INTRADERMAL TEST: CPT | Performed by: NURSE PRACTITIONER

## 2019-08-30 PROCEDURE — G0157 HHC PT ASSISTANT EA 15: HCPCS

## 2019-09-02 ENCOUNTER — HOME CARE VISIT (OUTPATIENT)
Dept: HOME HEALTH SERVICES | Facility: HOME HEALTHCARE | Age: 84
End: 2019-09-02
Payer: MEDICARE

## 2019-09-02 ENCOUNTER — NON-PROVIDER VISIT (OUTPATIENT)
Dept: URGENT CARE | Facility: CLINIC | Age: 84
End: 2019-09-02

## 2019-09-02 LAB — TB WHEAL 3D P 5 TU DIAM: NORMAL MM

## 2019-09-03 ENCOUNTER — HOME CARE VISIT (OUTPATIENT)
Dept: HOME HEALTH SERVICES | Facility: HOME HEALTHCARE | Age: 84
End: 2019-09-03
Payer: MEDICARE

## 2019-09-03 VITALS
SYSTOLIC BLOOD PRESSURE: 142 MMHG | DIASTOLIC BLOOD PRESSURE: 60 MMHG | RESPIRATION RATE: 18 BRPM | HEART RATE: 61 BPM | OXYGEN SATURATION: 99 % | TEMPERATURE: 97.1 F

## 2019-09-03 PROCEDURE — G0152 HHCP-SERV OF OT,EA 15 MIN: HCPCS

## 2019-09-03 ASSESSMENT — ACTIVITIES OF DAILY LIVING (ADL)
BATHING_ASSISTANCE: 4
SHOPPING_ASSISTANCE: 6
TRANSPORTATION_ASSISTANCE: 6
GROOMING_COMMENTS: STANDING AT SINK
DRESSING_LB_ASSISTANCE: 1
DRESSING_LB_ASSISTANCE: 0
GROOMING_ASSISTANCE: 0
MEAL_PREP_ASSISTANCE: 6
BATHING_ASSISTANCE: 5
TOILETING_ASSISTANCE: 0
DRESSING_UB_ASSISTANCE: 0
EATING_ASSISTANCE: 0
HOUSEKEEPING_ASSISTANCE: 6
ORAL_CARE_ASSISTANCE: 0
LAUNDRY_ASSISTANCE: 6

## 2019-09-04 ENCOUNTER — HOME CARE VISIT (OUTPATIENT)
Dept: HOME HEALTH SERVICES | Facility: HOME HEALTHCARE | Age: 84
End: 2019-09-04
Payer: MEDICARE

## 2019-09-04 VITALS
OXYGEN SATURATION: 99 % | RESPIRATION RATE: 18 BRPM | TEMPERATURE: 97.4 F | HEART RATE: 63 BPM | DIASTOLIC BLOOD PRESSURE: 58 MMHG | SYSTOLIC BLOOD PRESSURE: 148 MMHG

## 2019-09-04 PROCEDURE — G0151 HHCP-SERV OF PT,EA 15 MIN: HCPCS

## 2019-09-10 ENCOUNTER — HOME CARE VISIT (OUTPATIENT)
Dept: HOME HEALTH SERVICES | Facility: HOME HEALTHCARE | Age: 84
End: 2019-09-10
Payer: MEDICARE

## 2019-09-10 PROCEDURE — G0151 HHCP-SERV OF PT,EA 15 MIN: HCPCS

## 2019-09-11 VITALS
HEART RATE: 73 BPM | OXYGEN SATURATION: 96 % | DIASTOLIC BLOOD PRESSURE: 60 MMHG | RESPIRATION RATE: 16 BRPM | SYSTOLIC BLOOD PRESSURE: 125 MMHG | TEMPERATURE: 97.4 F

## 2019-09-17 ENCOUNTER — HOME CARE VISIT (OUTPATIENT)
Dept: HOME HEALTH SERVICES | Facility: HOME HEALTHCARE | Age: 84
End: 2019-09-17
Payer: MEDICARE

## 2019-09-17 PROCEDURE — G0151 HHCP-SERV OF PT,EA 15 MIN: HCPCS

## 2019-09-18 VITALS
HEART RATE: 68 BPM | DIASTOLIC BLOOD PRESSURE: 65 MMHG | RESPIRATION RATE: 18 BRPM | OXYGEN SATURATION: 98 % | SYSTOLIC BLOOD PRESSURE: 138 MMHG | TEMPERATURE: 97.6 F

## 2019-09-18 SDOH — ECONOMIC STABILITY: HOUSING INSECURITY: HOME SAFETY: PT LIVES IN AN ASSISTED LIVING/GROUP HOME

## 2019-09-18 ASSESSMENT — ACTIVITIES OF DAILY LIVING (ADL)
OASIS_M1830: 02
HOME_HEALTH_OASIS: 01

## 2020-04-11 ENCOUNTER — HOSPITAL ENCOUNTER (OUTPATIENT)
Facility: MEDICAL CENTER | Age: 85
End: 2020-04-11
Attending: PHYSICIAN ASSISTANT
Payer: MEDICARE

## 2020-04-11 LAB
ALBUMIN SERPL BCP-MCNC: 3.9 G/DL (ref 3.2–4.9)
ALBUMIN/GLOB SERPL: 1.4 G/DL
ALP SERPL-CCNC: 68 U/L (ref 30–99)
ALT SERPL-CCNC: 38 U/L (ref 2–50)
ANION GAP SERPL CALC-SCNC: 13 MMOL/L (ref 7–16)
AST SERPL-CCNC: 46 U/L (ref 12–45)
BASOPHILS # BLD AUTO: 0.4 % (ref 0–1.8)
BASOPHILS # BLD: 0.03 K/UL (ref 0–0.12)
BILIRUB SERPL-MCNC: 0.4 MG/DL (ref 0.1–1.5)
BUN SERPL-MCNC: 36 MG/DL (ref 8–22)
CALCIUM SERPL-MCNC: 8.9 MG/DL (ref 8.5–10.5)
CHLORIDE SERPL-SCNC: 96 MMOL/L (ref 96–112)
CHOLEST SERPL-MCNC: 115 MG/DL (ref 100–199)
CO2 SERPL-SCNC: 20 MMOL/L (ref 20–33)
CREAT SERPL-MCNC: 1.45 MG/DL (ref 0.5–1.4)
EOSINOPHIL # BLD AUTO: 0.04 K/UL (ref 0–0.51)
EOSINOPHIL NFR BLD: 0.5 % (ref 0–6.9)
ERYTHROCYTE [DISTWIDTH] IN BLOOD BY AUTOMATED COUNT: 46.5 FL (ref 35.9–50)
GGT SERPL-CCNC: 26 U/L (ref 7–34)
GLOBULIN SER CALC-MCNC: 2.8 G/DL (ref 1.9–3.5)
GLUCOSE SERPL-MCNC: 94 MG/DL (ref 65–99)
HCT VFR BLD AUTO: 40.1 % (ref 37–47)
HDLC SERPL-MCNC: 55 MG/DL
HGB BLD-MCNC: 13.2 G/DL (ref 12–16)
IMM GRANULOCYTES # BLD AUTO: 0.03 K/UL (ref 0–0.11)
IMM GRANULOCYTES NFR BLD AUTO: 0.4 % (ref 0–0.9)
LDLC SERPL CALC-MCNC: 37 MG/DL
LYMPHOCYTES # BLD AUTO: 1.57 K/UL (ref 1–4.8)
LYMPHOCYTES NFR BLD: 20 % (ref 22–41)
MCH RBC QN AUTO: 32.9 PG (ref 27–33)
MCHC RBC AUTO-ENTMCNC: 32.9 G/DL (ref 33.6–35)
MCV RBC AUTO: 100 FL (ref 81.4–97.8)
MONOCYTES # BLD AUTO: 0.6 K/UL (ref 0–0.85)
MONOCYTES NFR BLD AUTO: 7.6 % (ref 0–13.4)
NEUTROPHILS # BLD AUTO: 5.58 K/UL (ref 2–7.15)
NEUTROPHILS NFR BLD: 71.1 % (ref 44–72)
NRBC # BLD AUTO: 0 K/UL
NRBC BLD-RTO: 0 /100 WBC
PLATELET # BLD AUTO: 192 K/UL (ref 164–446)
PMV BLD AUTO: 9.5 FL (ref 9–12.9)
POTASSIUM SERPL-SCNC: 4.6 MMOL/L (ref 3.6–5.5)
PROT SERPL-MCNC: 6.7 G/DL (ref 6–8.2)
RBC # BLD AUTO: 4.01 M/UL (ref 4.2–5.4)
SODIUM SERPL-SCNC: 129 MMOL/L (ref 135–145)
TRIGL SERPL-MCNC: 114 MG/DL (ref 0–149)
WBC # BLD AUTO: 7.9 K/UL (ref 4.8–10.8)

## 2020-04-11 PROCEDURE — 80061 LIPID PANEL: CPT

## 2020-04-11 PROCEDURE — 82977 ASSAY OF GGT: CPT

## 2020-04-11 PROCEDURE — 85025 COMPLETE CBC W/AUTO DIFF WBC: CPT

## 2020-04-11 PROCEDURE — 80053 COMPREHEN METABOLIC PANEL: CPT

## 2020-07-13 NOTE — ED NOTES
----- Message from Atif Murdock Received report from OTIS Estrada.

## 2021-01-11 DIAGNOSIS — Z23 NEED FOR VACCINATION: ICD-10-CM

## 2021-03-29 ENCOUNTER — HOSPITAL ENCOUNTER (OUTPATIENT)
Facility: MEDICAL CENTER | Age: 86
End: 2021-03-29
Attending: PHYSICIAN ASSISTANT
Payer: MEDICARE

## 2021-03-29 LAB
ALBUMIN SERPL BCP-MCNC: 4 G/DL (ref 3.2–4.9)
ALBUMIN/GLOB SERPL: 1 G/DL
ALP SERPL-CCNC: 110 U/L (ref 30–99)
ALT SERPL-CCNC: 38 U/L (ref 2–50)
ANION GAP SERPL CALC-SCNC: 11 MMOL/L (ref 7–16)
AST SERPL-CCNC: 46 U/L (ref 12–45)
BASOPHILS # BLD AUTO: 0.1 % (ref 0–1.8)
BASOPHILS # BLD: 0.01 K/UL (ref 0–0.12)
BILIRUB SERPL-MCNC: 0.4 MG/DL (ref 0.1–1.5)
BUN SERPL-MCNC: 36 MG/DL (ref 8–22)
CALCIUM SERPL-MCNC: 9.4 MG/DL (ref 8.5–10.5)
CHLORIDE SERPL-SCNC: 108 MMOL/L (ref 96–112)
CHOLEST SERPL-MCNC: 108 MG/DL (ref 100–199)
CO2 SERPL-SCNC: 20 MMOL/L (ref 20–33)
CREAT SERPL-MCNC: 1.43 MG/DL (ref 0.5–1.4)
EOSINOPHIL # BLD AUTO: 0.06 K/UL (ref 0–0.51)
EOSINOPHIL NFR BLD: 0.8 % (ref 0–6.9)
ERYTHROCYTE [DISTWIDTH] IN BLOOD BY AUTOMATED COUNT: 50.3 FL (ref 35.9–50)
GLOBULIN SER CALC-MCNC: 4.1 G/DL (ref 1.9–3.5)
GLUCOSE SERPL-MCNC: 80 MG/DL (ref 65–99)
HCT VFR BLD AUTO: 39.6 % (ref 37–47)
HDLC SERPL-MCNC: 39 MG/DL
HGB BLD-MCNC: 12.7 G/DL (ref 12–16)
IMM GRANULOCYTES # BLD AUTO: 0.03 K/UL (ref 0–0.11)
IMM GRANULOCYTES NFR BLD AUTO: 0.4 % (ref 0–0.9)
LDLC SERPL CALC-MCNC: 53 MG/DL
LYMPHOCYTES # BLD AUTO: 1.63 K/UL (ref 1–4.8)
LYMPHOCYTES NFR BLD: 21.5 % (ref 22–41)
MCH RBC QN AUTO: 32.8 PG (ref 27–33)
MCHC RBC AUTO-ENTMCNC: 32.1 G/DL (ref 33.6–35)
MCV RBC AUTO: 102.3 FL (ref 81.4–97.8)
MONOCYTES # BLD AUTO: 0.55 K/UL (ref 0–0.85)
MONOCYTES NFR BLD AUTO: 7.3 % (ref 0–13.4)
NEUTROPHILS # BLD AUTO: 5.29 K/UL (ref 2–7.15)
NEUTROPHILS NFR BLD: 69.9 % (ref 44–72)
NRBC # BLD AUTO: 0 K/UL
NRBC BLD-RTO: 0 /100 WBC
PLATELET # BLD AUTO: 214 K/UL (ref 164–446)
PMV BLD AUTO: 10.3 FL (ref 9–12.9)
POTASSIUM SERPL-SCNC: 5.7 MMOL/L (ref 3.6–5.5)
PROT SERPL-MCNC: 8.1 G/DL (ref 6–8.2)
RBC # BLD AUTO: 3.87 M/UL (ref 4.2–5.4)
SODIUM SERPL-SCNC: 139 MMOL/L (ref 135–145)
T4 FREE SERPL-MCNC: 1.47 NG/DL (ref 0.93–1.7)
TRIGL SERPL-MCNC: 80 MG/DL (ref 0–149)
TSH SERPL DL<=0.005 MIU/L-ACNC: 2.83 UIU/ML (ref 0.38–5.33)
WBC # BLD AUTO: 7.6 K/UL (ref 4.8–10.8)

## 2021-03-29 PROCEDURE — 80061 LIPID PANEL: CPT

## 2021-03-29 PROCEDURE — 84439 ASSAY OF FREE THYROXINE: CPT

## 2021-03-29 PROCEDURE — 85025 COMPLETE CBC W/AUTO DIFF WBC: CPT

## 2021-03-29 PROCEDURE — 84443 ASSAY THYROID STIM HORMONE: CPT

## 2021-03-29 PROCEDURE — 80053 COMPREHEN METABOLIC PANEL: CPT

## 2021-03-30 ENCOUNTER — HOSPITAL ENCOUNTER (OUTPATIENT)
Facility: MEDICAL CENTER | Age: 86
End: 2021-03-30
Attending: PHYSICIAN ASSISTANT
Payer: MEDICARE

## 2021-03-30 LAB
APPEARANCE UR: CLEAR
BACTERIA #/AREA URNS HPF: ABNORMAL /HPF
BILIRUB UR QL STRIP.AUTO: NEGATIVE
COLOR UR: YELLOW
EPI CELLS #/AREA URNS HPF: NEGATIVE /HPF
GLUCOSE UR STRIP.AUTO-MCNC: NEGATIVE MG/DL
HYALINE CASTS #/AREA URNS LPF: ABNORMAL /LPF
KETONES UR STRIP.AUTO-MCNC: NEGATIVE MG/DL
LEUKOCYTE ESTERASE UR QL STRIP.AUTO: ABNORMAL
MICRO URNS: ABNORMAL
NITRITE UR QL STRIP.AUTO: NEGATIVE
PH UR STRIP.AUTO: 5 [PH] (ref 5–8)
PROT UR QL STRIP: NEGATIVE MG/DL
RBC # URNS HPF: ABNORMAL /HPF
RBC UR QL AUTO: NEGATIVE
SP GR UR STRIP.AUTO: 1.02
UROBILINOGEN UR STRIP.AUTO-MCNC: 0.2 MG/DL
WBC #/AREA URNS HPF: ABNORMAL /HPF

## 2021-03-30 PROCEDURE — 87186 SC STD MICRODIL/AGAR DIL: CPT

## 2021-03-30 PROCEDURE — 81001 URINALYSIS AUTO W/SCOPE: CPT

## 2021-03-30 PROCEDURE — 87077 CULTURE AEROBIC IDENTIFY: CPT

## 2021-03-30 PROCEDURE — 87086 URINE CULTURE/COLONY COUNT: CPT

## 2021-06-17 RX ORDER — AMLODIPINE BESYLATE 5 MG/1
TABLET ORAL
Qty: 30 TABLET | Refills: 0 | Status: SHIPPED | OUTPATIENT
Start: 2021-06-17

## 2023-04-13 NOTE — LETTER
Request for Medical Records    Patient Name: Felipa Loyd    : 1932      Dear Doctor: Adalberto Burton,    The above named patient receives primary care at the [facility] by Kirill Friedman M.D..  The patient informs us that you are her eye care Provider.    Please fax a copy of the most recent eye exam to [phone] or answer the  questions below and fax this sheet back to us at the above number.  Attached is a signed Release of Information.      Date of last eye exam: _____________    Retinal eye exam summary:        Please select the choice(s) that apply.    ____ No diabetic retinopathy    ____    Diabetic retinopathy present      Printed Name and Credentials: __________________________________    Signature of Eye Care Provider: _________________________________    We appreciate your assistance and collaboration in providing efficient patient care!    Kindest Regards,    [facility] [address]  [address] [facility]  [place] [address] [phone]   [email or web address]